# Patient Record
Sex: FEMALE | Race: BLACK OR AFRICAN AMERICAN | NOT HISPANIC OR LATINO | Employment: OTHER | URBAN - METROPOLITAN AREA
[De-identification: names, ages, dates, MRNs, and addresses within clinical notes are randomized per-mention and may not be internally consistent; named-entity substitution may affect disease eponyms.]

---

## 2017-01-30 ENCOUNTER — PRE VISIT (OUTPATIENT)
Dept: CARDIOLOGY | Facility: CLINIC | Age: 82
End: 2017-01-30

## 2017-02-08 ENCOUNTER — OFFICE VISIT (OUTPATIENT)
Dept: CARDIOLOGY | Facility: CLINIC | Age: 82
End: 2017-02-08
Attending: INTERNAL MEDICINE
Payer: MEDICARE

## 2017-02-08 VITALS
SYSTOLIC BLOOD PRESSURE: 162 MMHG | HEART RATE: 64 BPM | DIASTOLIC BLOOD PRESSURE: 86 MMHG | HEIGHT: 64 IN | OXYGEN SATURATION: 97 % | WEIGHT: 251 LBS | BODY MASS INDEX: 42.85 KG/M2

## 2017-02-08 DIAGNOSIS — I48.92 ATRIAL FLUTTER, UNSPECIFIED TYPE (H): Primary | ICD-10-CM

## 2017-02-08 DIAGNOSIS — I47.10 SVT (SUPRAVENTRICULAR TACHYCARDIA) (H): ICD-10-CM

## 2017-02-08 PROCEDURE — 99204 OFFICE O/P NEW MOD 45 MIN: CPT | Performed by: INTERNAL MEDICINE

## 2017-02-08 RX ORDER — CARVEDILOL 25 MG/1
12.5 TABLET ORAL 2 TIMES DAILY WITH MEALS
Qty: 60 TABLET | Refills: 3 | COMMUNITY
Start: 2017-02-08

## 2017-02-08 RX ORDER — AMIODARONE HYDROCHLORIDE 200 MG/1
TABLET ORAL
Qty: 30 TABLET | Refills: 1 | Status: ON HOLD | COMMUNITY
Start: 2017-02-08 | End: 2017-03-20

## 2017-02-08 NOTE — LETTER
2/8/2017    [facility]  [address] [facility] [address]    RE: Anastasiya Mota       Dear Colleague,      It was my pleasure seeing Ms. Anastasiya Mota for evaluation of SVT and atrial flutter.  She is a pleasant 87-year-old woman who had a prolonged hospitalization at the [facility] in 12/2015 due to a lower GI bleed which was complicated by a non-STEMI, acute renal insufficiency, ICU delirium, strokes and episodes of tachycardia.  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        With this cardiac background, the patient was admitted at [facility] on [place] Day 2016.  She lives in assisted living and told the staff she was not feeling well.  They detected tachycardia and she was sent to the emergency room.  I do have a 12-lead ECG that shows a regular tachycardia with RBBB morphology.  The patient has a baseline RBBB.  Apparently this terminated with adenosine in the emergency room but recurred soon thereafter, so she was placed on a diltiazem drip and was admitted.  While hospitalized at [facility], she was seen in consultation initially by Dr. Gupta and later by Dr. Mendez.  She was started on amiodarone 200 mg b.i.d. followed by 200 mg daily.  The patient was discharged on amiodarone as well as diltiazem 120 mg daily.  In addition, carvedilol 6.25 mg b.i.d. was added for hypertension.  Dr. Mendez requested that she follow up with EP as outpatient.        In coming in today, Ms. Mota was alert and in no apparent distress.  She was accompanied by her daughter.  She is a little hard of hearing.  Her daughter provided most of the history.  Apparently there have been spikes in her blood pressure at [facility].  She has not had documented recurrent tachycardia.  She has not had chest pain, syncope or near-syncope.      PHYSICAL EXAMINATION:   VITAL SIGNS:  Blood pressure  162/86, pulse 64 and regular, weight 113.8 kg.  Height 163 cm.  BMI 43.   GENERAL:  She is a pleasant, significantly overweight woman in no apparent distress.   NECK:  Supple without bruits.   LUNGS:  Clear except for the right base.   CARDIOVASCULAR:  Regular rhythm, no apparent gallop, murmur, or rub.   ABDOMEN:  Quite obese, soft, nontender.   EXTREMITIES:  Trace edema.   SKIN:  No rash.   BACK:  No CVA tenderness.   NEUROLOGIC:  Alert and oriented x3.      DIAGNOSTIC STUDIES:    Recent laboratory tests show creatinine 1.26, potassium 3.9.  TSH 2.05.  Hematocrit 41%.      Her baseline 12-lead ECG shows sinus rhythm with a right bundle-branch block.  Her tachycardia on ECG was described in the HPI.      She had an echocardiogram on 12/26/2016 showing normal LV function with EF of 60%-65%, moderate LVH, normal RV size and function.  There was mild TR with estimated RVSP of 38 mmHg plus right atrial pressure signifying pulmonary hypertension.     Outpatient Encounter Prescriptions as of 2/8/2017   Medication Sig Dispense Refill     carvedilol (COREG) 12.5 MG tablet Take 1 tablet (12.5 mg) by mouth 2 times daily (with meals) 60 tablet 3     amiodarone (PACERONE/CODARONE) 200 MG tablet 1 tab (200 mg) daily except on SAT + SUN (take 5 days/week) 30 tablet 1     diltiazem 120 MG 24 hr capsule Take 1 capsule (120 mg) by mouth daily 30 capsule 0     warfarin (COUMADIN) 1 MG tablet Take 7.5 mg the evening of 12/29/16 and 12/30/16, then reduce your dose back to 5 mg daily.  Continue lovenox injections until your INR is between 2.0 and 3.0.  Please have an INR checked in two days. 30 tablet 0     ferrous sulfate (IRON) 325 (65 FE) MG tablet Take 325 mg by mouth daily (with breakfast)        ANASTROZOLE PO Take 1 mg by mouth daily        ASPIRIN PO Take 81 mg by mouth daily        HYDROCHLOROTHIAZIDE PO Take 25 mg by mouth daily       LANSOPRAZOLE PO Take 30 mg by mouth every morning (before breakfast)        ONDANSETRON PO  Take 4 mg by mouth daily        polyethylene glycol (MIRALAX) powder Take 17 g by mouth daily       senna-docusate (SENEXON-S) 8.6-50 MG per tablet Take 2 tablets by mouth 2 times daily       atorvastatin (LIPITOR) 40 MG tablet Take 40 mg by mouth every evening        ACETAMINOPHEN PO Take 1,000 mg by mouth 3 times daily as needed        bisacodyl (DULCOLAX) 10 MG Suppository Place 10 mg rectally daily as needed for constipation       MELATONIN PO Take 3 mg by mouth nightly as needed        ondansetron (ZOFRAN ODT) 4 MG ODT tab Take 4 mg by mouth every 6 hours as needed for nausea       [DISCONTINUED] enoxaparin (LOVENOX) 100 MG/ML injection Inject 1 mL (100 mg) Subcutaneous every 12 hours (to be administered by her daughter who is a nurse) 12 Syringe 0     [DISCONTINUED] carvedilol (COREG) 6.25 MG tablet Take 1 tablet (6.25 mg) by mouth 2 times daily (with meals) 60 tablet 0     [DISCONTINUED] amiodarone (PACERONE/CODARONE) 200 MG tablet Take 1 tablet (200 mg) by mouth daily (start this dose on 1/2/17 after completing initial loading dose). 30 tablet 1     No facility-administered encounter medications on file as of 2/8/2017.       IMPRESSION:   1.  Tachycardia.  Ms. Mota had adenosine-responsive SVT during her recent admission at [facility], likely AVNRT.  We have not recently seen atrial flutter, but this was mentioned during her [place] hospitalization in 12/2015 when the patient was very ill with multiple issues.          From a cardiac standpoint, Ms. Mota is currently on diltiazem, carvedilol and amiodarone.  I discussed with the patient and her daughter that amiodarone is effective for treating many types of arrhythmia, but is a potentially toxic medication when taken long-term.           We discussed the following options:   a.  Continue amiodarone, but decrease the dose to minimize the risk of end-organ toxicity.   b.  Catheter ablation of supraventricular tachycardia.  I would not target the atrial  flutter unless easily inducible in the EP Lab.  This arrhythmia has not been seen recently and may have been triggered by acute illness back then.      The risks and benefits of EP study and catheter ablation of SVT were discussed in detail with Ms. Mota and her daughter.  Despite her 87 years of age, ablation appears this is the best long-term option which would enable us to stop amiodarone.    Ms. Mota's daughter, Cyndy, had several good questions and all were answered.  She indicated that they will give these options some thought and call back with a decision.      If they decide to proceed with EP study and ablation, I would stop amiodarone at least 2 weeks before the procedure and probably hold diltiazem and carvedilol for 48 hours before the procedure to maximize the likelihood of SVT inducibility and successful ablation.  She would need an alternative antihypertensive medication during that period.  If they decide to stay on amiodarone, she would need thyroid, liver function tests in about 3 months and baseline PFTs in the near future.     2.  Hypertension.  Her blood pressure was elevated today.  I asked her to increase the carvedilol.      RECOMMENDATIONS:   1.  Increase carvedilol to 12.5 mg b.i.d.   2.  Decrease amiodarone to 200 mg 5 days per week (skip on Saturdays and Sundays).   3.  Consider catheter ablation of SVT.   4.  A follow-up appointment has been requested in 6 months.              Thank you for the opportunity to participate in her care.     Sincerely,    Layla Peterson MD     [facility]

## 2017-02-08 NOTE — PROGRESS NOTES
HPI and Plan:   See dictation    Orders Placed This Encounter   Procedures     Follow-Up with Cardiac Advanced Practice Provider       Orders Placed This Encounter   Medications     carvedilol (COREG) 12.5 MG tablet     Sig: Take 1 tablet (12.5 mg) by mouth 2 times daily (with meals)     Dispense:  60 tablet     Refill:  3     amiodarone (PACERONE/CODARONE) 200 MG tablet     Si tab (200 mg) daily except on SAT + SUN (take 5 days/week)     Dispense:  30 tablet     Refill:  1       Medications Discontinued During This Encounter   Medication Reason     enoxaparin (LOVENOX) 100 MG/ML injection Therapy completed     carvedilol (COREG) 6.25 MG tablet Reorder     amiodarone (PACERONE/CODARONE) 200 MG tablet Reorder         Encounter Diagnoses   Name Primary?     SVT (supraventricular tachycardia) (H)      Atrial flutter, unspecified type (H) Yes       CURRENT MEDICATIONS:  Current Outpatient Prescriptions   Medication Sig Dispense Refill     carvedilol (COREG) 12.5 MG tablet Take 1 tablet (12.5 mg) by mouth 2 times daily (with meals) 60 tablet 3     amiodarone (PACERONE/CODARONE) 200 MG tablet 1 tab (200 mg) daily except on SAT + SUN (take 5 days/week) 30 tablet 1     diltiazem 120 MG 24 hr capsule Take 1 capsule (120 mg) by mouth daily 30 capsule 0     warfarin (COUMADIN) 1 MG tablet Take 7.5 mg the evening of 16 and 16, then reduce your dose back to 5 mg daily.  Continue lovenox injections until your INR is between 2.0 and 3.0.  Please have an INR checked in two days. 30 tablet 0     ferrous sulfate (IRON) 325 (65 FE) MG tablet Take 325 mg by mouth daily (with breakfast)        ANASTROZOLE PO Take 1 mg by mouth daily        ASPIRIN PO Take 81 mg by mouth daily        HYDROCHLOROTHIAZIDE PO Take 25 mg by mouth daily       LANSOPRAZOLE PO Take 30 mg by mouth every morning (before breakfast)        ONDANSETRON PO Take 4 mg by mouth daily        polyethylene glycol (MIRALAX) powder Take 17 g by mouth daily        senna-docusate (SENEXON-S) 8.6-50 MG per tablet Take 2 tablets by mouth 2 times daily       atorvastatin (LIPITOR) 40 MG tablet Take 40 mg by mouth every evening        ACETAMINOPHEN PO Take 1,000 mg by mouth 3 times daily as needed        bisacodyl (DULCOLAX) 10 MG Suppository Place 10 mg rectally daily as needed for constipation       MELATONIN PO Take 3 mg by mouth nightly as needed        ondansetron (ZOFRAN ODT) 4 MG ODT tab Take 4 mg by mouth every 6 hours as needed for nausea       [DISCONTINUED] carvedilol (COREG) 6.25 MG tablet Take 1 tablet (6.25 mg) by mouth 2 times daily (with meals) 60 tablet 0     [DISCONTINUED] amiodarone (PACERONE/CODARONE) 200 MG tablet Take 1 tablet (200 mg) by mouth daily (start this dose on 1/2/17 after completing initial loading dose). 30 tablet 1       ALLERGIES   No Known Allergies    PAST MEDICAL HISTORY:  Past Medical History   Diagnosis Date     Hypertension      SVT (supraventricular tachycardia) (H)      Cancer (H)      Breast cancer (H)      Acid reflux      Nausea      Cerebral infarction (H)      Elevated cholesterol      Constipation      Atrial fibrillation (H)      Paroxysmal supraventricular tachycardia (H)        PAST SURGICAL HISTORY:  Past Surgical History   Procedure Laterality Date     Breast surgery         FAMILY HISTORY:  Family History   Problem Relation Age of Onset     Unknown/Adopted No family hx of        SOCIAL HISTORY:  Social History     Social History     Marital Status:      Spouse Name: N/A     Number of Children: N/A     Years of Education: N/A     Social History Main Topics     Smoking status: Never Smoker      Smokeless tobacco: None     Alcohol Use: No     Drug Use: None     Sexual Activity: Not Asked     Other Topics Concern     Caffeine Concern No     Special Diet No     regular diet      Exercise No     Social History Narrative       [id number]

## 2017-02-09 NOTE — PROGRESS NOTES
HISTORY OF PRESENT ILLNESS:    It was my pleasure seeing Ms. Anastasiya Mota for evaluation of SVT and atrial flutter.  She is a pleasant 87-year-old woman who had a prolonged hospitalization at the [facility] in 12/2015 due to a lower GI bleed which was complicated by a non-STEMI, acute renal insufficiency, ICU delirium, strokes and episodes of tachycardia.  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        With this cardiac background, the patient was admitted at [facility] on [place] Day 2016.  She lives in assisted living and told the staff she was not feeling well.  They detected tachycardia and she was sent to the emergency room.  I do have a 12-lead ECG that shows a regular tachycardia with RBBB morphology.  The patient has a baseline RBBB.  Apparently this terminated with adenosine in the emergency room but recurred soon thereafter, so she was placed on a diltiazem drip and was admitted.  While hospitalized at [facility], she was seen in consultation initially by Dr. Gupta and later by Dr. Mendez.  She was started on amiodarone 200 mg b.i.d. followed by 200 mg daily.  The patient was discharged on amiodarone as well as diltiazem 120 mg daily.  In addition, carvedilol 6.25 mg b.i.d. was added for hypertension.  Dr. Mendez requested that she follow up with EP as outpatient.        In coming in today, Ms. Mota was alert and in no apparent distress.  She was accompanied by her daughter.  She is a little hard of hearing.  Her daughter provided most of the history.  Apparently there have been spikes in her blood pressure at [facility] living.  She has not had documented recurrent tachycardia.  She has not had chest pain, syncope or near-syncope.      PHYSICAL EXAMINATION:   VITAL SIGNS:  Blood pressure 162/86, pulse 64 and regular, weight 113.8 kg.  Height 163 cm.  BMI 43.   GENERAL:  She is a pleasant,  significantly overweight woman in no apparent distress.   NECK:  Supple without bruits.   LUNGS:  Clear except for the right base.   CARDIOVASCULAR:  Regular rhythm, no apparent gallop, murmur, or rub.   ABDOMEN:  Quite obese, soft, nontender.   EXTREMITIES:  Trace edema.   SKIN:  No rash.   BACK:  No CVA tenderness.   NEUROLOGIC:  Alert and oriented x3.      DIAGNOSTIC STUDIES:    Recent laboratory tests show creatinine 1.26, potassium 3.9.  TSH 2.05.  Hematocrit 41%.      Her baseline 12-lead ECG shows sinus rhythm with a right bundle-branch block.  Her tachycardia on ECG was described in the HPI.      She had an echocardiogram on 12/26/2016 showing normal LV function with EF of 60%-65%, moderate LVH, normal RV size and function.  There was mild TR with estimated RVSP of 38 mmHg plus right atrial pressure signifying pulmonary hypertension.      IMPRESSION:   1.  Tachycardia.  Ms. Mota had adenosine-responsive SVT during her recent admission at [facility], likely AVNRT.  We have not recently seen atrial flutter, but this was mentioned during her [place] hospitalization in 12/2015 when the patient was very ill with multiple issues.          From a cardiac standpoint, Ms. Mota is currently on diltiazem, carvedilol and amiodarone.  I discussed with the patient and her daughter that amiodarone is effective for treating many types of arrhythmia, but is a potentially toxic medication when taken long-term.           We discussed the following options:   a.  Continue amiodarone, but decrease the dose to minimize the risk of end-organ toxicity.   b.  Catheter ablation of supraventricular tachycardia.  I would not target the atrial flutter unless easily inducible in the EP Lab.  This arrhythmia has not been seen recently and may have been triggered by acute illness back then.      The risks and benefits of EP study and catheter ablation of SVT were discussed in detail with Ms. Mota and her daughter.  Despite her 87  years of age, ablation appears this is the best long-term option which would enable us to stop amiodarone.    Ms. Lockett's daughter, Cyndy, had several good questions and all were answered.  She indicated that they will give these options some thought and call back with a decision.      If they decide to proceed with EP study and ablation, I would stop amiodarone at least 2 weeks before the procedure and probably hold diltiazem and carvedilol for 48 hours before the procedure to maximize the likelihood of SVT inducibility and successful ablation.  She would need an alternative antihypertensive medication during that period.  If they decide to stay on amiodarone, she would need thyroid, liver function tests in about 3 months and baseline PFTs in the near future.     2.  Hypertension.  Her blood pressure was elevated today.  I asked her to increase the carvedilol.      RECOMMENDATIONS:   1.  Increase carvedilol to 12.5 mg b.i.d.   2.  Decrease amiodarone to 200 mg 5 days per week (skip on  and Sundays).   3.  Consider catheter ablation of SVT.   4.  A follow-up appointment has been requested in 6 months.      Thank you for the opportunity to participate in her care.         RICKY DUBOIS MD, [facility]             D: 2017 12:01   T: 2017 18:00   MT: KARLA      Name:     MG LOCKETT   MRN:      [id number]        Account:      [id number]   :      [id number]           Service Date: 2017      Document: [id number]

## 2017-02-12 ENCOUNTER — DOCUMENTATION ONLY (OUTPATIENT)
Dept: CARDIOLOGY | Facility: CLINIC | Age: 82
End: 2017-02-12

## 2017-02-12 DIAGNOSIS — I47.10 SVT (SUPRAVENTRICULAR TACHYCARDIA) (H): Primary | ICD-10-CM

## 2017-02-12 NOTE — PROGRESS NOTES
Please read my clinic note and call her daughter Cyndy regarding continuation of amiodarone vs catheter ablation of SVT.    If they want to continue amiodarone, she nneds baseline PFTs and TSH, LFTs in 3 months.  Thx, DI

## 2017-02-15 NOTE — PROGRESS NOTES
Attempted to call pt daughter and male voice stated that this writer had the wrong number. Attempted again and phone went to voice mail and message was left for Cyndy to call back. Sherlyn

## 2017-03-01 NOTE — PROGRESS NOTES
Sharron Ryan NP called today from [facility] in [place] called and needing help with pt BP (150-200 systolic) and wondering if Amiodarone can be stopped and Carvedilol increased. Sharron stated that staff at pt [facility] in [place] has been giving PRN clonidine each AM, which has been bringing pt BP down to the 130's systolic.  Discussed with Sharron, that this writer has been attempting to get in contact with pt daughter Cyndy, but have received no call back. Sharron stated that number that was called was Cyndy's . New phone number given, but at this time Sharron has a call out to Cyndy and have asked that Sharron have Cyndy call this writer to discuss plan going forward. Will then contact Sharron at [phone] to make aware. Cyndy's cell number has been added to demographics. [email or web address]

## 2017-03-03 NOTE — PROGRESS NOTES
Give amlodipine 10 mg daily for the days that dilt and carvedilol are on hold (to prevent significant BP spikes).   Thx, DI

## 2017-03-03 NOTE — PROGRESS NOTES
Pt daughter Cyndy called and states that she spoke to pt last evening and pt would like to have SVT ablation done. Reviewed Dr Peterson OV note on 2/8, it was documented: I would stop amiodarone at least 2 weeks before the procedure and probably hold diltiazem and carvedilol for 48 hours before the procedure to maximize the likelihood of SVT inducibility and successful ablation. Reviewed different dates with Cyndy, who stated that she had the day off on 3/20 and that day would work good. Have a hold on the morning of 3/20. Pt would need to stop Amiodarone this weekend and then hold Diltiazem and Carvedilol starting 3/18. Cyndy is aware of this and stated that this writer can contact Sharron Ryan NP at  Assisted Living and make her aware. Will discuss with Dr Peterson first d/t pt BP and need for med changes with Diltiazem and Carvedilol hold. JNelsonSHARAN

## 2017-03-06 ENCOUNTER — TRANSFERRED RECORDS (OUTPATIENT)
Dept: CARDIOLOGY | Facility: CLINIC | Age: 82
End: 2017-03-06

## 2017-03-06 LAB
ANION GAP SERPL CALCULATED.3IONS-SCNC: 7 MMOL/L
BUN SERPL-MCNC: 19 MG/DL
CALCIUM SERPL-MCNC: 8.8 MG/DL
CHLORIDE SERPLBLD-SCNC: 106 MMOL/L
CO2 SERPL-SCNC: 30 MMOL/L
CREAT SERPL-MCNC: 1.32 MG/DL
GFR SERPL CREATININE-BSD FRML MDRD: 38 ML/MIN/1.73M2
GLUCOSE SERPL-MCNC: 100 MG/DL (ref 70–99)
POTASSIUM SERPL-SCNC: 4 MMOL/L
SODIUM SERPL-SCNC: 143 MMOL/L

## 2017-03-08 DIAGNOSIS — I47.10 SVT (SUPRAVENTRICULAR TACHYCARDIA) (H): Primary | ICD-10-CM

## 2017-03-17 DIAGNOSIS — I47.10 SVT (SUPRAVENTRICULAR TACHYCARDIA) (H): Primary | ICD-10-CM

## 2017-03-17 RX ORDER — LIDOCAINE 40 MG/G
CREAM TOPICAL
Status: CANCELLED | OUTPATIENT
Start: 2017-03-17

## 2017-03-17 RX ORDER — SODIUM CHLORIDE 450 MG/100ML
INJECTION, SOLUTION INTRAVENOUS CONTINUOUS
Status: CANCELLED | OUTPATIENT
Start: 2017-03-17

## 2017-03-17 NOTE — PROGRESS NOTES
Spoke with Dr. Peterson. He would like pt to hold her warfarin on 3/18/2017 and 3/19/2017 for SVT ablation scheduled on 3/20/2017.     Called patient's daughter Cyndy at [phone], reminded her of orders to stop diltiazem and carvedilol, and take amlodipine 10mg daily instead. Cyndy wasn't sure if the assisted living knew about the order for amlodipine yet, so she asked writer to call them at [phone] to make sure. Also told Cyndy about order to hold warfarin on Saturday and Sunday. Cyndy states understanding.     Called Assisted Living at number above, spoke with Renae TSANG, gave her the above orders over the phone, and she said we should fax the orders to [phone] and she will take care of it with the pharmacy. Orders faxed.

## 2017-03-20 ENCOUNTER — HOSPITAL ENCOUNTER (OUTPATIENT)
Facility: CLINIC | Age: 82
Discharge: HOME | End: 2017-03-20
Attending: INTERNAL MEDICINE | Admitting: INTERNAL MEDICINE
Payer: MEDICARE

## 2017-03-20 ENCOUNTER — APPOINTMENT (OUTPATIENT)
Dept: CARDIOLOGY | Facility: CLINIC | Age: 82
End: 2017-03-20
Attending: INTERNAL MEDICINE
Payer: MEDICARE

## 2017-03-20 VITALS
TEMPERATURE: 97 F | BODY MASS INDEX: 43.02 KG/M2 | HEART RATE: 77 BPM | OXYGEN SATURATION: 95 % | RESPIRATION RATE: 18 BRPM | SYSTOLIC BLOOD PRESSURE: 145 MMHG | DIASTOLIC BLOOD PRESSURE: 90 MMHG | WEIGHT: 252 LBS | HEIGHT: 64 IN

## 2017-03-20 DIAGNOSIS — I47.10 SVT (SUPRAVENTRICULAR TACHYCARDIA) (H): ICD-10-CM

## 2017-03-20 LAB
ANION GAP SERPL CALCULATED.3IONS-SCNC: 3 MMOL/L (ref 3–14)
BUN SERPL-MCNC: 25 MG/DL (ref 7–30)
CALCIUM SERPL-MCNC: 8.6 MG/DL (ref 8.5–10.1)
CHLORIDE SERPL-SCNC: 107 MMOL/L (ref 94–109)
CO2 SERPL-SCNC: 33 MMOL/L (ref 20–32)
CREAT SERPL-MCNC: 1.37 MG/DL (ref 0.52–1.04)
ERYTHROCYTE [DISTWIDTH] IN BLOOD BY AUTOMATED COUNT: 15.6 % (ref 10–15)
GFR SERPL CREATININE-BSD FRML MDRD: 36 ML/MIN/1.7M2
GLUCOSE SERPL-MCNC: 96 MG/DL (ref 70–99)
HCT VFR BLD AUTO: 40.1 % (ref 35–47)
HGB BLD-MCNC: 12.5 G/DL (ref 11.7–15.7)
INR BLD: 1.6 (ref 0.86–1.14)
MCH RBC QN AUTO: 27.1 PG (ref 26.5–33)
MCHC RBC AUTO-ENTMCNC: 31.2 G/DL (ref 31.5–36.5)
MCV RBC AUTO: 87 FL (ref 78–100)
PLATELET # BLD AUTO: 273 10E9/L (ref 150–450)
POTASSIUM SERPL-SCNC: 4.2 MMOL/L (ref 3.4–5.3)
RBC # BLD AUTO: 4.62 10E12/L (ref 3.8–5.2)
SODIUM SERPL-SCNC: 143 MMOL/L (ref 133–144)
WBC # BLD AUTO: 6 10E9/L (ref 4–11)

## 2017-03-20 PROCEDURE — 93613 INTRACARDIAC EPHYS 3D MAPG: CPT | Performed by: INTERNAL MEDICINE

## 2017-03-20 PROCEDURE — 99152 MOD SED SAME PHYS/QHP 5/>YRS: CPT

## 2017-03-20 PROCEDURE — 93010 ELECTROCARDIOGRAM REPORT: CPT | Mod: 59 | Performed by: INTERNAL MEDICINE

## 2017-03-20 PROCEDURE — C1732 CATH, EP, DIAG/ABL, 3D/VECT: HCPCS

## 2017-03-20 PROCEDURE — 93613 INTRACARDIAC EPHYS 3D MAPG: CPT

## 2017-03-20 PROCEDURE — 99153 MOD SED SAME PHYS/QHP EA: CPT | Performed by: INTERNAL MEDICINE

## 2017-03-20 PROCEDURE — 27210795 ZZH PAD DEFIB QUICK CR4

## 2017-03-20 PROCEDURE — 02K83ZZ MAP CONDUCTION MECHANISM, PERCUTANEOUS APPROACH: ICD-10-PCS | Performed by: INTERNAL MEDICINE

## 2017-03-20 PROCEDURE — 93005 ELECTROCARDIOGRAM TRACING: CPT

## 2017-03-20 PROCEDURE — 85610 PROTHROMBIN TIME: CPT | Mod: QW | Performed by: INTERNAL MEDICINE

## 2017-03-20 PROCEDURE — 40000235 ZZH STATISTIC TELEMETRY

## 2017-03-20 PROCEDURE — C1730 CATH, EP, 19 OR FEW ELECT: HCPCS

## 2017-03-20 PROCEDURE — 4A023FZ MEASUREMENT OF CARDIAC RHYTHM, PERCUTANEOUS APPROACH: ICD-10-PCS | Performed by: INTERNAL MEDICINE

## 2017-03-20 PROCEDURE — 25000128 H RX IP 250 OP 636: Performed by: INTERNAL MEDICINE

## 2017-03-20 PROCEDURE — 4A0234Z MEASUREMENT OF CARDIAC ELECTRICAL ACTIVITY, PERCUTANEOUS APPROACH: ICD-10-PCS | Performed by: INTERNAL MEDICINE

## 2017-03-20 PROCEDURE — 93653 COMPRE EP EVAL TX SVT: CPT | Performed by: INTERNAL MEDICINE

## 2017-03-20 PROCEDURE — 36415 COLL VENOUS BLD VENIPUNCTURE: CPT | Performed by: INTERNAL MEDICINE

## 2017-03-20 PROCEDURE — 93653 COMPRE EP EVAL TX SVT: CPT

## 2017-03-20 PROCEDURE — 80048 BASIC METABOLIC PNL TOTAL CA: CPT | Performed by: INTERNAL MEDICINE

## 2017-03-20 PROCEDURE — 40000065 ZZH STATISTIC EKG NON-CHARGEABLE

## 2017-03-20 PROCEDURE — 99153 MOD SED SAME PHYS/QHP EA: CPT

## 2017-03-20 PROCEDURE — 27210782 ZZH KIT EP TOTES DISP CR7

## 2017-03-20 PROCEDURE — 27210796 ZZH PAD EXTRNAL REFRENCE CARDIAC MAPPING CR14

## 2017-03-20 PROCEDURE — 27210886 ZZH ACCESSORY CR5

## 2017-03-20 PROCEDURE — 93621 COMP EP EVL L PAC&REC C SINS: CPT

## 2017-03-20 PROCEDURE — 25000125 ZZHC RX 250: Performed by: INTERNAL MEDICINE

## 2017-03-20 PROCEDURE — 40000852 ZZH STATISTIC HEART CATH LAB OR EP LAB

## 2017-03-20 PROCEDURE — 02563ZZ DESTRUCTION OF RIGHT ATRIUM, PERCUTANEOUS APPROACH: ICD-10-PCS | Performed by: INTERNAL MEDICINE

## 2017-03-20 PROCEDURE — 85027 COMPLETE CBC AUTOMATED: CPT | Performed by: INTERNAL MEDICINE

## 2017-03-20 PROCEDURE — 99152 MOD SED SAME PHYS/QHP 5/>YRS: CPT | Performed by: INTERNAL MEDICINE

## 2017-03-20 PROCEDURE — 93623 PRGRMD STIMJ&PACG IV RX NFS: CPT | Mod: 26 | Performed by: INTERNAL MEDICINE

## 2017-03-20 PROCEDURE — 93621 COMP EP EVL L PAC&REC C SINS: CPT | Mod: 26 | Performed by: INTERNAL MEDICINE

## 2017-03-20 PROCEDURE — 27210995 ZZH RX 272: Performed by: INTERNAL MEDICINE

## 2017-03-20 RX ORDER — FUROSEMIDE 10 MG/ML
20-100 INJECTION INTRAMUSCULAR; INTRAVENOUS
Status: DISCONTINUED | OUTPATIENT
Start: 2017-03-20 | End: 2017-03-20 | Stop reason: HOSPADM

## 2017-03-20 RX ORDER — LIDOCAINE HYDROCHLORIDE 10 MG/ML
10-30 INJECTION, SOLUTION EPIDURAL; INFILTRATION; INTRACAUDAL; PERINEURAL
Status: DISCONTINUED | OUTPATIENT
Start: 2017-03-20 | End: 2017-03-20 | Stop reason: HOSPADM

## 2017-03-20 RX ORDER — LORAZEPAM 2 MG/ML
.5-2 INJECTION INTRAMUSCULAR EVERY 10 MIN PRN
Status: DISCONTINUED | OUTPATIENT
Start: 2017-03-20 | End: 2017-03-20 | Stop reason: HOSPADM

## 2017-03-20 RX ORDER — HYDRALAZINE HYDROCHLORIDE 20 MG/ML
20 INJECTION INTRAMUSCULAR; INTRAVENOUS ONCE
Status: COMPLETED | OUTPATIENT
Start: 2017-03-20 | End: 2017-03-20

## 2017-03-20 RX ORDER — NALOXONE HYDROCHLORIDE 0.4 MG/ML
0.4 INJECTION, SOLUTION INTRAMUSCULAR; INTRAVENOUS; SUBCUTANEOUS EVERY 5 MIN PRN
Status: DISCONTINUED | OUTPATIENT
Start: 2017-03-20 | End: 2017-03-20 | Stop reason: HOSPADM

## 2017-03-20 RX ORDER — PROMETHAZINE HYDROCHLORIDE 25 MG/ML
6.25-25 INJECTION, SOLUTION INTRAMUSCULAR; INTRAVENOUS EVERY 4 HOURS PRN
Status: DISCONTINUED | OUTPATIENT
Start: 2017-03-20 | End: 2017-03-20 | Stop reason: HOSPADM

## 2017-03-20 RX ORDER — KETOROLAC TROMETHAMINE 30 MG/ML
15 INJECTION, SOLUTION INTRAMUSCULAR; INTRAVENOUS
Status: DISCONTINUED | OUTPATIENT
Start: 2017-03-20 | End: 2017-03-20 | Stop reason: HOSPADM

## 2017-03-20 RX ORDER — DOBUTAMINE HYDROCHLORIDE 200 MG/100ML
5-40 INJECTION INTRAVENOUS CONTINUOUS PRN
Status: DISCONTINUED | OUTPATIENT
Start: 2017-03-20 | End: 2017-03-20 | Stop reason: HOSPADM

## 2017-03-20 RX ORDER — BUPIVACAINE HYDROCHLORIDE 2.5 MG/ML
10-30 INJECTION, SOLUTION EPIDURAL; INFILTRATION; INTRACAUDAL
Status: DISCONTINUED | OUTPATIENT
Start: 2017-03-20 | End: 2017-03-20 | Stop reason: HOSPADM

## 2017-03-20 RX ORDER — NALOXONE HYDROCHLORIDE 0.4 MG/ML
.1-.4 INJECTION, SOLUTION INTRAMUSCULAR; INTRAVENOUS; SUBCUTANEOUS
Status: DISCONTINUED | OUTPATIENT
Start: 2017-03-20 | End: 2017-03-20 | Stop reason: HOSPADM

## 2017-03-20 RX ORDER — ADENOSINE 3 MG/ML
6-12 INJECTION, SOLUTION INTRAVENOUS EVERY 5 MIN PRN
Status: DISCONTINUED | OUTPATIENT
Start: 2017-03-20 | End: 2017-03-20 | Stop reason: HOSPADM

## 2017-03-20 RX ORDER — PROTAMINE SULFATE 10 MG/ML
1-5 INJECTION, SOLUTION INTRAVENOUS
Status: DISCONTINUED | OUTPATIENT
Start: 2017-03-20 | End: 2017-03-20 | Stop reason: HOSPADM

## 2017-03-20 RX ORDER — MORPHINE SULFATE 2 MG/ML
1-2 INJECTION, SOLUTION INTRAMUSCULAR; INTRAVENOUS EVERY 5 MIN PRN
Status: DISCONTINUED | OUTPATIENT
Start: 2017-03-20 | End: 2017-03-20 | Stop reason: HOSPADM

## 2017-03-20 RX ORDER — LIDOCAINE 40 MG/G
CREAM TOPICAL
Status: DISCONTINUED | OUTPATIENT
Start: 2017-03-20 | End: 2017-03-20 | Stop reason: HOSPADM

## 2017-03-20 RX ORDER — FENTANYL CITRATE 50 UG/ML
25-50 INJECTION, SOLUTION INTRAMUSCULAR; INTRAVENOUS
Status: DISCONTINUED | OUTPATIENT
Start: 2017-03-20 | End: 2017-03-20 | Stop reason: HOSPADM

## 2017-03-20 RX ORDER — ONDANSETRON 2 MG/ML
4 INJECTION INTRAMUSCULAR; INTRAVENOUS EVERY 4 HOURS PRN
Status: DISCONTINUED | OUTPATIENT
Start: 2017-03-20 | End: 2017-03-20 | Stop reason: HOSPADM

## 2017-03-20 RX ORDER — IBUTILIDE FUMARATE 1 MG/10ML
0.01 INJECTION, SOLUTION INTRAVENOUS
Status: DISCONTINUED | OUTPATIENT
Start: 2017-03-20 | End: 2017-03-20 | Stop reason: HOSPADM

## 2017-03-20 RX ORDER — DIPHENHYDRAMINE HYDROCHLORIDE 50 MG/ML
25-50 INJECTION INTRAMUSCULAR; INTRAVENOUS
Status: DISCONTINUED | OUTPATIENT
Start: 2017-03-20 | End: 2017-03-20 | Stop reason: HOSPADM

## 2017-03-20 RX ORDER — SODIUM CHLORIDE 450 MG/100ML
INJECTION, SOLUTION INTRAVENOUS CONTINUOUS
Status: DISCONTINUED | OUTPATIENT
Start: 2017-03-20 | End: 2017-03-20 | Stop reason: HOSPADM

## 2017-03-20 RX ORDER — ACETAMINOPHEN 325 MG/1
650 TABLET ORAL EVERY 4 HOURS PRN
Status: DISCONTINUED | OUTPATIENT
Start: 2017-03-20 | End: 2017-03-20 | Stop reason: HOSPADM

## 2017-03-20 RX ORDER — ONDANSETRON 2 MG/ML
4 INJECTION INTRAMUSCULAR; INTRAVENOUS ONCE
Status: COMPLETED | OUTPATIENT
Start: 2017-03-20 | End: 2017-03-20

## 2017-03-20 RX ORDER — PROTAMINE SULFATE 10 MG/ML
5-40 INJECTION, SOLUTION INTRAVENOUS EVERY 10 MIN PRN
Status: DISCONTINUED | OUTPATIENT
Start: 2017-03-20 | End: 2017-03-20 | Stop reason: HOSPADM

## 2017-03-20 RX ORDER — FLUMAZENIL 0.1 MG/ML
0.2 INJECTION, SOLUTION INTRAVENOUS
Status: DISCONTINUED | OUTPATIENT
Start: 2017-03-20 | End: 2017-03-20 | Stop reason: HOSPADM

## 2017-03-20 RX ORDER — LIDOCAINE HYDROCHLORIDE AND EPINEPHRINE 10; 10 MG/ML; UG/ML
10-30 INJECTION, SOLUTION INFILTRATION; PERINEURAL
Status: DISCONTINUED | OUTPATIENT
Start: 2017-03-20 | End: 2017-03-20 | Stop reason: HOSPADM

## 2017-03-20 RX ORDER — IBUTILIDE FUMARATE 1 MG/10ML
1 INJECTION, SOLUTION INTRAVENOUS
Status: DISCONTINUED | OUTPATIENT
Start: 2017-03-20 | End: 2017-03-20 | Stop reason: HOSPADM

## 2017-03-20 RX ORDER — HEPARIN SODIUM 1000 [USP'U]/ML
1000-10000 INJECTION, SOLUTION INTRAVENOUS; SUBCUTANEOUS EVERY 5 MIN PRN
Status: DISCONTINUED | OUTPATIENT
Start: 2017-03-20 | End: 2017-03-20 | Stop reason: HOSPADM

## 2017-03-20 RX ADMIN — FENTANYL CITRATE 25 MCG: 50 INJECTION, SOLUTION INTRAMUSCULAR; INTRAVENOUS at 08:57

## 2017-03-20 RX ADMIN — FENTANYL CITRATE 25 MCG: 50 INJECTION, SOLUTION INTRAMUSCULAR; INTRAVENOUS at 09:20

## 2017-03-20 RX ADMIN — SODIUM CHLORIDE: 4.5 INJECTION, SOLUTION INTRAVENOUS at 07:32

## 2017-03-20 RX ADMIN — DOBUTAMINE IN DEXTROSE 5 MCG/KG/MIN: 200 INJECTION, SOLUTION INTRAVENOUS at 09:35

## 2017-03-20 RX ADMIN — MIDAZOLAM HYDROCHLORIDE 0.5 MG: 1 INJECTION, SOLUTION INTRAMUSCULAR; INTRAVENOUS at 09:23

## 2017-03-20 RX ADMIN — MIDAZOLAM HYDROCHLORIDE 0.5 MG: 1 INJECTION, SOLUTION INTRAMUSCULAR; INTRAVENOUS at 08:57

## 2017-03-20 RX ADMIN — ONDANSETRON 4 MG: 2 SOLUTION INTRAMUSCULAR; INTRAVENOUS at 11:09

## 2017-03-20 RX ADMIN — HYDRALAZINE HYDROCHLORIDE 20 MG: 20 INJECTION INTRAMUSCULAR; INTRAVENOUS at 08:59

## 2017-03-20 RX ADMIN — FENTANYL CITRATE 25 MCG: 50 INJECTION, SOLUTION INTRAMUSCULAR; INTRAVENOUS at 09:23

## 2017-03-20 RX ADMIN — MIDAZOLAM HYDROCHLORIDE 0.5 MG: 1 INJECTION, SOLUTION INTRAMUSCULAR; INTRAVENOUS at 09:20

## 2017-03-20 NOTE — PROCEDURES
Dictated.  SVT induction (AVNRT).  Successful slow pathway ablation.    No inducible atrial flutter.      No apparent complication.  EBL 20 cc.    Plan:  - stay off amiodarone  - resume other meds  - EP clinic f/up in 4-6 weeks

## 2017-03-20 NOTE — DISCHARGE INSTRUCTIONS
SVT Ablation Discharge Instructions - Femoral     [facility]: [phone] ( 8am-5pm M-F)  SHARAN Upton or Bella RN  [phone] [facility] (7 days a week)    After you go home:      Have an adult stay with you until tomorrow.    You may resume your normal diet.       For 24 hours - due to the sedation you received:    Relax and take it easy.    Do NOT make any important or legal decisions.    Do NOT drive or operate machines at home or at work.    Do NOT drink alcohol.    Care of Groin Puncture Site:      For the first 24 hrs - check the puncture site every 1-2 hours while awake.    For 2 days, when you cough, sneeze, laugh or move your bowels, hold your hand over the puncture site and press firmly.    Remove the bandaid after 24 hours. If there is minor oozing, apply another bandaid and remove it after 12 hours.    It is normal to have a small bruise or pea size lump at the site.    You may shower tomorrow.  Do NOT take a bath, or use a hot tub or pool for at least 3 days. Do NOT scrub the site. Do not use lotion or powder near the puncture site.    Activity:            For 2 days:    No stooping or squatting    Do NOT do any heavy activity such as exercise, lifting, or straining.     No housework, yard work or any activity that make you sweat    Do NOT lift more than 10 pounds    Bleeding:      If you start bleeding from the site in your groin, lie down flat and press firmly on the site for 10 minutes.     Once bleeding stops, lay flat for 2 hours.    Call [facility] as soon as you can.       Call 911 right away if you have heavy bleeding or bleeding that does not stop.      Medicines:      Take your medications, including blood thinners, unless your provider tells you not to.    If you have stopped any other medicines, check with your provider about when to restart them.    If you have pain or shortness of breath, you may take Advil (ibuprofen) or Tylenol (acetaminophen).    Follow Up  Appointments:      An appointment has been set up for you for follow-up care    You will receive a phone call tomorrow morning from Alexsandra TSANG or Bella TSANG.    Call the clinic if:      You have increased pain or a large or growing hard lump around the site.    The site is red, swollen, hot or tender.    Blood or fluid is draining from the site.    You have chills or a fever greater than 101 F (38 C).    Your leg feels numb, cool or changes color.    Increased pain in the chest and/or groin.    Increased shortness of breath    Chest pain not relieved by Tylenol or Advil    New pain in the back or belly that you cannot control with Tylenol.    Recurrent irregular or fast heart rate (AFlutter) lasting over 2 hours.    Any questions or concerns.    Heart rhythms:    You may have some irregular heartbeats. These feel very strong. They may make you feel that the fast heart rhythm is going to start again.  Give it time. The irregular beats should occur less often.       [facility]:    [phone] ( 8am-5pm M-F)  SHARAN Upton or SHARAN Blanco    [phone] [facility] (7 days a week)

## 2017-03-20 NOTE — IP AVS SNAPSHOT
MRN:[id number]                      After Visit Summary   3/20/2017    Anastasiya Mota    MRN: [id number]           Visit Information        Department      3/20/2017  6:32 AM [facility]          Review of your medicines      CONTINUE these medicines which have NOT CHANGED        Dose / Directions    ACETAMINOPHEN PO        Dose:  1000 mg   Take 1,000 mg by mouth 3 times daily as needed   Refills:  0       ANASTROZOLE PO        Dose:  1 mg   Take 1 mg by mouth daily   Refills:  0       atorvastatin 40 MG tablet   Commonly known as:  LIPITOR        Dose:  40 mg   Take 40 mg by mouth every evening   Refills:  0       bisacodyl 10 MG Suppository   Commonly known as:  DULCOLAX        Dose:  10 mg   Place 10 mg rectally daily as needed for constipation   Refills:  0       carvedilol 12.5 MG tablet   Commonly known as:  COREG   Used for:  Atrial flutter, unspecified type (H), SVT (supraventricular tachycardia) (H)        Dose:  12.5 mg   Take 1 tablet (12.5 mg) by mouth 2 times daily (with meals)   Quantity:  60 tablet   Refills:  3       diltiazem 120 MG 24 hr capsule   Used for:  Atrial flutter, unspecified type (H), SVT (supraventricular tachycardia) (H)        Dose:  120 mg   Take 1 capsule (120 mg) by mouth daily   Quantity:  30 capsule   Refills:  0       ferrous sulfate 325 (65 FE) MG tablet   Commonly known as:  IRON        Dose:  325 mg   Take 325 mg by mouth daily (with breakfast)   Refills:  0       HYDROCHLOROTHIAZIDE PO        Dose:  25 mg   Take 25 mg by mouth daily   Refills:  0       LANSOPRAZOLE PO        Dose:  30 mg   Take 30 mg by mouth every morning (before breakfast)   Refills:  0       MELATONIN PO        Dose:  3 mg   Take 3 mg by mouth nightly as needed   Refills:  0       MIRALAX powder   Generic drug:  polyethylene glycol        Dose:  17 g   Take 17 g by mouth daily   Refills:  0       * ONDANSETRON PO        Dose:  4 mg   Take 4 mg by mouth daily    Refills:  0       * ZOFRAN ODT 4 MG ODT tab   Generic drug:  ondansetron        Dose:  4 mg   Take 4 mg by mouth every 6 hours as needed for nausea   Refills:  0       SENEXON-S 8.6-50 MG per tablet   Generic drug:  senna-docusate        Dose:  2 tablet   Take 2 tablets by mouth 2 times daily   Refills:  0       warfarin 1 MG tablet   Commonly known as:  COUMADIN   Used for:  History of stroke, Atrial flutter, unspecified type (H)        Take 7.5 mg the evening of 12/29/16 and 12/30/16, then reduce your dose back to 5 mg daily.  Continue lovenox injections until your INR is between 2.0 and 3.0.  Please have an INR checked in two days.   Quantity:  30 tablet   Refills:  0       * Notice:  This list has 2 medication(s) that are the same as other medications prescribed for you. Read the directions carefully, and ask your doctor or other care provider to review them with you.      STOP taking     amiodarone 200 MG tablet   Commonly known as:  PACERONE/CODARONE           AMLODIPINE BESYLATE PO           ASPIRIN PO                    Protect others around you: Learn how to safely use, store and throw away your medicines at www.disposemymeds.org.         Follow-ups after your visit         Care Instructions        After Care Instructions     Discharge Instructions - Follow up with [facility] Heart Nurse Practitioner          Follow up with EP Nurse Practitioner at [facility] Clinic of patient preference in 5-6 weeks.            Discharge Instructions - Keep incision dry for 72 hours       Keep incision dry for 72 hours (unless Derma Salazar was applied)            Discharge Instructions - No driving for 1 day       No driving for 1 day and limit to necessary driving for 1 week.                  Further instructions from your care team       SVT Ablation Discharge Instructions - Femoral     [facility]: [phone] ( 8am-5pm M-F)  SHARAN Upton or SHARAN Blanco  [phone] [facility] (7 days a week)    After you go  home:      Have an adult stay with you until tomorrow.    You may resume your normal diet.       For 24 hours - due to the sedation you received:    Relax and take it easy.    Do NOT make any important or legal decisions.    Do NOT drive or operate machines at home or at work.    Do NOT drink alcohol.    Care of Groin Puncture Site:      For the first 24 hrs - check the puncture site every 1-2 hours while awake.    For 2 days, when you cough, sneeze, laugh or move your bowels, hold your hand over the puncture site and press firmly.    Remove the bandaid after 24 hours. If there is minor oozing, apply another bandaid and remove it after 12 hours.    It is normal to have a small bruise or pea size lump at the site.    You may shower tomorrow.  Do NOT take a bath, or use a hot tub or pool for at least 3 days. Do NOT scrub the site. Do not use lotion or powder near the puncture site.    Activity:            For 2 days:    No stooping or squatting    Do NOT do any heavy activity such as exercise, lifting, or straining.     No housework, yard work or any activity that make you sweat    Do NOT lift more than 10 pounds    Bleeding:      If you start bleeding from the site in your groin, lie down flat and press firmly on the site for 10 minutes.     Once bleeding stops, lay flat for 2 hours.    Call [facility] as soon as you can.       Call 911 right away if you have heavy bleeding or bleeding that does not stop.      Medicines:      Take your medications, including blood thinners, unless your provider tells you not to.    If you have stopped any other medicines, check with your provider about when to restart them.    If you have pain or shortness of breath, you may take Advil (ibuprofen) or Tylenol (acetaminophen).    Follow Up Appointments:      An appointment has been set up for you for follow-up care    You will receive a phone call tomorrow morning from Alexsandra TSANG or Bella TSANG.    Call the clinic if:      You have  "increased pain or a large or growing hard lump around the site.    The site is red, swollen, hot or tender.    Blood or fluid is draining from the site.    You have chills or a fever greater than 101 F (38 C).    Your leg feels numb, cool or changes color.    Increased pain in the chest and/or groin.    Increased shortness of breath    Chest pain not relieved by Tylenol or Advil    New pain in the back or belly that you cannot control with Tylenol.    Recurrent irregular or fast heart rate (AFlutter) lasting over 2 hours.    Any questions or concerns.    Heart rhythms:    You may have some irregular heartbeats. These feel very strong. They may make you feel that the fast heart rhythm is going to start again.  Give it time. The irregular beats should occur less often.       [facility] Department:    [phone] ( 8am-5pm M-F)  SHARAN Upton or SHARAN Blanco    [phone] [facility] (7 days a week)               Additional Information About Your Visit        [employer] Information     [employer] lets you send messages to your doctor, view your test results, renew your prescriptions, schedule appointments and more. To sign up, go to [email or web address] . Click on \"Log in\" on the left side of the screen, which will take you to the Welcome page. Then click on \"Sign up Now\" on the right side of the page.     You will be asked to enter the access code listed below, as well as some personal information. Please follow the directions to create your username and password.     Your access code is: [email or web address]  Expires: 3/26/2017  4:35 PM     Your access code will  in 90 days. If you need help or a new code, please call your [place] clinic or [phone].        Care EveryWhere ID     This is your Care EveryWhere ID. This could be used by other organizations to access your [place] medical records  [id number]        Your Vitals Were     Blood Pressure Pulse Temperature Respirations Height Weight    143/58 77 97  F " "(36.1  C) (Oral) 18 1.626 m (5' 4\") 114.3 kg (252 lb)    Pulse Oximetry BMI (Body Mass Index)                98% 43.26 kg/m2           Primary Care Provider Office Phone # Fax #    Efren [facility] [phone]      Thank you!     Thank you for choosing [facility] for your care. Our goal is always to provide you with excellent care. Hearing back from our patients is one way we can continue to improve our services. Please take a few minutes to complete the written survey that you may receive in the mail after you visit with us. Thank you!             Medication List: This is a list of all your medications and when to take them. Check marks below indicate your daily home schedule. Keep this list as a reference.      Medications           Morning Afternoon Evening Bedtime As Needed    ACETAMINOPHEN PO   Take 1,000 mg by mouth 3 times daily as needed                                ANASTROZOLE PO   Take 1 mg by mouth daily                                atorvastatin 40 MG tablet   Commonly known as:  LIPITOR   Take 40 mg by mouth every evening                                bisacodyl 10 MG Suppository   Commonly known as:  DULCOLAX   Place 10 mg rectally daily as needed for constipation                                carvedilol 12.5 MG tablet   Commonly known as:  COREG   Take 1 tablet (12.5 mg) by mouth 2 times daily (with meals)                                diltiazem 120 MG 24 hr capsule   Take 1 capsule (120 mg) by mouth daily                                ferrous sulfate 325 (65 FE) MG tablet   Commonly known as:  IRON   Take 325 mg by mouth daily (with breakfast)                                HYDROCHLOROTHIAZIDE PO   Take 25 mg by mouth daily                                LANSOPRAZOLE PO   Take 30 mg by mouth every morning (before breakfast)                                MELATONIN PO   Take 3 mg by mouth nightly as needed                                MIRALAX powder   Take 17 g by " mouth daily   Generic drug:  polyethylene glycol                                * ONDANSETRON PO   Take 4 mg by mouth daily                                * ZOFRAN ODT 4 MG ODT tab   Take 4 mg by mouth every 6 hours as needed for nausea   Generic drug:  ondansetron                                SENEXON-S 8.6-50 MG per tablet   Take 2 tablets by mouth 2 times daily   Generic drug:  senna-docusate                                warfarin 1 MG tablet   Commonly known as:  COUMADIN   Take 7.5 mg the evening of 12/29/16 and 12/30/16, then reduce your dose back to 5 mg daily.  Continue lovenox injections until your INR is between 2.0 and 3.0.  Please have an INR checked in two days.                                * Notice:  This list has 2 medication(s) that are the same as other medications prescribed for you. Read the directions carefully, and ask your doctor or other care provider to review them with you.

## 2017-03-20 NOTE — H&P
HISTORY OF PRESENT ILLNESS:    Ms. 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  She had a recent hospital admission with SVT despite treatment with a beta blocker and a calcium channel blocker.  She was sent home on amiodarone in addition to these drugs.  She has done well on amiodarone but there is concern about potential long-term toxicity with this agent.  A discussion took place during our clinic visit on 02/08/2017 whether to continue amiodarone or pursue catheter ablation.  After some consideration, the patient and her family have decided to pursue catheter ablation.      In preparation for the procedure today, the amiodarone was stopped 2 weeks ago.  In addition, the patient has held diltiazem and carvedilol for the past 48 hours.      PAST MEDICAL HISTORY:   1.  SVT.   2.  Reported atrial flutter during hospitalization in 2015.   3.  Hypertension.   4.  GERD.   5.  History of breast cancer.   6.  Dyslipidemia.      PHYSICAL EXAMINATION:   VITAL SIGNS:  Blood pressure 166/94, heart rate 77 and regular, respirations 18, room air saturation 98%.  She is overweight, pleasant woman in no apparent distress.   LUNGS:  Clear.   CARDIOVASCULAR:  Regular rhythm without murmur or rub.   EXTREMITIES:  No edema.      DIAGNOSTIC STUDIES:  INR 1.6, creatinine 1.37.  Hematocrit 40%, platelets 273,000.      IMPRESSION AND RECOMMENDATIONS:    1.  Adenosine-responsive SVT requiring hospitalization. The patient is being admitted for EP study and catheter ablation of SVT.  The technical aspects, risks and benefits of these procedures were previously discussed with the patient and her daughter.  We repeated part of the discussion today.  The patient is ready to go ahead with this and has signed the consent.  I note that the patient also has history of atrial flutter during a previous hospitalization at [facility] while critically ill; the plan is not to pursue atrial flutter ablation unless it is easily  inducible in the lab today.         RICKY DUBOIS MD, [facility]             D: 2017 08:49   T: 2017 09:09   MT: BENNIE#[id number]      Name:     MG LOCKETT   MRN:      [id number]        Account:      [id number]   :      1929           Admitted:     [id number]      Document: [id number]

## 2017-03-20 NOTE — PROGRESS NOTES
Returned to [facility] post SVT Ablation at 1000. Bedrest x 4 hours with HOB up to 30 degrees. Right groin site remains clean, dry, soft with bandaid in place. C/o slight nausea for first hour, then Zofran IV given, and pt denies any further nausea. Good appetite for lunch- ate box lunch, taking PO fluids well. Very pleasant, cooperative. Up to BR per ambulatory at 1400 and voided without difficulty. Discharge instructions reviewed with pt and daughter and given to pt. NSR HR 70's with BBB, no ectopy seen. Discharged to home per w/c with daughter driving.

## 2017-03-20 NOTE — IP AVS SNAPSHOT
[place] [address]    [place] [address]    Phone:  [phone]                                       After Visit Summary   3/20/2017    Anastasiya Mota    MRN: [id number]           After Visit Summary Signature Page     I have received my discharge instructions, and my questions have been answered. I have discussed any challenges I see with this plan with the nurse or doctor.    ..........................................................................................................................................  Patient/Patient Representative Signature      ..........................................................................................................................................  Patient Representative Print Name and Relationship to Patient    ..................................................               ................................................  Date                                            Time    ..........................................................................................................................................  Reviewed by Signature/Title    ...................................................              ..............................................  Date                                                            Time

## 2017-03-22 LAB — INTERPRETATION ECG - MUSE: NORMAL

## 2017-04-04 ENCOUNTER — TELEPHONE (OUTPATIENT)
Dept: CARDIOLOGY | Facility: CLINIC | Age: 82
End: 2017-04-04

## 2017-04-04 NOTE — TELEPHONE ENCOUNTER
Sharron Ryan NP at Pt  [facility] requesting pt updated medication list and what medications have been stopped and continued. Have faxed med list, procedure note, that stops meds stopped, and pt follow up schedule. Sherlyn

## 2017-04-06 ENCOUNTER — HOSPITAL ENCOUNTER (OUTPATIENT)
Facility: CLINIC | Age: 82
Setting detail: OBSERVATION
Discharge: HOME | End: 2017-04-07
Attending: EMERGENCY MEDICINE | Admitting: HOSPITALIST
Payer: MEDICARE

## 2017-04-06 ENCOUNTER — APPOINTMENT (OUTPATIENT)
Dept: CT IMAGING | Facility: CLINIC | Age: 82
End: 2017-04-06
Attending: EMERGENCY MEDICINE
Payer: MEDICARE

## 2017-04-06 DIAGNOSIS — I63.9 CVA (CEREBRAL VASCULAR ACCIDENT) (H): ICD-10-CM

## 2017-04-06 DIAGNOSIS — I48.91 ATRIAL FIBRILLATION (H): Primary | ICD-10-CM

## 2017-04-06 DIAGNOSIS — K92.1 BLOOD IN STOOL: ICD-10-CM

## 2017-04-06 PROBLEM — K62.5 BRIGHT RED BLOOD PER RECTUM: Status: ACTIVE | Noted: 2017-04-06

## 2017-04-06 LAB
ABO + RH BLD: NORMAL
ABO + RH BLD: NORMAL
ANION GAP SERPL CALCULATED.3IONS-SCNC: 6 MMOL/L (ref 3–14)
BASOPHILS # BLD AUTO: 0.1 10E9/L (ref 0–0.2)
BASOPHILS NFR BLD AUTO: 0.8 %
BLD GP AB SCN SERPL QL: NORMAL
BLOOD BANK CMNT PATIENT-IMP: NORMAL
BUN SERPL-MCNC: 27 MG/DL (ref 7–30)
CALCIUM SERPL-MCNC: 8.3 MG/DL (ref 8.5–10.1)
CHLORIDE SERPL-SCNC: 104 MMOL/L (ref 94–109)
CO2 SERPL-SCNC: 31 MMOL/L (ref 20–32)
CREAT SERPL-MCNC: 1.31 MG/DL (ref 0.52–1.04)
DIFFERENTIAL METHOD BLD: ABNORMAL
EOSINOPHIL # BLD AUTO: 0.2 10E9/L (ref 0–0.7)
EOSINOPHIL NFR BLD AUTO: 3.8 %
ERYTHROCYTE [DISTWIDTH] IN BLOOD BY AUTOMATED COUNT: 15.2 % (ref 10–15)
GFR SERPL CREATININE-BSD FRML MDRD: 38 ML/MIN/1.7M2
GLUCOSE SERPL-MCNC: 96 MG/DL (ref 70–99)
HCT VFR BLD AUTO: 41.3 % (ref 35–47)
HGB BLD-MCNC: 12 G/DL (ref 11.7–15.7)
HGB BLD-MCNC: 12.4 G/DL (ref 11.7–15.7)
IMM GRANULOCYTES # BLD: 0 10E9/L (ref 0–0.4)
IMM GRANULOCYTES NFR BLD: 0.5 %
INR PPP: 1.84 (ref 0.86–1.14)
LACTATE BLD-SCNC: 1 MMOL/L (ref 0.7–2.1)
LYMPHOCYTES # BLD AUTO: 1.3 10E9/L (ref 0.8–5.3)
LYMPHOCYTES NFR BLD AUTO: 21.8 %
MCH RBC QN AUTO: 26.5 PG (ref 26.5–33)
MCHC RBC AUTO-ENTMCNC: 30 G/DL (ref 31.5–36.5)
MCV RBC AUTO: 88 FL (ref 78–100)
MONOCYTES # BLD AUTO: 0.7 10E9/L (ref 0–1.3)
MONOCYTES NFR BLD AUTO: 11 %
NEUTROPHILS # BLD AUTO: 3.8 10E9/L (ref 1.6–8.3)
NEUTROPHILS NFR BLD AUTO: 62.1 %
NRBC # BLD AUTO: 0 10*3/UL
NRBC BLD AUTO-RTO: 0 /100
PLATELET # BLD AUTO: 270 10E9/L (ref 150–450)
POTASSIUM SERPL-SCNC: 4.6 MMOL/L (ref 3.4–5.3)
RBC # BLD AUTO: 4.68 10E12/L (ref 3.8–5.2)
SODIUM SERPL-SCNC: 141 MMOL/L (ref 133–144)
SPECIMEN EXP DATE BLD: NORMAL
WBC # BLD AUTO: 6.1 10E9/L (ref 4–11)

## 2017-04-06 PROCEDURE — 99220 ZZC INITIAL OBSERVATION CARE,LEVL III: CPT | Performed by: HOSPITALIST

## 2017-04-06 PROCEDURE — 85025 COMPLETE CBC W/AUTO DIFF WBC: CPT | Performed by: EMERGENCY MEDICINE

## 2017-04-06 PROCEDURE — 96361 HYDRATE IV INFUSION ADD-ON: CPT

## 2017-04-06 PROCEDURE — A9270 NON-COVERED ITEM OR SERVICE: HCPCS | Mod: GY | Performed by: HOSPITALIST

## 2017-04-06 PROCEDURE — 85018 HEMOGLOBIN: CPT | Performed by: HOSPITALIST

## 2017-04-06 PROCEDURE — 96360 HYDRATION IV INFUSION INIT: CPT

## 2017-04-06 PROCEDURE — 74176 CT ABD & PELVIS W/O CONTRAST: CPT

## 2017-04-06 PROCEDURE — 25000128 H RX IP 250 OP 636: Performed by: EMERGENCY MEDICINE

## 2017-04-06 PROCEDURE — 86850 RBC ANTIBODY SCREEN: CPT | Performed by: EMERGENCY MEDICINE

## 2017-04-06 PROCEDURE — A9270 NON-COVERED ITEM OR SERVICE: HCPCS | Mod: GY | Performed by: PHYSICIAN ASSISTANT

## 2017-04-06 PROCEDURE — G0378 HOSPITAL OBSERVATION PER HR: HCPCS

## 2017-04-06 PROCEDURE — A9270 NON-COVERED ITEM OR SERVICE: HCPCS | Performed by: HOSPITALIST

## 2017-04-06 PROCEDURE — 25000132 ZZH RX MED GY IP 250 OP 250 PS 637: Mod: GY | Performed by: PHYSICIAN ASSISTANT

## 2017-04-06 PROCEDURE — 86901 BLOOD TYPING SEROLOGIC RH(D): CPT | Performed by: EMERGENCY MEDICINE

## 2017-04-06 PROCEDURE — 86900 BLOOD TYPING SEROLOGIC ABO: CPT | Performed by: EMERGENCY MEDICINE

## 2017-04-06 PROCEDURE — 25000132 ZZH RX MED GY IP 250 OP 250 PS 637: Mod: GY | Performed by: HOSPITALIST

## 2017-04-06 PROCEDURE — 99285 EMERGENCY DEPT VISIT HI MDM: CPT | Mod: 25

## 2017-04-06 PROCEDURE — 83605 ASSAY OF LACTIC ACID: CPT | Performed by: EMERGENCY MEDICINE

## 2017-04-06 PROCEDURE — 25000128 H RX IP 250 OP 636: Performed by: PHYSICIAN ASSISTANT

## 2017-04-06 PROCEDURE — 25000125 ZZHC RX 250: Performed by: HOSPITALIST

## 2017-04-06 PROCEDURE — 36415 COLL VENOUS BLD VENIPUNCTURE: CPT | Performed by: HOSPITALIST

## 2017-04-06 PROCEDURE — 80048 BASIC METABOLIC PNL TOTAL CA: CPT | Performed by: EMERGENCY MEDICINE

## 2017-04-06 PROCEDURE — 85610 PROTHROMBIN TIME: CPT | Performed by: EMERGENCY MEDICINE

## 2017-04-06 RX ORDER — SODIUM CHLORIDE 9 MG/ML
INJECTION, SOLUTION INTRAVENOUS CONTINUOUS
Status: ACTIVE | OUTPATIENT
Start: 2017-04-06 | End: 2017-04-07

## 2017-04-06 RX ORDER — HYDROCHLOROTHIAZIDE 25 MG/1
25 TABLET ORAL DAILY
Status: DISCONTINUED | OUTPATIENT
Start: 2017-04-07 | End: 2017-04-07 | Stop reason: HOSPADM

## 2017-04-06 RX ORDER — NALOXONE HYDROCHLORIDE 0.4 MG/ML
.1-.4 INJECTION, SOLUTION INTRAMUSCULAR; INTRAVENOUS; SUBCUTANEOUS
Status: DISCONTINUED | OUTPATIENT
Start: 2017-04-06 | End: 2017-04-07 | Stop reason: HOSPADM

## 2017-04-06 RX ORDER — SODIUM CHLORIDE 9 MG/ML
1000 INJECTION, SOLUTION INTRAVENOUS CONTINUOUS
Status: DISCONTINUED | OUTPATIENT
Start: 2017-04-06 | End: 2017-04-06

## 2017-04-06 RX ORDER — ONDANSETRON 4 MG/1
4 TABLET, ORALLY DISINTEGRATING ORAL EVERY 8 HOURS PRN
Status: DISCONTINUED | OUTPATIENT
Start: 2017-04-06 | End: 2017-04-07 | Stop reason: HOSPADM

## 2017-04-06 RX ORDER — PANTOPRAZOLE SODIUM 40 MG/1
40 TABLET, DELAYED RELEASE ORAL
Status: DISCONTINUED | OUTPATIENT
Start: 2017-04-06 | End: 2017-04-07 | Stop reason: HOSPADM

## 2017-04-06 RX ORDER — ANASTROZOLE 1 MG/1
1 TABLET ORAL DAILY
Status: DISCONTINUED | OUTPATIENT
Start: 2017-04-06 | End: 2017-04-07 | Stop reason: HOSPADM

## 2017-04-06 RX ORDER — LISINOPRIL 20 MG/1
20 TABLET ORAL ONCE
Status: COMPLETED | OUTPATIENT
Start: 2017-04-06 | End: 2017-04-06

## 2017-04-06 RX ORDER — ATORVASTATIN CALCIUM 40 MG/1
40 TABLET, FILM COATED ORAL EVERY EVENING
Status: DISCONTINUED | OUTPATIENT
Start: 2017-04-06 | End: 2017-04-07 | Stop reason: HOSPADM

## 2017-04-06 RX ORDER — LISINOPRIL 20 MG/1
20 TABLET ORAL DAILY
Status: DISCONTINUED | OUTPATIENT
Start: 2017-04-07 | End: 2017-04-07 | Stop reason: HOSPADM

## 2017-04-06 RX ORDER — DILTIAZEM HYDROCHLORIDE 120 MG/1
120 CAPSULE, COATED, EXTENDED RELEASE ORAL DAILY
Status: DISCONTINUED | OUTPATIENT
Start: 2017-04-07 | End: 2017-04-07 | Stop reason: HOSPADM

## 2017-04-06 RX ORDER — CARVEDILOL 12.5 MG/1
12.5 TABLET ORAL 2 TIMES DAILY WITH MEALS
Status: DISCONTINUED | OUTPATIENT
Start: 2017-04-06 | End: 2017-04-07 | Stop reason: HOSPADM

## 2017-04-06 RX ORDER — ACETAMINOPHEN 500 MG
1000 TABLET ORAL 3 TIMES DAILY PRN
Status: DISCONTINUED | OUTPATIENT
Start: 2017-04-06 | End: 2017-04-07 | Stop reason: HOSPADM

## 2017-04-06 RX ADMIN — SERTRALINE HYDROCHLORIDE 50 MG: 50 TABLET ORAL at 17:28

## 2017-04-06 RX ADMIN — ATORVASTATIN CALCIUM 40 MG: 40 TABLET, FILM COATED ORAL at 19:45

## 2017-04-06 RX ADMIN — ANASTROZOLE 1 MG: 1 TABLET, COATED ORAL at 17:28

## 2017-04-06 RX ADMIN — SODIUM CHLORIDE 1000 ML: 9 INJECTION, SOLUTION INTRAVENOUS at 10:51

## 2017-04-06 RX ADMIN — CARVEDILOL 12.5 MG: 12.5 TABLET, FILM COATED ORAL at 17:28

## 2017-04-06 RX ADMIN — SODIUM CHLORIDE: 9 INJECTION, SOLUTION INTRAVENOUS at 20:21

## 2017-04-06 RX ADMIN — ONDANSETRON 4 MG: 4 TABLET, ORALLY DISINTEGRATING ORAL at 15:51

## 2017-04-06 RX ADMIN — SODIUM CHLORIDE 1000 ML: 9 INJECTION, SOLUTION INTRAVENOUS at 11:42

## 2017-04-06 RX ADMIN — PANTOPRAZOLE SODIUM 40 MG: 40 TABLET, DELAYED RELEASE ORAL at 15:51

## 2017-04-06 RX ADMIN — LISINOPRIL 20 MG: 20 TABLET ORAL at 22:09

## 2017-04-06 ASSESSMENT — ENCOUNTER SYMPTOMS
VOMITING: 0
LIGHT-HEADEDNESS: 1
DIARRHEA: 0
DIZZINESS: 1
BLOOD IN STOOL: 1
FEVER: 0
ABDOMINAL PAIN: 0
NAUSEA: 0

## 2017-04-06 NOTE — IP AVS SNAPSHOT
"    [facility] OBSERVATION DEPARTMENT: [phone]                                              INTERAGENCY TRANSFER FORM - PHYSICIAN ORDERS   2017                    Hospital Admission Date: 2017  MG LOCKETT   : 1929  Sex: Female        Attending Provider: Steven Melo MD     Allergies:  No Known Allergies    Infection:  None   Service:  Observation    Ht:  1.6 m (5' 3\")   Wt:  114.9 kg (253 lb 3.2 oz)   Admission Wt:  114.9 kg (253 lb 3.2 oz)    BMI:  44.85 kg/m 2   BSA:  2.26 m 2            Patient PCP Information     Provider PCP Type    [facility]      ED Clinical Impression     Diagnosis Description Comment Added By Time Added    Blood in stool [K92.1] Blood in stool [K92.1]  Sondra Santizo MD 2017 12:32 PM      Hospital Problems as of 2017              Priority Class Noted POA    Bright red blood per rectum Medium  2017 Yes      Non-Hospital Problems as of 2017              Priority Class Noted    SVT (supraventricular tachycardia) (H) Medium  2016    Atrial fibrillation (H) Medium  Unknown      Code Status History     Date Active Date Inactive Code Status Order ID Comments User Context    2017 11:42 AM  DNR/DNI [id number]  Katharina Page PA-C Outpatient    2017  2:45 PM 2017 11:42 AM DNR/DNI [id number]  Steven Melo MD Inpatient    2016 10:04 AM 2017  2:45 PM Full Code [id number]  Amilcar Yip MD Outpatient    2016  7:33 AM 2016 10:04 AM Full Code [id number]  Cande Monterroso MD Inpatient         Medication Review      START taking        Dose / Directions Comments    aspirin 81 MG EC tablet   Used for:  CVA (cerebral vascular accident) (H)        Dose:  81 mg   Take 1 tablet (81 mg) by mouth daily   Quantity:  90 tablet   Refills:  3          CONTINUE these medications which have NOT CHANGED        Dose / Directions Comments    ACETAMINOPHEN PO        " Dose:  1000 mg   Take 1,000 mg by mouth 3 times daily as needed   Refills:  0        ANASTROZOLE PO        Dose:  1 mg   Take 1 mg by mouth daily   Refills:  0        atorvastatin 40 MG tablet   Commonly known as:  LIPITOR        Dose:  40 mg   Take 40 mg by mouth every evening   Refills:  0        carvedilol 12.5 MG tablet   Commonly known as:  COREG   Used for:  Atrial flutter, unspecified type (H), SVT (supraventricular tachycardia) (H)        Dose:  12.5 mg   Take 1 tablet (12.5 mg) by mouth 2 times daily (with meals)   Quantity:  60 tablet   Refills:  3        diltiazem 120 MG 24 hr capsule   Used for:  Atrial flutter, unspecified type (H), SVT (supraventricular tachycardia) (H)        Dose:  120 mg   Take 1 capsule (120 mg) by mouth daily   Quantity:  30 capsule   Refills:  0        ferrous sulfate 325 (65 FE) MG tablet   Commonly known as:  IRON        Dose:  325 mg   Take 325 mg by mouth daily (with breakfast)   Refills:  0        HYDROCHLOROTHIAZIDE PO        Dose:  25 mg   Take 25 mg by mouth daily   Refills:  0        LANSOPRAZOLE PO        Dose:  30 mg   Take 30 mg by mouth every morning (before breakfast)   Refills:  0        LISINOPRIL PO        Dose:  20 mg   Take 20 mg by mouth daily   Refills:  0        MIRALAX powder   Generic drug:  polyethylene glycol        Dose:  17 g   Take 17 g by mouth 2 times daily   Refills:  0        SENEXON-S 8.6-50 MG per tablet   Generic drug:  senna-docusate        Dose:  2 tablet   Take 2 tablets by mouth 2 times daily   Refills:  0        ZOFRAN PO        Dose:  4 mg   Take 4 mg by mouth every 8 hours as needed for nausea or vomiting   Refills:  0        ZOLOFT PO        Dose:  50 mg   Take 50 mg by mouth daily   Refills:  0          STOP taking     WARFARIN SODIUM PO                   Summary of Visit     Reason for your hospital stay       Bright red blood per rectum likely due to diverticular bleeding with concomitant coumadin use. Pt was discussed with   Kristen, agreed with stopping coumadin. No further bleeding noted. Hemoglobin remained stable.             After Care     Activity       Your activity upon discharge: activity as tolerated       Diet       Follow this diet upon discharge: Regular             Your next 10 appointments already scheduled     Apr 26, 2017  1:10 PM CDT   Return Discharge with VEENA King CNP   [facility] AT [place] ([facility])    [address]  [facility] [address] [phone]              Follow-Up Appointment Instructions     Future Labs/Procedures    Follow-up and recommended labs and tests      Comments:    Follow up with Dr. Peterson as previously scheduled.  Follow up with PCP in 1-2 weeks for hospital follow up regarding GI bleed.  Wear cardiac event monitor for 3 weeks  Start baby aspirin on Sunday morning.      Follow-Up Appointment Instructions     Follow-up and recommended labs and tests        Follow up with Dr. Peterson as previously scheduled.  Follow up with PCP in 1-2 weeks for hospital follow up regarding GI bleed.  Wear cardiac event monitor for 3 weeks  Start baby aspirin on Sunday morning.             Statement of Approval     Ordered          04/07/17 1142  I have reviewed and agree with all the recommendations and orders detailed in this document.  EFFECTIVE NOW     Approved and electronically signed by:  Katharina Page PA-C

## 2017-04-06 NOTE — PLAN OF CARE
Problem: Discharge Planning  Goal: Discharge Planning (Adult, OB, Behavioral, Peds)  PRIMARY DIAGNOSIS: GI BLEED  OUTPATIENT/OBSERVATION GOALS TO BE MET BEFORE DISCHARGE:  1.     Vital Signs stable: Yes  2.     Pain status: Pain free.  3.     Number of Bleeding Episodes during this shift: 1  4.     ADLs back to baseline? No  5.     Cleared by consultants? None ordered  6.     Barriers to discharge noted: No  7.     Interpretation of rhythm per telemetry tech: SR BBB in 60s  A&O x 4, but has expressive aphasia from prior CVA.  Had 1 small dark red stool upon admission.  PA notified.  Will order evening hgb if GIB continues.  Most recent hgb 12.4. Complained of mild nausea.  PRN zofran given.  Was able to tolerate clear liquids after. IVF running.     Please review provider order for any additional goals.      Nurse to notify provider when observation goals have been met and patient is ready for discharge.

## 2017-04-06 NOTE — IP AVS SNAPSHOT
"` `     [facility] OBSERVATION DEPARTMENT: [phone]                                              INTERAGENCY TRANSFER FORM - NURSING   2017                    Hospital Admission Date: 2017  MG LOCKETT   : 1929  Sex: Female        Attending Provider: Steven Melo MD     Allergies:  No Known Allergies    Infection:  None   Service:  Observation    Ht:  1.6 m (5' 3\")   Wt:  114.9 kg (253 lb 3.2 oz)   Admission Wt:  114.9 kg (253 lb 3.2 oz)    BMI:  44.85 kg/m 2   BSA:  2.26 m 2            Patient PCP Information     Provider PCP Type    [facility]      Current Code Status     Date Active Code Status Order ID Comments User Context       Prior      Code Status History     Date Active Date Inactive Code Status Order ID Comments User Context    2017 11:42 AM  DNR/DNI [id number]  Katharina Page PA-C Outpatient    2017  2:45 PM 2017 11:42 AM DNR/DNI [id number]  Steven Melo MD Inpatient    2016 10:04 AM 2017  2:45 PM Full Code [id number]  Amilcar Yip MD Outpatient    2016  7:33 AM 2016 10:04 AM Full Code [id number]  Cande Monterroso MD Inpatient      Advance Directives        Does patient have a scanned Advance Directive/ACP document in EPIC?           No        Hospital Problems as of 2017              Priority Class Noted POA    Bright red blood per rectum Medium  2017 Yes      Non-Hospital Problems as of 2017              Priority Class Noted    SVT (supraventricular tachycardia) (H) Medium  2016    Atrial fibrillation (H) Medium  Unknown      Immunizations     None         END      ASSESSMENT     Discharge Profile Flowsheet     EXPECTED DISCHARGE     Patient's communication style  spoken language (English or Bilingual) 17 1502    Expected Discharge Date  17 (DRG OBS 1.0< home) 17 0920   SKIN      GASTROINTESTINAL (ADULT,PEDIATRIC,OB)     Inspection  Full " "04/07/17 0401    GI WDL  WDL;GI symptoms 04/07/17 0855   Skin WDL  WDL 04/07/17 0401    Last Bowel Movement  04/06/17 04/07/17 0855   SAFETY      GI Signs/Symptoms  nausea 04/07/17 0855   Safety WDL  WDL 04/07/17 0401    COMMUNICATION ASSESSMENT                        Assessment WDL (Within Defined Limits) Definitions           Safety WDL     Effective: 09/28/15    Row Information: <b>WDL Definition:</b> Bed in low position, wheels locked; call light in reach; upper side rails up x 2; ID band on<br> <font color=\"gray\"><i>Item=AS safety wdl>>List=AS safety wdl>>Version=F14</i></font>      Skin WDL     Effective: 09/28/15    Row Information: <b>WDL Definition:</b> Warm; dry; intact; elastic; without discoloration; pressure points without redness<br> <font color=\"gray\"><i>Item=AS skin wdl>>List=AS skin wdl>>Version=F14</i></font>      Vitals     Vital Signs Flowsheet     VITAL SIGNS     Weight  114.9 kg (253 lb 3.2 oz) 04/06/17 1456    Temp  98.4  F (36.9  C) 04/07/17 1244   BSA (Calculated - sq m)  2.26 04/06/17 1456    Temp src  Oral 04/07/17 1244   BMI (Calculated)  44.95 04/06/17 1456    Resp  16 04/07/17 1244   DAILY CARE      Pulse  65 04/07/17 0849   Activity Type  ambulated to bathroom 04/07/17 0226    Heart Rate  62 04/07/17 1244   Activity Level of Assistance  assistance, 1 person 04/07/17 0226    Pulse/Heart Rate Source  Monitor 04/07/17 1244   Activity Assistive Device  walker 04/07/17 0226    BP  157/61 04/07/17 1244   Additional Documentation  Activity Device Assistance (Row) 04/06/17 1455    BP Location  Right arm 04/07/17 1244   POSITIONING      OXYGEN THERAPY     Body Position  supine 04/07/17 0823    SpO2  92 % 04/07/17 1244   Head of Bed (HOB)  HOB flat 04/07/17 0823    O2 Device  None (Room air) 04/07/17 1244   ANALGESIA SIDE EFFECTS MONITORING      PAIN/COMFORT     Side Effects Monitoring: Respiratory Quality  R 04/07/17 0336    Patient Currently in Pain  denies 04/07/17 0336   Side Effects " "Monitoring: Respiratory Depth  N 04/07/17 0336    HEIGHT AND WEIGHT     Side Effects Monitoring: Sedation Level  S 04/07/17 0336    Height  1.6 m (5' 3\") 04/06/17 1456                 Patient Lines/Drains/Airways Status    Active LINES/DRAINS/AIRWAYS     Name: Placement date: Placement time: Site: Days: Last dressing change:    Peripheral IV 04/06/17 Right Upper arm 04/06/17   1029   Upper arm   1             Patient Lines/Drains/Airways Status    Active PICC/CVC     None            Intake/Output Detail Report     Date Intake   Output    Shift I.V. IV Piggyback Total Total       Noc 04/05/17 2300 - 04/06/17 0659 -- -- -- -- 0    Day 04/06/17 0700 - 04/06/17 1459 -- -- -- -- 0    Chanel 04/06/17 1500 - 04/06/17 2259 -- -- -- -- 0    Noc 04/06/17 2300 - 04/07/17 0659 830 -- 830 -- 830    Day 04/07/17 0700 - 04/07/17 1459 -- -- -- -- 0      Last Void/BM       Most Recent Value    Urine Occurrence 1 at 04/07/2017 0200    Stool Occurrence 1 at 04/06/2017 1810      Case Management/Discharge Planning     Case Management/Discharge Planning Flowsheet     EXPECTED DISCHARGE     QUESTION TO PATIENT: Do you feel safe going back to the place where you are living?  yes 04/06/17 1502    Expected Discharge Date  04/08/17 (DRG OBS 1.0< home) 04/07/17 0920   OBSERVATION: Is there reason to believe there has been maltreatment of a vulnerable adult (ie. Physical/Sexual/Emotional abuse, self neglect, lack of adequate food, shelter, medical care, or financial exploitation)?  no 04/06/17 1502    ABUSE RISK SCREEN     (R) MENTAL HEALTH SUICIDE RISK      QUESTION TO PATIENT:  Has a member of your family or a partner(now or in the past) intimidated, hurt, manipulated, or controlled you in any way?  no 04/06/17 1502   Are you depressed or being treated for depression?  Yes 04/06/17 1502            "

## 2017-04-06 NOTE — IP AVS SNAPSHOT
MRN:[id number]                      After Visit Summary   4/6/2017    Anastasiya Mota    MRN: [id number]           Thank you!     Thank you for choosing [facility] for your care. Our goal is always to provide you with excellent care. Hearing back from our patients is one way we can continue to improve our services. Please take a few minutes to complete the written survey that you may receive in the mail after you visit. If you would like to speak to someone directly about your visit please contact Patient Relations at [phone]. Thank you!          Patient Information     Date Of Birth          5/1/1929        About your hospital stay     You were admitted on:  April 6, 2017 You last received care in the:  [facility] Observation Department    You were discharged on:  April 7, 2017        Reason for your hospital stay       Bright red blood per rectum likely due to diverticular bleeding with concomitant coumadin use. Pt was discussed with Dr. Peterson, agreed with stopping coumadin. No further bleeding noted. Hemoglobin remained stable.                  Who to Call     For medical emergencies, please call 911.  For non-urgent questions about your medical care, please call your primary care provider or clinic, [phone]          Attending Provider     Provider Specialty    Sondra Santizo MD Emergency Medicine    [place] MD Internal Medicine       Primary Care Provider Office Phone # Fax #    [facility] [phone] [address] [facility] [address]         When to contact your care team       Call your primary doctor if you have any of the following: temperature greater than 101, increased shortness of breath or recurrent profuse blood in stool.                  After Care Instructions     Activity       Your activity upon discharge: activity as tolerated            Diet       Follow this diet upon  "discharge: Regular                  Follow-up Appointments     Follow-up and recommended labs and tests        Follow up with Dr. Peterson as previously scheduled.  Follow up with PCP in 1-2 weeks for hospital follow up regarding GI bleed.  Wear cardiac event monitor for 3 weeks  Start baby aspirin on Sunday morning.                  Your next 10 appointments already scheduled     Apr 26, 2017  1:10 PM CDT   Return Discharge with Bella Chavira, APRN CNP   [facility] [place] ([facility])    [address]  [facility] [id number] [phone]                         Pending Results     Date and Time Order Name Status Description    4/7/2017 0800 Cardiac event monitor In process             Statement of Approval     Ordered          04/07/17 5998  I have reviewed and agree with all the recommendations and orders detailed in this document.  EFFECTIVE NOW     Approved and electronically signed by:  Katharina Page PA-C             Admission Information     Date & Time Provider Department Dept. Phone    4/6/2017 Steven Melo MD [facility] Observation Department [phone]      Your Vitals Were     Blood Pressure Pulse Temperature Respirations Height Weight    157/61 (BP Location: Right arm) 65 98.4  F (36.9  C) (Oral) 16 1.6 m (5' 3\") 114.9 kg (253 lb 3.2 oz)    Pulse Oximetry BMI (Body Mass Index)                92% 44.85 kg/m2          [employer] Information     [employer] lets you send messages to your doctor, view your test results, renew your prescriptions, schedule appointments and more. To sign up, go to [email or web address] . Click on \"Log in\" on the left side of the screen, which will take you to the Welcome page. Then click on \"Sign up Now\" on the right side of the page.     You will be asked to enter the access code listed below, as well as some personal information. Please follow the directions to create your username " and password.     Your access code is: [email or web address]  Expires: 2017 10:51 AM     Your access code will  in 90 days. If you need help or a new code, please call your [place] clinic or [phone].        Care EveryWhere ID     This is your Care EveryWhere ID. This could be used by other organizations to access your [place] medical records  [id number]           Review of your medicines      START taking        Dose / Directions    aspirin 81 MG EC tablet   Used for:  CVA (cerebral vascular accident) (H)        Dose:  81 mg   Take 1 tablet (81 mg) by mouth daily   Quantity:  90 tablet   Refills:  3         CONTINUE these medicines which have NOT CHANGED        Dose / Directions    ACETAMINOPHEN PO        Dose:  1000 mg   Take 1,000 mg by mouth 3 times daily as needed   Refills:  0       ANASTROZOLE PO        Dose:  1 mg   Take 1 mg by mouth daily   Refills:  0       atorvastatin 40 MG tablet   Commonly known as:  LIPITOR        Dose:  40 mg   Take 40 mg by mouth every evening   Refills:  0       carvedilol 12.5 MG tablet   Commonly known as:  COREG   Used for:  Atrial flutter, unspecified type (H), SVT (supraventricular tachycardia) (H)        Dose:  12.5 mg   Take 1 tablet (12.5 mg) by mouth 2 times daily (with meals)   Quantity:  60 tablet   Refills:  3       diltiazem 120 MG 24 hr capsule   Used for:  Atrial flutter, unspecified type (H), SVT (supraventricular tachycardia) (H)        Dose:  120 mg   Take 1 capsule (120 mg) by mouth daily   Quantity:  30 capsule   Refills:  0       ferrous sulfate 325 (65 FE) MG tablet   Commonly known as:  IRON        Dose:  325 mg   Take 325 mg by mouth daily (with breakfast)   Refills:  0       HYDROCHLOROTHIAZIDE PO        Dose:  25 mg   Take 25 mg by mouth daily   Refills:  0       LANSOPRAZOLE PO        Dose:  30 mg   Take 30 mg by mouth every morning (before breakfast)   Refills:  0       LISINOPRIL PO        Dose:  20 mg   Take 20 mg by mouth daily   Refills:  0        MIRALAX powder   Generic drug:  polyethylene glycol        Dose:  17 g   Take 17 g by mouth 2 times daily   Refills:  0       SENEXON-S 8.6-50 MG per tablet   Generic drug:  senna-docusate        Dose:  2 tablet   Take 2 tablets by mouth 2 times daily   Refills:  0       ZOFRAN PO        Dose:  4 mg   Take 4 mg by mouth every 8 hours as needed for nausea or vomiting   Refills:  0       ZOLOFT PO        Dose:  50 mg   Take 50 mg by mouth daily   Refills:  0         STOP taking     WARFARIN SODIUM PO                Where to get your medicines      Some of these will need a paper prescription and others can be bought over the counter. Ask your nurse if you have questions.     You don't need a prescription for these medications     aspirin 81 MG EC tablet                Protect others around you: Learn how to safely use, store and throw away your medicines at www.disposemymeds.org.             Medication List: This is a list of all your medications and when to take them. Check marks below indicate your daily home schedule. Keep this list as a reference.      Medications           Morning Afternoon Evening Bedtime As Needed    ACETAMINOPHEN PO   Take 1,000 mg by mouth 3 times daily as needed                                ANASTROZOLE PO   Take 1 mg by mouth daily   Last time this was given:  1 mg on 4/7/2017  8:51 AM                                aspirin 81 MG EC tablet   Take 1 tablet (81 mg) by mouth daily                                atorvastatin 40 MG tablet   Commonly known as:  LIPITOR   Take 40 mg by mouth every evening   Last time this was given:  40 mg on 4/6/2017  7:45 PM                                carvedilol 12.5 MG tablet   Commonly known as:  COREG   Take 1 tablet (12.5 mg) by mouth 2 times daily (with meals)   Last time this was given:  12.5 mg on 4/7/2017  8:51 AM                                diltiazem 120 MG 24 hr capsule   Take 1 capsule (120 mg) by mouth daily   Last time this was given:   120 mg on 4/7/2017  8:51 AM                                ferrous sulfate 325 (65 FE) MG tablet   Commonly known as:  IRON   Take 325 mg by mouth daily (with breakfast)                                HYDROCHLOROTHIAZIDE PO   Take 25 mg by mouth daily   Last time this was given:  25 mg on 4/7/2017  8:51 AM                                LANSOPRAZOLE PO   Take 30 mg by mouth every morning (before breakfast)                                LISINOPRIL PO   Take 20 mg by mouth daily   Last time this was given:  20 mg on 4/7/2017  8:50 AM                                MIRALAX powder   Take 17 g by mouth 2 times daily   Generic drug:  polyethylene glycol                                SENEXON-S 8.6-50 MG per tablet   Take 2 tablets by mouth 2 times daily   Generic drug:  senna-docusate                                ZOFRAN PO   Take 4 mg by mouth every 8 hours as needed for nausea or vomiting                                ZOLOFT PO   Take 50 mg by mouth daily   Last time this was given:  50 mg on 4/7/2017  8:51 AM

## 2017-04-06 NOTE — IP AVS SNAPSHOT
` `     [facility] OBSERVATION DEPARTMENT: [phone]            Medication Administration Report for Anastasiya Mota as of 04/07/17 1255   Legend:    Given Hold Not Given Due Canceled Entry Other Actions    Time Time (Time) Time  Time-Action       Inactive    Active    Linked        Medications 04/01/17 04/02/17 04/03/17 04/04/17 04/05/17 04/06/17 04/07/17      Rate: 75 mL/hr Freq: CONTINUOUS Route: IV  Last Dose: Stopped (04/07/17 0849)  Start: 04/06/17 2330   End: 04/07/17 0729 2021 ( )-New Bag        0109 ( )-New Bag       0729-Med Discontinued  0849-Stopped           acetaminophen (TYLENOL) tablet 1,000 mg  Dose: 1,000 mg Freq: 3 TIMES DAILY PRN Route: PO  PRN Reason: other  PRN Comment: pain  Start: 04/06/17 1445   Admin Instructions: Maximum acetaminophen dose from all sources = 75 mg/kg/day not to exceed 4 gram               anastrozole (ARIMIDEX) tablet 1 mg  Dose: 1 mg Freq: DAILY Route: PO  Indications of Use: 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           0851 (120 mg)-Given           hydrochlorothiazide (HYDRODIURIL) tablet 25 mg  Dose: 25 mg Freq: DAILY Route: PO  Start: 04/07/17 0800          0851 (25 mg)-Given           lisinopril (PRINIVIL/ZESTRIL) tablet 20 mg  Dose: 20 mg Freq: DAILY Route: PO  Start: 04/07/17 0800          0850 (20 mg)-Given           naloxone (NARCAN) injection 0.1-0.4 mg  Dose:  0.1-0.4 mg Freq: EVERY 2 MIN PRN Route: IV  PRN Reason: opioid reversal  Start: 04/06/17 1445   Admin Instructions: For respiratory rate LESS than or EQUAL to 8.  Partial reversal dose:  0.1 mg titrated q 2 minutes for Analgesia Side Effects Monitoring Sedation Level of 3 (frequently drowsy, arousable, drifts to sleep during conversation).Full reversal dose:  0.4 mg bolus for Analgesia Side Effects Monitoring Sedation Level of 4 (somnolent, minimal or no response to stimulation).               ondansetron (ZOFRAN-ODT) ODT tab 4 mg  Dose: 4 mg Freq: EVERY 8 HOURS PRN Route: PO  PRN Reasons: nausea,vomiting  Start: 04/06/17 1445         1551 (4 mg)-Given        0857 (4 mg)-Given           pantoprazole (PROTONIX) EC tablet 40 mg  Dose: 40 mg Freq: EVERY MORNING BEFORE BREAKFAST Route: PO  Start: 04/06/17 1600   Admin Instructions: Formulary auto-sub for lansoprazole 30mg qam.          1551 (40 mg)-Given        0849 (40 mg)-Given           prochlorperazine (COMPAZINE) injection 5 mg  Dose: 5 mg Freq: EVERY 6 HOURS PRN Route: IV  PRN Reasons: nausea,vomiting  Start: 04/07/17 1021                    Or  prochlorperazine (COMPAZINE) tablet 5 mg  Dose: 5 mg Freq: EVERY 6 HOURS PRN Route: PO  PRN Reasons: nausea,vomiting  Start: 04/07/17 1021          1147 (5 mg)-Given          Or  prochlorperazine (COMPAZINE) Suppository 12.5 mg  Dose: 12.5 mg Freq: EVERY 12 HOURS PRN Route: RE  PRN Reasons: nausea,vomiting  Start: 04/07/17 1021                     sertraline (ZOLOFT) tablet 50 mg  Dose: 50 mg Freq: DAILY Route: PO  Start: 04/06/17 1600         1728 (50 mg)-Given        0851 (50 mg)-Given          Completed Medications  Medications 04/01/17 04/02/17 04/03/17 04/04/17 04/05/17 04/06/17 04/07/17         Dose: 1,000 mL Freq: ONCE Route: IV  Last Dose: Stopped (04/06/17 1140)  Start: 04/06/17 1047   End: 04/06/17 1140                1051 (1,000 mL)-New Bag       1140-Stopped              Dose: 20 mg Freq: ONCE Route: PO  Start:  04/06/17 2115   End: 04/06/17 2209   Admin Instructions: Hold for SBP < 100          2209 (20 mg)-Given           Discontinued Medications  Medications 04/01/17 04/02/17 04/03/17 04/04/17 04/05/17 04/06/17 04/07/17         Rate: 125 mL/hr Freq: CONTINUOUS Route: IV  Last Dose: Stopped (04/06/17 1426)  Start: 04/06/17 1048   End: 04/06/17 1445   Admin Instructions: Administer after the bolus.                 1142 (1,000 mL)-New Bag       1426-ED Infusing on Admission/transfer       1445-Med Discontinued

## 2017-04-06 NOTE — H&P
[facility]    History and Physical  Hospitalist       Date of Admission:  4/6/2017  Date of Service (when I saw the patient): 04/06/17    Assessment & Plan   Anastasiya Mota is a 87 year old female with history breast CA (on anastrozole, I do not see records in EPIC or Care Everywhere), HTN, SVT (ablation with Dr. Peterson in March), GERD, dyslipidemia, history lower GI bleed (presumed diverticular) at May in 2015 with complicated course including hemorrhagic shock, NSTEMI, stroke and a fib who presents with bright red blood per rectum.     1. Neuro- on PRN Tylenol for pain, history of CVA in setting of hospitalization in 2015. Plan to DC with ASA once stable.  2. Cardiovascular- history SVT with ablation in March 2017. History a fib in setting of acute illness/hospitalization. Has been on coumadin. I personally spoke with Dr. Peterson. He agrees with plan to DC coumadin in setting of GI bleed and distant history a fib in hospitalized setting. When patient is ready for DC, can start ASA and permanently DC coumadin for now. Will DC with event monitor to assess for parox a fib (ordered for tomorrow)  3. Pulmonary- no issues  4. GI- BRBPR.. Likely diverticular bleed (known diverticular disease). Hb and vitals stable. No bleeding since about 9am. Will monitor. No serial Hb unless bleeds again tonight, then can check. GERD- cont home PPI. Clear liquid diet. Hold senna and miralax  5. ID- no issues  6. Renal- IVF overnight then stop  7. Heme/Onc- GI bleed. Hb stable. Check tonight if has further bleed, otherwise check in am  8. Endo- not diabetic  9. Musculoskeletal- ambulate with assist  10. Prophylaxis- holding coumadin  11. DNR/DNI- discussed with patient and daughter  12. From Assisted Living. Hopefully can DC tomorrow      Steven Melo MD, Hospitalist  Pager: [phone]    Disposition: Expected discharge in hopefully tomorrow.    Steven Melo MD    Primary Care Physician   [facility]    Chief Complaint   BRBPR    History is obtained from the patient, daughter. Sign out obtained from Dr. Santizo in the ED    History of Present Illness   Anastasiya Mota is a 87 year old female with history breast CA (on anastrozole, I do not see records in EPIC or Care Everywhere), HTN, SVT (ablation with Dr. Peterson in March), GERD, dyslipidemia, history lower GI bleed (presumed diverticular) at May in 2015 with complicated course including hemorrhagic shock, NSTEMI, stroke and a fib who presents with bright red blood per rectum. Patient states she usually has constipation and is treated with senna and miralax. She had loose brown stool yesterday, but had 2 bouts of BRBPR this am before 9am. Her assisted living called her daughter who brought her here. Patient is unclear if she had clots (she did not look). Toilet water was red and had red on TP. Patient denies chest pain, sob, abdo pain. She denies fever, chills, uri or urinary symptoms. She has chronic nausea, no change. She has had no further bleeding since in ED.       Past Medical History    I have reviewed this patient's medical history and updated it with pertinent information if needed.   Past Medical History:   Diagnosis Date     Acid reflux      Atrial fibrillation (H)      Breast cancer (H)      Cancer (H)      Cerebral infarction (H)      Constipation      Elevated cholesterol      Hypertension      Nausea      Paroxysmal supraventricular tachycardia (H)      SVT (supraventricular tachycardia) (H)        Past Surgical History   I have reviewed this patient's surgical history and updated it with pertinent information if needed.  No past surgical history on file.    Prior to Admission Medications   Prior to Admission Medications   Prescriptions Last Dose Informant Patient Reported? Taking?   ACETAMINOPHEN PO 4/6/2017 at Unknown time  Yes Yes   Sig: Take 1,000 mg by mouth 3 times daily as needed    ANASTROZOLE PO 4/5/2017 at Unknown time  Yes Yes    Sig: Take 1 mg by mouth daily    HYDROCHLOROTHIAZIDE PO 4/6/2017 at Unknown time  Yes Yes   Sig: Take 25 mg by mouth daily   LANSOPRAZOLE PO 4/5/2017 at Unknown time  Yes Yes   Sig: Take 30 mg by mouth every morning (before breakfast)    LISINOPRIL PO 4/6/2017 at Unknown time  Yes Yes   Sig: Take 20 mg by mouth daily   Ondansetron HCl (ZOFRAN PO) Unknown at Unknown time  Yes No   Sig: Take 4 mg by mouth every 8 hours as needed for nausea or vomiting   Sertraline HCl (ZOLOFT PO) 4/5/2017 at Unknown time  Yes Yes   Sig: Take 50 mg by mouth daily   WARFARIN SODIUM PO 4/5/2017 at Unknown time  Yes Yes   Sig: Take 3 mg by mouth daily   atorvastatin (LIPITOR) 40 MG tablet 4/5/2017 at Unknown time  Yes Yes   Sig: Take 40 mg by mouth every evening    carvedilol (COREG) 12.5 MG tablet 4/5/2017 at Unknown time  Yes Yes   Sig: Take 1 tablet (12.5 mg) by mouth 2 times daily (with meals)   diltiazem 120 MG 24 hr capsule 4/6/2017 at Unknown time  No Yes   Sig: Take 1 capsule (120 mg) by mouth daily   ferrous sulfate (IRON) 325 (65 FE) MG tablet 4/5/2017 at Unknown time  Yes Yes   Sig: Take 325 mg by mouth daily (with breakfast)    polyethylene glycol (MIRALAX) powder 4/5/2017 at Unknown time  Yes Yes   Sig: Take 17 g by mouth 2 times daily    senna-docusate (SENEXON-S) 8.6-50 MG per tablet 4/5/2017 at Unknown time  Yes Yes   Sig: Take 2 tablets by mouth 2 times daily      Facility-Administered Medications: None     Allergies   No Known Allergies    Social History   I have reviewed this patient's social history and updated it with pertinent information if needed. Anastasiya Mota  reports that she has never smoked. She does not have any smokeless tobacco history on file. She reports that she does not drink alcohol.    Family History   I have reviewed this patient's family history and updated it with pertinent information if needed.   Family History   Problem Relation Age of Onset     Unknown/Adopted No family hx of    patient  "states parents  of \"old age\"    Review of Systems   The 10 point Review of Systems is negative other than noted in the HPI or here.     Physical Exam   Temp: 98  F (36.7  C) Temp src: Oral BP: 188/88 Pulse: 62 Heart Rate: 61 Resp: 18 SpO2: 93 % O2 Device: None (Room air)    Vital Signs with Ranges  Temp:  [98  F (36.7  C)] 98  F (36.7  C)  Pulse:  [62] 62  Heart Rate:  [56-61] 61  Resp:  [18] 18  BP: (132-188)/(61-88) 188/88  SpO2:  [93 %-98 %] 93 %  0 lbs 0 oz    Exam:  GEN:  Alert, oriented x 3, appears comfortable, NAD.  HEENT:  Normocephalic/atraumatic, no scleral icterus, no nasal discharge, mouth moist.  CV:  Regular rate and rhythm, no murmur or JVD.  S1 + S2 noted, no S3 or S4.  LUNGS:  Clear to auscultation bilaterally without rales/rhonchi/wheezing/retractions.  Symmetric chest rise on inhalation noted.  ABD:  Active bowel sounds, soft, non-tender/non-distended.  No rebound/guarding/rigidity.  EXT:  No edema or cyanosis.  Hands/feet warm to touch with good signs of peripheral perfusion.  No joint synovitis noted.  SKIN:  Dry to touch, no exanthems noted in the visualized areas.  NEURO:  Symmetric muscle strength, sensation to touch grossly intact.  No new focal deficits appreciated.    Data   Data reviewed today:  I personally reviewed all imaging and EKGs   Results for orders placed or performed during the hospital encounter of 17   CT Abdomen Pelvis w/o Contrast    Narrative    CT ABDOMEN AND PELVIS WITHOUT CONTRAST  2017 12:26 PM     INDICATION: Lower abdominal pain, hematochezia.    TECHNIQUE: Thin axial images through the abdomen and pelvis without  contrast. Coronal reformatted images. Radiation dose for this scan was  reduced using automated exposure control, adjustment of the mA and/or  kV according to patient size, or iterative reconstruction technique.    COMPARISON: None.    FINDINGS: 0.3 cm nonobstructing stone left lower kidney. No ureteral  stones or hydronephrosis. No bladder " calculi. 4 cm slightly complex  cyst at the upper pole of the right kidney with some thin peripheral  calcification. 1.2 cm high attenuation lesion at the lower pole of  left kidney is probably a hemorrhagic cyst though indeterminate. There  are additional smaller low-dense left renal lesions which are likely  cysts. Liver, gallbladder, spleen, pancreas and adrenal glands  demonstrate no worrisome findings without the benefit of contrast.  Small esophageal hiatal hernia.    Uterus is present. No suspicious pelvic masses. Colonic diverticulosis  most prominent in the descending and sigmoid colon. Degenerative  disease throughout the visualized spine with multilevel degenerative  disc disease in the lumbar spine. No destructive bone lesions.      Impression    IMPRESSION:  1. Colonic diverticulosis without diverticulitis.  2. 1.2 cm high density left renal lesion is indeterminate though most  likely a hemorrhagic/proteinaceous cyst. If there are no old  comparison studies, follow-up imaging could be performed with MRI to  confirm this.   3. Small nonobstructing left renal stone. No ureteral stones or  hydronephrosis.         Recent Labs  Lab 04/06/17  1030   WBC 6.1   HGB 12.4   HCT 41.3   MCV 88             Lab Results   Component Value Date     04/06/2017     03/20/2017     03/06/2017    Lab Results   Component Value Date    CHLORIDE 104 04/06/2017    CHLORIDE 107 03/20/2017    CHLORIDE 106 03/06/2017    Lab Results   Component Value Date    BUN 27 04/06/2017    BUN 25 03/20/2017    BUN 19 03/06/2017      Lab Results   Component Value Date    POTASSIUM 4.6 04/06/2017    POTASSIUM 4.2 03/20/2017    POTASSIUM 4.0 03/06/2017    Lab Results   Component Value Date    CO2 31 04/06/2017    CO2 33 03/20/2017    CO2 30 03/06/2017    Lab Results   Component Value Date    CR 1.31 04/06/2017    CR 1.37 03/20/2017    CR 1.32 03/06/2017          Recent Results (from the past 24 hour(s))   CT Abdomen  Pelvis w/o Contrast    Narrative    CT ABDOMEN AND PELVIS WITHOUT CONTRAST  4/6/2017 12:26 PM     INDICATION: Lower abdominal pain, hematochezia.    TECHNIQUE: Thin axial images through the abdomen and pelvis without  contrast. Coronal reformatted images. Radiation dose for this scan was  reduced using automated exposure control, adjustment of the mA and/or  kV according to patient size, or iterative reconstruction technique.    COMPARISON: None.    FINDINGS: 0.3 cm nonobstructing stone left lower kidney. No ureteral  stones or hydronephrosis. No bladder calculi. 4 cm slightly complex  cyst at the upper pole of the right kidney with some thin peripheral  calcification. 1.2 cm high attenuation lesion at the lower pole of  left kidney is probably a hemorrhagic cyst though indeterminate. There  are additional smaller low-dense left renal lesions which are likely  cysts. Liver, gallbladder, spleen, pancreas and adrenal glands  demonstrate no worrisome findings without the benefit of contrast.  Small esophageal hiatal hernia.    Uterus is present. No suspicious pelvic masses. Colonic diverticulosis  most prominent in the descending and sigmoid colon. Degenerative  disease throughout the visualized spine with multilevel degenerative  disc disease in the lumbar spine. No destructive bone lesions.      Impression    IMPRESSION:  1. Colonic diverticulosis without diverticulitis.  2. 1.2 cm high density left renal lesion is indeterminate though most  likely a hemorrhagic/proteinaceous cyst. If there are no old  comparison studies, follow-up imaging could be performed with MRI to  confirm this.   3. Small nonobstructing left renal stone. No ureteral stones or  hydronephrosis.

## 2017-04-06 NOTE — PHARMACY-ADMISSION MEDICATION HISTORY
Admission medication history interview status for this patient is complete. See [facility] admission navigator for allergy information, prior to admission medications and immunization status.     Medication history interview source(s):Family  Medication history resources (including written lists, pill bottles, clinic record):Family had med list  Primary pharmacy:    Changes made to PTA medication list:  Added: lisinopril and sertraline  Deleted:   Changed:     Actions taken by pharmacist (provider contacted, etc):None     Additional medication history information:None    Medication reconciliation/reorder completed by provider prior to medication history? No    For patients on insulin therapy:        (Yes/No)  Lantus/levemir/NPH/Mix 70/30 dose:  _____   in AM/PM  or twice daily   Sliding scale Novolog Y/N  If Yes, do you have a baseline novolog pre-meal dose:  ______units with meals   Patients eat three meals a day:   Y/N     Any Barriers to therapy:  cost of medications/comfortable with giving injections (if applicable)/ comfortable and confident with current diabetes regimen       Prior to Admission medications    Medication Sig Last Dose Taking? Auth Provider   WARFARIN SODIUM PO Take 3 mg by mouth daily 4/5/2017 at Unknown time Yes Unknown, Entered By History   LISINOPRIL PO Take 20 mg by mouth daily 4/6/2017 at Unknown time Yes Unknown, Entered By History   Sertraline HCl (ZOLOFT PO) Take 50 mg by mouth daily 4/5/2017 at Unknown time Yes Unknown, Entered By History   carvedilol (COREG) 12.5 MG tablet Take 1 tablet (12.5 mg) by mouth 2 times daily (with meals) 4/5/2017 at Unknown time Yes Layla Peterson MD   diltiazem 120 MG 24 hr capsule Take 1 capsule (120 mg) by mouth daily 4/6/2017 at Unknown time Yes Amilcar Yip MD   ferrous sulfate (IRON) 325 (65 FE) MG tablet Take 325 mg by mouth daily (with breakfast)  4/5/2017 at Unknown time Yes Reported, Patient   ANASTROZOLE PO Take 1 mg by  mouth daily  4/5/2017 at Unknown time Yes Reported, Patient   HYDROCHLOROTHIAZIDE PO Take 25 mg by mouth daily 4/6/2017 at Unknown time Yes Reported, Patient   LANSOPRAZOLE PO Take 30 mg by mouth every morning (before breakfast)  4/5/2017 at Unknown time Yes Reported, Patient   polyethylene glycol (MIRALAX) powder Take 17 g by mouth 2 times daily  4/5/2017 at Unknown time Yes Reported, Patient   senna-docusate (SENEXON-S) 8.6-50 MG per tablet Take 2 tablets by mouth 2 times daily 4/5/2017 at Unknown time Yes Reported, Patient   atorvastatin (LIPITOR) 40 MG tablet Take 40 mg by mouth every evening  4/5/2017 at Unknown time Yes Reported, Patient   ACETAMINOPHEN PO Take 1,000 mg by mouth 3 times daily as needed  4/6/2017 at Unknown time Yes Reported, Patient   Ondansetron HCl (ZOFRAN PO) Take 4 mg by mouth every 8 hours as needed for nausea or vomiting Unknown at Unknown time  Unknown, Entered By History

## 2017-04-06 NOTE — ED PROVIDER NOTES
History     Chief Complaint:  Rectal Bleeding    HPI   Anastasiya Mota is a 87 year old female, currently anticoagulated with warfarin, admitted in 2015 at [place] for a severe and complicated GI bleed with related shock, NSTEMI and stroke, who presents with rectal bleeding. She was actually started on warfarin at discharge from that visit because of the strokes; she had no previous risk factors for GI bleeding. Today, the patient's assisted living staff alerted the patient's daughter after noting evelia blood in the toilet. The patient's daughter confirms the toilet bowl was filled with BRB. This was the only episode of its kind. The patient has had normal PO intake. There has been associated lightheadedness or dizziness but patient notes this is improved. No abdominal pain, nausea or vomiting. INR has not recently been checked.     Allergies:  No Known Drug Allergies      Medications:    carvedilol (COREG) 12.5 MG tablet   diltiazem 120 MG 24 hr capsule   warfarin (COUMADIN) 1 MG tablet   ferrous sulfate (IRON) 325 (65 FE) MG tablet   ANASTROZOLE PO   HYDROCHLOROTHIAZIDE PO   LANSOPRAZOLE PO   ONDANSETRON PO   polyethylene glycol (MIRALAX) powder   senna-docusate (SENEXON-S) 8.6-50 MG per tablet   atorvastatin (LIPITOR) 40 MG tablet   ACETAMINOPHEN PO   bisacodyl (DULCOLAX) 10 MG Suppository   MELATONIN PO   ondansetron (ZOFRAN ODT) 4 MG ODT tab     Past Medical History:    Acid reflux  A-Fib  Breast cancer  Cerebral infarct  Constipation  Elevated cholesterol  Nausea  HTN  SVT    Past Surgical History:    History reviewed. No pertinent past surgical history.     Family History:    The patient denies any relevant family medical history.     Social History:  The patient was accompanied to the ED by daughter.  Smoking Status: Never smoker  Smokeless Tobacco: No  Alcohol Use: No   Marital Status:   [5]     Review of Systems   Constitutional: Negative for fever.   Gastrointestinal: Positive for blood in stool.  Negative for abdominal pain, diarrhea, nausea and vomiting.   Neurological: Positive for dizziness and light-headedness.   All other systems reviewed and are negative.    Physical Exam   Vitals:  Patient Vitals for the past 24 hrs:   BP Temp Temp src Pulse Heart Rate Resp SpO2   04/06/17 1200 188/88 - - - 61 - 93 %   04/06/17 1145 172/78 - - - 56 - 95 %   04/06/17 1130 141/67 - - - 61 - 96 %   04/06/17 1115 133/61 - - - 60 - 96 %   04/06/17 1100 155/63 - - - 60 - -   04/06/17 1045 149/78 - - - 61 - 98 %   04/06/17 1017 132/86 98  F (36.7  C) Oral 62 - 18 97 %     Physical Exam  VITAL SIGNS: /88  Pulse 62  Temp 98  F (36.7  C) (Oral)  Resp 18  SpO2 93%   Constitutional:  Well developed, Well nourished, completely comfortable appearing   HENT:  Bilateral external ears normal, Mucous membranes moist, Nose normal. Neck- Normal range of motion, Supple  Respiratory:  Normal breath sounds, No respiratory distress, No wheezing,  Cardiovascular:  Normal heart rate, Normal rhythm, No murmurs,    GI:  Bowel sounds normal, Soft, Nondistended, Mild lower abdominal tenderness. No hemorrhoids or other abnormalities seen. no rebound or guarding  Musculoskeletal:  Intact distal pulses, No edema, grossly unremarkable range of motion   Integument:  Warm, Dry   Neurologic:  Alert, attentive and appropriately oriented  Psychiatric:  Mood and affect normal.     Emergency Department Course     Imaging:  Radiology findings were communicated with the patient who voiced understanding of the findings.  CT Abdomen and Pelvis:  1. Colonic diverticulosis without diverticulitis.  2. 1.2 cm high density left renal lesion is indeterminate though most  likely a hemorrhagic/proteinaceous cyst. If there are no old  comparison studies, follow-up imaging could be performed with MRI to  confirm this.   3. Small nonobstructing left renal stone. No ureteral stones or  Hydronephrosis.  Reading per radiology.     Laboratory:  Laboratory findings  were communicated with the patient who voiced understanding of the findings.  CBC: AWWNL. (WBC 6.1, HGB 12.4, )   BMP: Creatinine: 1.31 (H), GFR Estimate: 38 (L), Calcium: 8.3 (L) o/w WNL  INR: 1.84 (H)  Lactic Acid: 1.0  Blood Type: O Positive    Interventions:  1051 Normal Saline 1000 mL IV   1142 Normal Saline 1000 mL IV      Emergency Department Course:  Nursing notes and vitals reviewed.  I performed an exam of the patient as documented above.   IV was inserted and blood was drawn for laboratory testing, results above.  The patient was sent for a CT abdomen while in the emergency department, results above.    At 1219 the patient was rechecked and I updated the daughter on test results. The patient has not had any episodes of bleeding in the ED.      I discussed the treatment plan with the patient. They expressed understanding of this plan and consented to admission. I discussed the patient with KEL Faust, who will admit the patient to a monitored bed for further evaluation and treatment.    I personally reviewed the laboratory results with the Patient and answered all related questions prior to admission.   Impression & Plan      Medical Decision Making:  Anastasiya Mota is a 87 year old female who presents with blood in stool.  I considered a broad differential diagnosis for this patient including upper GIB (ulcer, gastritis, avm, tumor, etc) vs lower GIB (tumor, diverticulosis, avm, meckels, etc), colitis, aortoenteric fistula, hemorrhoids, fissures,  Ischemic colitis, bacterial stool infection (salmonella, shigella, e. Coli, campylobacter).    The workup in the ED is consistent with gastrointestinal bleeding, although it is unclear if it is lower or upper.  I favor lower at this time due to lack of vomiting, lack of preceding black stools, no epigastric pain, no history of ulcers or NSAID use.  No indication to start octreotide as my suspicion of variceal bleed is so low.    Given amount of blood  and her anticoagulation status, I would admit at this point for serial hemoglobins and consideration for further testing ie colonoscopy.  Patient was typed and screened.  Discussed with hospitalist.  They are stable and do not need ICU care at this point.    Did not call GI consult at this time given non-massive bleed and stable patient.  Medicine accepted patient to observation status. Pt and her daughter updated and agree with plan.     Diagnosis:    ICD-10-CM    1. Blood in stool K92.1       Disposition:   Admission    Scribe Disclosure:  Perez PERAZA, am serving as a scribe at 10:18 AM on 4/6/2017 to document services personally performed by Sondra Santizo MD, based on my observations and the provider's statements to me.   4/6/2017   [facility] EMERGENCY DEPARTMENT       Sondra Santizo MD  04/06/17 6130

## 2017-04-06 NOTE — ED NOTES
Patient lives in an assisted living facility, staff notified daughter when bright red blood was seen in the bathroom. History of GI bleed with hospitalization and is currently on blood thinners.

## 2017-04-06 NOTE — IP AVS SNAPSHOT
[facility] Observation Department    [address]    [facility] [address]    Phone:  [phone]                                       After Visit Summary   4/6/2017    Anastasiya Mota    MRN: [id number]           After Visit Summary Signature Page     I have received my discharge instructions, and my questions have been answered. I have discussed any challenges I see with this plan with the nurse or doctor.    ..........................................................................................................................................  Patient/Patient Representative Signature      ..........................................................................................................................................  Patient Representative Print Name and Relationship to Patient    ..................................................               ................................................  Date                                            Time    ..........................................................................................................................................  Reviewed by Signature/Title    ...................................................              ..............................................  Date                                                            Time

## 2017-04-06 NOTE — ED NOTES
Observation Brochure and Video   Observation status based on provider's order. Observation Brochure was given and video watched.  Karmen Patrick RN

## 2017-04-06 NOTE — PLAN OF CARE
Problem: Discharge Planning  Goal: Discharge Planning (Adult, OB, Behavioral, Peds)  ROOM #213-1     Living Situation (if not independent, order SW consult):   Facility name: [facility] [facility]      Activity level at baseline: Ind with walker  Activity level on admit: SBA with walker     Is patient a falls risk? Yes  Reason for falls risk:  Mobility  Falls armband on? YES  Within Arm's Reach? Yes   Bed alarm turned on?   Not applicable  Personal alarm in place and turned on?   Not applicable     Patient registered to observation; given Patient Bill of Rights; given the opportunity to ask questions about observation status and their plan of care.  Patient has been oriented to the observation room, bathroom and call light is in place.     : Daughter-Cyndy

## 2017-04-06 NOTE — IP AVS SNAPSHOT
"    [facility] OBSERVATION DEPARTMENT: [phone]                                              INTERAGENCY TRANSFER FORM - LAB / IMAGING / EKG / EMG RESULTS   2017                    Hospital Admission Date: 2017  MG LOCKETT   : 1929  Sex: Female        Attending Provider: Steven Melo MD     Allergies:  No Known Allergies    Infection:  None   Service:  Observation    Ht:  1.6 m (5' 3\")   Wt:  114.9 kg (253 lb 3.2 oz)   Admission Wt:  114.9 kg (253 lb 3.2 oz)    BMI:  44.85 kg/m 2   BSA:  2.26 m 2            Patient PCP Information     Provider PCP Type    [facility]         Lab Results - 3 Days      INR [[id number]] (Abnormal)  Resulted: 17 07, Result status: Final result    Ordering provider: Steven Melo MD  17 0002 Resulting lab: [facility]    Specimen Information    Type Source Collected On   Blood  17 06          Components       Value Reference Range Flag Lab   INR 1.74 0.86 - 1.14 H FrRdHs            CBC with platelets differential [[id number]] (Abnormal)  Resulted: 17 0652, Result status: Final result    Ordering provider: Steven Melo MD  17 0002 Resulting lab: [facility]    Specimen Information    Type Source Collected On   Blood  17          Components       Value Reference Range Flag Lab   WBC 6.1 4.0 - 11.0 10e9/L  FrRdHs   RBC Count 4.35 3.8 - 5.2 10e12/L  FrRdHs   Hemoglobin 11.6 11.7 - 15.7 g/dL L FrRdHs   Hematocrit 38.9 35.0 - 47.0 %  FrRdHs   MCV 89 78 - 100 fl  FrRdHs   MCH 26.7 26.5 - 33.0 pg  FrRdHs   MCHC 29.8 31.5 - 36.5 g/dL L FrRdHs   RDW 15.1 10.0 - 15.0 % H FrRdHs   Platelet Count 235 150 - 450 10e9/L  FrRdHs   Diff Method Automated Method   FrRdHs   % Neutrophils 59.1 %  FrRdHs   % Lymphocytes 26.4 %  FrRdHs   % Monocytes 10.9 %  FrRdHs   % Eosinophils 2.8 %  FrRdHs   % Basophils 0.5 %  FrRdHs   % Immature Granulocytes 0.3 %  FrRdHs "   Nucleated RBCs 0 0 /100  FrRdHs   Absolute Neutrophil 3.6 1.6 - 8.3 10e9/L  FrRdHs   Absolute Lymphocytes 1.6 0.8 - 5.3 10e9/L  FrRdHs   Absolute Monocytes 0.7 0.0 - 1.3 10e9/L  FrRdHs   Absolute Eosinophils 0.2 0.0 - 0.7 10e9/L  FrRdHs   Absolute Basophils 0.0 0.0 - 0.2 10e9/L  FrRdHs   Abs Immature Granulocytes 0.0 0 - 0.4 10e9/L  FrRdHs   Absolute Nucleated RBC 0.0   FrRdHs            Hemoglobin [[id number]]  Resulted: 04/06/17 2209, Result status: Final result    Ordering provider: Steven Melo MD  04/06/17 1810 Resulting lab: [facility]    Specimen Information    Type Source Collected On   Blood  04/06/17 2150          Components       Value Reference Range Flag Lab   Hemoglobin 12.0 11.7 - 15.7 g/dL  FrRdHs            Lactic acid whole blood [[id number]]  Resulted: 04/06/17 1133, Result status: Final result    Ordering provider: Sondra Santizo MD  04/06/17 1047 Resulting lab: [facility]    Specimen Information    Type Source Collected On   Blood  04/06/17 1030          Components       Value Reference Range Flag Lab   Lactic Acid 1.0 0.7 - 2.1 mmol/L  FrRdHs            INR [[id number]] (Abnormal)  Resulted: 04/06/17 1130, Result status: Final result    Ordering provider: Sondra Santizo MD  04/06/17 1047 Resulting lab: [facility]    Specimen Information    Type Source Collected On   Blood  04/06/17 1030          Components       Value Reference Range Flag Lab   INR 1.84 0.86 - 1.14 H FrRdHs            Basic metabolic panel [[id number]] (Abnormal)  Resulted: 04/06/17 1130, Result status: Final result    Ordering provider: Sondra Santizo MD  04/06/17 1047 Resulting lab: [facility]    Specimen Information    Type Source Collected On   Blood  04/06/17 1030          Components       Value Reference Range Flag Lab   Sodium 141 133 - 144 mmol/L  FrRdHs   Potassium 4.6 3.4 - 5.3 mmol/L  FrRdHs   Comment:  Specimen slightly hemolyzed, potassium may be  falsely elevated   Chloride 104 94 - 109 mmol/L  FrRdHs   Carbon Dioxide 31 20 - 32 mmol/L  FrRdHs   Anion Gap 6 3 - 14 mmol/L  FrRdHs   Glucose 96 70 - 99 mg/dL  FrRdHs   Urea Nitrogen 27 7 - 30 mg/dL  FrRdHs   Creatinine 1.31 0.52 - 1.04 mg/dL H FrRdHs   GFR Estimate 38 >60 mL/min/1.7m2 L FrRdHs   Comment:  Non  GFR Calc   GFR Estimate If Black 46 >60 mL/min/1.7m2 L FrRdHs   Comment:  African American GFR Calc   Calcium 8.3 8.5 - 10.1 mg/dL L FrRdHs            CBC with platelets differential [[id number]] (Abnormal)  Resulted: 04/06/17 1110, Result status: Final result    Ordering provider: Sondra Sanitzo MD  04/06/17 1047 Resulting lab: [facility]    Specimen Information    Type Source Collected On   Blood  04/06/17 1030          Components       Value Reference Range Flag Lab   WBC 6.1 4.0 - 11.0 10e9/L  FrRdHs   RBC Count 4.68 3.8 - 5.2 10e12/L  FrRdHs   Hemoglobin 12.4 11.7 - 15.7 g/dL  FrRdHs   Hematocrit 41.3 35.0 - 47.0 %  FrRdHs   MCV 88 78 - 100 fl  FrRdHs   MCH 26.5 26.5 - 33.0 pg  FrRdHs   MCHC 30.0 31.5 - 36.5 g/dL L FrRdHs   RDW 15.2 10.0 - 15.0 % H FrRdHs   Platelet Count 270 150 - 450 10e9/L  FrRdHs   Diff Method Automated Method   FrRdHs   % Neutrophils 62.1 %  FrRdHs   % Lymphocytes 21.8 %  FrRdHs   % Monocytes 11.0 %  FrRdHs   % Eosinophils 3.8 %  FrRdHs   % Basophils 0.8 %  FrRdHs   % Immature Granulocytes 0.5 %  FrRdHs   Nucleated RBCs 0 0 /100  FrRdHs   Absolute Neutrophil 3.8 1.6 - 8.3 10e9/L  FrRdHs   Absolute Lymphocytes 1.3 0.8 - 5.3 10e9/L  FrRdHs   Absolute Monocytes 0.7 0.0 - 1.3 10e9/L  FrRdHs   Absolute Eosinophils 0.2 0.0 - 0.7 10e9/L  FrRdHs   Absolute Basophils 0.1 0.0 - 0.2 10e9/L  FrRdHs   Abs Immature Granulocytes 0.0 0 - 0.4 10e9/L  FrRdHs   Absolute Nucleated RBC 0.0   FrRdHs            Testing Performed By     Lab - Abbreviation Name Director Address Valid Date Range    12 - FrRdHs [facility] Unknown [address] [facility]  [place] [address] 05/08/15 1057 - Present            Unresulted Labs     None         Imaging Results - 3 Days      CT Abdomen Pelvis w/o Contrast [[id number]]  Resulted: 04/06/17 1541, Result status: Final result    Ordering provider: Sondra Santizo MD  04/06/17 1136 Resulted by: Fredrick Nioxn MD    Performed: 04/06/17 1217 - 04/06/17 1226 Resulting lab: RADIOLOGY RESULTS    Narrative:       CT ABDOMEN AND PELVIS WITHOUT CONTRAST  4/6/2017 12:26 PM     INDICATION: Lower abdominal pain, hematochezia.    TECHNIQUE: Thin axial images through the abdomen and pelvis without  contrast. Coronal reformatted images. Radiation dose for this scan was  reduced using automated exposure control, adjustment of the mA and/or  kV according to patient size, or iterative reconstruction technique.    COMPARISON: None.    FINDINGS: 0.3 cm nonobstructing stone left lower kidney. No ureteral  stones or hydronephrosis. No bladder calculi. 4 cm slightly complex  cyst at the upper pole of the right kidney with some thin peripheral  calcification. 1.2 cm high attenuation lesion at the lower pole of  left kidney is probably a hemorrhagic cyst though indeterminate. There  are additional smaller low-dense left renal lesions which are likely  cysts. Liver, gallbladder, spleen, pancreas and adrenal glands  demonstrate no worrisome findings without the benefit of contrast.  Small esophageal hiatal hernia.    Uterus is present. No suspicious pelvic masses. Colonic diverticulosis  most prominent in the descending and sigmoid colon. Degenerative  disease throughout the visualized spine with multilevel degenerative  disc disease in the lumbar spine. No destructive bone lesions.      Impression:       IMPRESSION:  1. Colonic diverticulosis without diverticulitis.  2. 1.2 cm high density left renal lesion is indeterminate though most  likely a hemorrhagic/proteinaceous cyst. If there are no old  comparison studies, follow-up imaging could be performed  with MRI to  confirm this.   3. Small nonobstructing left renal stone. No ureteral stones or  hydronephrosis.    BLADIMIR CRUZ MD      Testing Performed By     Lab - Abbreviation Name Director Address Valid Date Range    104 - Rad Rslts RADIOLOGY RESULTS Unknown Unknown 02/16/05 1553 - Present            Encounter-Level Documents:     There are no encounter-level documents.      Order-Level Documents:     There are no order-level documents.

## 2017-04-06 NOTE — IP AVS SNAPSHOT
` `           [facility] OBSERVATION DEPARTMENT: [phone]                 INTERAGENCY TRANSFER FORM - NOTES (H&P, Discharge Summary, Consults, Procedures, Therapies)   2017                    Hospital Admission Date: 2017  ANASTASIYA LOCKETT   : 1929  Sex: Female        Patient PCP Information     Provider PCP Type    [facility]         History & Physicals      H&P by Steven Melo MD at 2017  1:41 PM     Author:  Steven Melo MD Service:  Hospitalist Author Type:  Physician    Filed:  2017  2:07 PM Date of Service:  2017  1:41 PM Note Created:  2017  1:41 PM    Status:  Signed :  Steven Melo MD (Physician)         [facility]    History and Physical  Hospitalist       Date of Admission:  2017  Date of Service (when I saw the patient):[JY1.1] 17[JY1.2]    Assessment & Plan[JY1.3]   Anastasiya Lockett is a 87 year old female with history breast CA (on anastrozole, I do not see records in EPIC or Care Everywhere), HTN, SVT (ablation with Dr. Peterson in March), GERD, dyslipidemia, history lower GI bleed (presumed diverticular) at May in  with complicated course including hemorrhagic shock, NSTEMI, stroke and a fib who presents with bright red blood per rectum.     1. Neuro- on PRN Tylenol for pain, history of CVA in setting of hospitalization in . Plan to DC with ASA once stable.  2. Cardiovascular- history SVT with ablation in 2017. History a fib in setting of acute illness/hospitalization. Has been on coumadin. I personally spoke with Dr. Peterson. He agrees with plan to DC coumadin in setting of GI bleed and distant history a fib in hospitalized setting. When patient is ready for DC, can start ASA and permanently DC coumadin for now. Will DC with event monitor to assess for parox a fib (ordered for tomorrow)  3. Pulmonary- no issues  4. GI- BRBPR.. Likely diverticular bleed (known  diverticular disease). Hb and vitals stable. No bleeding since about 9am. Will monitor. No serial Hb unless bleeds again tonight, then can check. GERD- cont home PPI. Clear liquid diet. Hold senna and miralax  5. ID- no issues  6. Renal- IVF overnight then stop  7. Heme/Onc- GI bleed. Hb stable. Check tonight if has further bleed, otherwise check in am  8. Endo- not diabetic  9. Musculoskeletal- ambulate with assist  10. Prophylaxis- holding coumadin  11. DNR/DNI- discussed with patient and daughter  12. From Assisted Living. Hopefully can DC tomorrow      Steven Melo MD, Hospitalist  Pager: [phone]    Disposition: Expected discharge in hopefully tomorrow.[JY1.1]    Steven Melo[JY1.3], MD[JY1.1]    Primary Care Physician   [facility]    Chief Complaint[JY1.3]   BRBPR    History is obtained from the patient, daughter. Sign out obtained from Dr. Santizo in the ED[JY1.1]    History of Present Illness[JY1.3]   Anastasiya Mota is a 87 year old female with history breast CA (on anastrozole, I do not see records in EPIC or Care Everywhere), HTN, SVT (ablation with Dr. Peterson in March), GERD, dyslipidemia, history lower GI bleed (presumed diverticular) at May in 2015 with complicated course including hemorrhagic shock, NSTEMI, stroke and a fib who presents with bright red blood per rectum. Patient states she usually has constipation and is treated with senna and miralax. She had loose brown stool yesterday, but had 2 bouts of BRBPR this am before 9am. Her assisted living called her daughter who brought her here. Patient is unclear if she had clots (she did not look). Toilet water was red and had red on TP. Patient denies chest pain, sob, abdo pain. She denies fever, chills, uri or urinary symptoms. She has chronic nausea, no change. She has had no further bleeding since in ED.[JY1.1]       Past Medical History[JY1.3]    I have reviewed this patient's medical history and updated it with  pertinent information if needed.[JY1.1]   Past Medical History:   Diagnosis Date     Acid reflux      Atrial fibrillation (H)      Breast cancer (H)      Cancer (H)      Cerebral infarction (H)      Constipation      Elevated cholesterol      Hypertension      Nausea      Paroxysmal supraventricular tachycardia (H)      SVT (supraventricular tachycardia) (H)[JY1.4]        Past Surgical History[JY1.3]   I have reviewed this patient's surgical history and updated it with pertinent information if needed.[JY1.1]  No past surgical history on file.[JY1.4]    Prior to Admission Medications   Prior to Admission Medications   Prescriptions Last Dose Informant Patient Reported? Taking?   ACETAMINOPHEN PO 4/6/2017 at Unknown time  Yes Yes   Sig: Take 1,000 mg by mouth 3 times daily as needed    ANASTROZOLE PO 4/5/2017 at Unknown time  Yes Yes   Sig: Take 1 mg by mouth daily    HYDROCHLOROTHIAZIDE PO 4/6/2017 at Unknown time  Yes Yes   Sig: Take 25 mg by mouth daily   LANSOPRAZOLE PO 4/5/2017 at Unknown time  Yes Yes   Sig: Take 30 mg by mouth every morning (before breakfast)    LISINOPRIL PO 4/6/2017 at Unknown time  Yes Yes   Sig: Take 20 mg by mouth daily   Ondansetron HCl (ZOFRAN PO) Unknown at Unknown time  Yes No   Sig: Take 4 mg by mouth every 8 hours as needed for nausea or vomiting   Sertraline HCl (ZOLOFT PO) 4/5/2017 at Unknown time  Yes Yes   Sig: Take 50 mg by mouth daily   WARFARIN SODIUM PO 4/5/2017 at Unknown time  Yes Yes   Sig: Take 3 mg by mouth daily   atorvastatin (LIPITOR) 40 MG tablet 4/5/2017 at Unknown time  Yes Yes   Sig: Take 40 mg by mouth every evening    carvedilol (COREG) 12.5 MG tablet 4/5/2017 at Unknown time  Yes Yes   Sig: Take 1 tablet (12.5 mg) by mouth 2 times daily (with meals)   diltiazem 120 MG 24 hr capsule 4/6/2017 at Unknown time  No Yes   Sig: Take 1 capsule (120 mg) by mouth daily   ferrous sulfate (IRON) 325 (65 FE) MG tablet 4/5/2017 at Unknown time  Yes Yes   Sig: Take 325 mg by  "mouth daily (with breakfast)    polyethylene glycol (MIRALAX) powder 2017 at Unknown time  Yes Yes   Sig: Take 17 g by mouth 2 times daily    senna-docusate (SENEXON-S) 8.6-50 MG per tablet 2017 at Unknown time  Yes Yes   Sig: Take 2 tablets by mouth 2 times daily      Facility-Administered Medications: None     Allergies   No Known Allergies    Social History[JY1.3]   I have reviewed this patient's social history and updated it with pertinent information if needed. Anastasiya Mota[JY1.1]  reports that she has never smoked. She does not have any smokeless tobacco history on file. She reports that she does not drink alcohol.[JY1.4]    Family History[JY1.3]   I have reviewed this patient's family history and updated it with pertinent information if needed.[JY1.1]   Family History   Problem Relation Age of Onset     Unknown/Adopted No family hx of[JY1.4]    patient states parents  of \"old age\"[JY1.1]    Review of Systems[JY1.3]   The 10 point Review of Systems is negative other than noted in the HPI or here.[JY1.1]     Physical Exam   Temp: 98  F (36.7  C) Temp src: Oral BP: 188/88 Pulse: 62 Heart Rate: 61 Resp: 18 SpO2: 93 % O2 Device: None (Room air)[JY1.3]    Vital Signs with Ranges[JY1.1]  Temp:  [98  F (36.7  C)] 98  F (36.7  C)  Pulse:  [62] 62  Heart Rate:  [56-61] 61  Resp:  [18] 18  BP: (132-188)/(61-88) 188/88  SpO2:  [93 %-98 %] 93 %  0 lbs 0 oz[JY1.3]    Exam:  GEN:  Alert, oriented x 3, appears comfortable, NAD.  HEENT:  Normocephalic/atraumatic, no scleral icterus, no nasal discharge, mouth moist.  CV:  Regular rate and rhythm, no murmur or JVD.  S1 + S2 noted, no S3 or S4.  LUNGS:  Clear to auscultation bilaterally without rales/rhonchi/wheezing/retractions.  Symmetric chest rise on inhalation noted.  ABD:  Active bowel sounds, soft, non-tender/non-distended.  No rebound/guarding/rigidity.  EXT:  No edema or cyanosis.  Hands/feet warm to touch with good signs of peripheral perfusion.  No " joint synovitis noted.  SKIN:  Dry to touch, no exanthems noted in the visualized areas.  NEURO:  Symmetric muscle strength, sensation to touch grossly intact.  No new focal deficits appreciated.[JY1.1]    Data[JY1.3]   Data reviewed today:  I personally reviewed all imaging and EKGs[JY1.1]   Results for orders placed or performed during the hospital encounter of 04/06/17   CT Abdomen Pelvis w/o Contrast    Narrative    CT ABDOMEN AND PELVIS WITHOUT CONTRAST  4/6/2017 12:26 PM     INDICATION: Lower abdominal pain, hematochezia.    TECHNIQUE: Thin axial images through the abdomen and pelvis without  contrast. Coronal reformatted images. Radiation dose for this scan was  reduced using automated exposure control, adjustment of the mA and/or  kV according to patient size, or iterative reconstruction technique.    COMPARISON: None.    FINDINGS: 0.3 cm nonobstructing stone left lower kidney. No ureteral  stones or hydronephrosis. No bladder calculi. 4 cm slightly complex  cyst at the upper pole of the right kidney with some thin peripheral  calcification. 1.2 cm high attenuation lesion at the lower pole of  left kidney is probably a hemorrhagic cyst though indeterminate. There  are additional smaller low-dense left renal lesions which are likely  cysts. Liver, gallbladder, spleen, pancreas and adrenal glands  demonstrate no worrisome findings without the benefit of contrast.  Small esophageal hiatal hernia.    Uterus is present. No suspicious pelvic masses. Colonic diverticulosis  most prominent in the descending and sigmoid colon. Degenerative  disease throughout the visualized spine with multilevel degenerative  disc disease in the lumbar spine. No destructive bone lesions.      Impression    IMPRESSION:  1. Colonic diverticulosis without diverticulitis.  2. 1.2 cm high density left renal lesion is indeterminate though most  likely a hemorrhagic/proteinaceous cyst. If there are no old  comparison studies, follow-up  imaging could be performed with MRI to  confirm this.   3. Small nonobstructing left renal stone. No ureteral stones or  hydronephrosis.         Recent Labs  Lab 04/06/17  1030   WBC 6.1   HGB 12.4   HCT 41.3   MCV 88   [JY1.5]          Lab Results   Component Value Date     04/06/2017     03/20/2017     03/06/2017    Lab Results   Component Value Date    CHLORIDE 104 04/06/2017    CHLORIDE 107 03/20/2017    CHLORIDE 106 03/06/2017    Lab Results   Component Value Date    BUN 27 04/06/2017    BUN 25 03/20/2017    BUN 19 03/06/2017      Lab Results   Component Value Date    POTASSIUM 4.6 04/06/2017    POTASSIUM 4.2 03/20/2017    POTASSIUM 4.0 03/06/2017    Lab Results   Component Value Date    CO2 31 04/06/2017    CO2 33 03/20/2017    CO2 30 03/06/2017    Lab Results   Component Value Date    CR 1.31 04/06/2017    CR 1.37 03/20/2017    CR 1.32 03/06/2017[JY1.1]          Recent Results (from the past 24 hour(s))   CT Abdomen Pelvis w/o Contrast    Narrative    CT ABDOMEN AND PELVIS WITHOUT CONTRAST  4/6/2017 12:26 PM     INDICATION: Lower abdominal pain, hematochezia.    TECHNIQUE: Thin axial images through the abdomen and pelvis without  contrast. Coronal reformatted images. Radiation dose for this scan was  reduced using automated exposure control, adjustment of the mA and/or  kV according to patient size, or iterative reconstruction technique.    COMPARISON: None.    FINDINGS: 0.3 cm nonobstructing stone left lower kidney. No ureteral  stones or hydronephrosis. No bladder calculi. 4 cm slightly complex  cyst at the upper pole of the right kidney with some thin peripheral  calcification. 1.2 cm high attenuation lesion at the lower pole of  left kidney is probably a hemorrhagic cyst though indeterminate. There  are additional smaller low-dense left renal lesions which are likely  cysts. Liver, gallbladder, spleen, pancreas and adrenal glands  demonstrate no worrisome findings without the  benefit of contrast.  Small esophageal hiatal hernia.    Uterus is present. No suspicious pelvic masses. Colonic diverticulosis  most prominent in the descending and sigmoid colon. Degenerative  disease throughout the visualized spine with multilevel degenerative  disc disease in the lumbar spine. No destructive bone lesions.      Impression    IMPRESSION:  1. Colonic diverticulosis without diverticulitis.  2. 1.2 cm high density left renal lesion is indeterminate though most  likely a hemorrhagic/proteinaceous cyst. If there are no old  comparison studies, follow-up imaging could be performed with MRI to  confirm this.   3. Small nonobstructing left renal stone. No ureteral stones or  hydronephrosis.[JY1.3]        Revision History        User Key Date/Time User Provider Type Action    > JY1.5 4/6/2017  2:07 PM Steven Melo MD Physician Sign     JY1.4 4/6/2017  2:05 PM Steven Melo MD Physician      JY1.2 4/6/2017  2:01 PM Steven Melo MD Physician      JY1.3 4/6/2017  1:42 PM Steven Melo MD Physician      JY1.1 4/6/2017  1:41 PM Steven Melo MD Physician                   Discharge Summaries     No notes of this type exist for this encounter.      Consult Notes     No notes of this type exist for this encounter.         Progress Notes - Physician (Notes from 04/04/17 through 04/07/17)      Progress Notes by Cyndy Workman at 4/7/2017 10:57 AM     Author:  Cyndy Workman Service:  (none) Author Type:  Technician    Filed:  4/7/2017 10:57 AM Date of Service:  4/7/2017 10:57 AM Note Created:  4/7/2017 10:57 AM    Status:  Signed :  Cyndy Workman (Technician)         Cardiac Event monitor placed. Daughter present and able to assist with monitor over the next 3 weeks. ALl questions answered.[SS1.1]     Revision History        User Key Date/Time User Provider Type Action    > SS1.1 4/7/2017 10:57 AM Cyndy Workman Technician Sign            ED Notes by Karmen Patrick  RN at 4/6/2017  2:19 PM     Author:  Karmen Patrick RN Service:  (none) Author Type:  Registered Nurse    Filed:  4/6/2017  2:20 PM Date of Service:  4/6/2017  2:19 PM Note Created:  4/6/2017  2:20 PM    Status:  Signed :  Karmen Patrick RN (Registered Nurse)         Observation Brochure and Video   Observation status based on provider's order. Observation Brochure was given and video watched.  Karmen Patrick RN[TF1.1]     Revision History        User Key Date/Time User Provider Type Action    > TF1.1 4/6/2017  2:20 PM Karmen Patrick RN Registered Nurse Sign            ED Provider Notes by Sondra Santizo MD at 4/6/2017 10:06 AM     Author:  Sondra Santizo MD Service:  Emergency Medicine Author Type:  Physician    Filed:  4/6/2017  1:51 PM Date of Service:  4/6/2017 10:06 AM Note Created:  4/6/2017 10:18 AM    Status:  Signed :  Sondra Santizo MD (Physician)           History     Chief Complaint:[DH1.1]  Rectal Bleeding[DH1.2]    HPI   Anastasiya Mota is a 87 year old female[DH1.1], currently anticoagulated with warfarin[DH1.3], admitted in 2015 at [facility] for a severe and complicated GI bleed[DH1.4] with related shock, NSTEMI and stroke,[JW1.1] who presents with[DH1.1] rectal bleeding.[DH1.3] She was actually started on warfarin at discharge from that visit because of the strokes; she had no previous risk factors for GI bleeding.[JW1.1] Today, the patient's assisted living staff alerted the patient's daughter after noting evelia blood in the toilet.[DH1.4] The patient's daughter confirms the toilet bowl was filled with BRB.[JW1.1] This was the only episode of its kind. The patient has had normal PO intake. There has been associated lightheadedness or dizziness[DH1.4] but patient notes this is improved.[JW1.1] No abdominal pain, nausea or vomiting. INR has not recently been checked.[DH1.4]     Allergies:  No Known Drug Allergies      Medications:    carvedilol (COREG) 12.5 MG tablet   diltiazem 120 MG  24 hr capsule   warfarin (COUMADIN) 1 MG tablet   ferrous sulfate (IRON) 325 (65 FE) MG tablet   ANASTROZOLE PO   HYDROCHLOROTHIAZIDE PO   LANSOPRAZOLE PO   ONDANSETRON PO   polyethylene glycol (MIRALAX) powder   senna-docusate (SENEXON-S) 8.6-50 MG per tablet   atorvastatin (LIPITOR) 40 MG tablet   ACETAMINOPHEN PO   bisacodyl (DULCOLAX) 10 MG Suppository   MELATONIN PO   ondansetron (ZOFRAN ODT) 4 MG ODT tab     Past Medical History:    Acid reflux  A-Fib  Breast cancer  Cerebral infarct  Constipation  Elevated cholesterol  Nausea  HTN  SVT    Past Surgical History:    History reviewed. No pertinent past surgical history.     Family History:    The patient denies any relevant family medical history.     Social History:  The patient was accompanied to the ED by[DH1.1] daughter[DH1.4].  Smoking Status: Never smoker  Smokeless Tobacco: No  Alcohol Use: No   Marital Status:   [5]     Review of Systems   Constitutional: Negative for[DH1.1] fever[DH1.4].   Gastrointestinal: Positive for[DH1.1] blood in stool[DH1.4]. Negative for[DH1.1] abdominal pain[DH1.4],[DH1.1] diarrhea[DH1.4],[DH1.1] nausea[DH1.4] and[DH1.1] vomiting[DH1.4].   Neurological: Positive for[DH1.1] dizziness[DH1.4] and[DH1.1] light-headedness[DH1.4].[DH1.1]   All other systems reviewed and are negative[DH1.4].    Physical Exam   Vitals:[DH1.1]  Patient Vitals for the past 24 hrs:   BP Temp Temp src Pulse Heart Rate Resp SpO2   04/06/17 1200 188/88 - - - 61 - 93 %   04/06/17 1145 172/78 - - - 56 - 95 %   04/06/17 1130 141/67 - - - 61 - 96 %   04/06/17 1115 133/61 - - - 60 - 96 %   04/06/17 1100 155/63 - - - 60 - -   04/06/17 1045 149/78 - - - 61 - 98 %   04/06/17 1017 132/86 98  F (36.7  C) Oral 62 - 18 97 %[DH1.2]     Physical Exam[DH1.1]  VITAL SIGNS:[DH1.5] /88  Pulse 62  Temp 98  F (36.7  C) (Oral)  Resp 18  SpO2 93%[DH1.2]   Constitutional:  Well developed, Well nourished,[DH1.5] completely comfortable appearing[DH1.6]   HENT:   Bilateral external ears normal, Mucous membranes moist, Nose normal. Neck- Normal range of motion, Supple  Respiratory:  Normal breath sounds, No respiratory distress, No wheezing,  Cardiovascular:  Normal heart rate, Normal rhythm, No murmurs,    GI:  Bowel sounds normal, Soft, Nondistended,[DH1.5] Mild lower abdominal tenderness. No hemorrhoids or other abnormalities seen.[DH1.6] no rebound or guarding  Musculoskeletal:  Intact distal pulses, No edema, grossly unremarkable range of motion   Integument:  Warm, Dry   Neurologic:  Alert, attentive and appropriately oriented  Psychiatric:  Mood and affect normal.[DH1.5]     Emergency Department Course     Imaging:  Radiology findings were communicated with the patient who voiced understanding of the findings.[DH1.1]  CT Abdomen and Pelvis:[DH1.6]  1. Colonic diverticulosis without diverticulitis.  2. 1.2 cm high density left renal lesion is indeterminate though most  likely a hemorrhagic/proteinaceous cyst. If there are no old  comparison studies, follow-up imaging could be performed with MRI to  confirm this.   3. Small nonobstructing left renal stone. No ureteral stones or  Hydronephrosis.  Reading per radiology.[DH1.7]     Laboratory:  Laboratory findings were communicated with the patient who voiced understanding of the findings.[DH1.1]  CBC:[DH1.6] AW[DH1.8]WNL. (WBC[DH1.6] 6.1[DH1.8], HGB[DH1.6] 12.4[DH1.8], PLT[DH1.6] 270[DH1.8])   BMP:[DH1.6] Creatinine: 1.31 (H), GFR Estimate: 38 (L), Calcium: 8.3 (L) o/w[DH1.9] WNL  INR:[DH1.6] 1.84 (H)[DH1.9]  Lactic Acid:[DH1.6] 1.0[DH1.10]  Blood Type:[DH1.8] O Positive[DH1.11]    Interventions:[DH1.1]  1051 Normal Saline 1000 mL IV[DH1.8]   1142 Normal Saline 1000 mL IV[DH1.11]      Emergency Department Course:  Nursing notes and vitals reviewed.  I performed an exam of the patient as documented above.[DH1.1]   IV was inserted and blood was drawn for laboratory testing, results above.[DH1.5]  The patient was sent for a  CT abdomen while in the emergency department, results above.[DH1.6]    At 1219 the patient was rechecked and I updated the daughter on test results. The patient has not had any episodes of bleeding in the ED.[DH1.12]      I discussed the treatment plan with the patient. They expressed understanding of this plan and consented to admission. I discussed the patient with KEL Faust, who will admit the patient to a monitored bed for further evaluation and treatment.[DH1.13]    I personally reviewed the laboratory results with the Patient and answered all related questions prior to[DH1.1] admission.[DH1.14]   Impression & Plan      Medical Decision Making:[DH1.1]  Anastasiya Mota is a 87 year old female who presents with blood in stool.  I considered a broad differential diagnosis for this patient including upper GIB (ulcer, gastritis, avm, tumor, etc) vs lower GIB (tumor, diverticulosis, avm, meckels, etc), colitis, aortoenteric fistula, hemorrhoids, fissures,  Ischemic colitis, bacterial stool infection (salmonella, shigella, e. Coli, campylobacter).    The workup in the ED is consistent with gastrointestinal bleeding, although it is unclear if it is lower or upper.  I favor lower at this time due to lack of vomiting, lack of preceding black stools, no epigastric pain, no history of ulcers or NSAID use.  No indication to start octreotide as my suspicion of variceal bleed is so low.    Given amount of blood and[DH1.7] her anticoagulation status[JW1.2], I would admit at this point for serial hemoglobins[DH1.7] and consideration for further testing ie colonoscopy[JW1.2].  Patient was typed and screened.  Discussed with hospitalist.  They are stable and do not need ICU care at this point.    Did not call GI consult at this time given non-massive bleed and stable patient.[DH1.7]  Medicine accepted patient to observation status. Pt and her daughter updated and agree with plan.[JW1.2]     Diagnosis:[DH1.1]    ICD-10-CM     1. Blood in stool K92.1[DH1.2]       Disposition:[DH1.1]   Admission[DH1.14]    Scribe Disclosure:  I, Perez Candido, am serving as a scribe at 10:18 AM on 4/6/2017 to document services personally performed by Sondra Santizo MD, based on my observations and the provider's statements to me.   4/6/2017   [facility] EMERGENCY DEPARTMENT[DH1.1]       Sondra Santizo MD  04/06/17 1351  [JW1.3]     Revision History        User Key Date/Time User Provider Type Action    > JW1.3 4/6/2017  1:51 PM Sondra Santizo MD Physician Sign     JW1.2 4/6/2017  1:49 PM Sondra Santizo MD Physician      [N/A] 4/6/2017 12:57 PM Candido Perez Scribe Share     DH1.2 4/6/2017 12:53 PM Candido Perez Scribe Share     DH1.7 4/6/2017 12:51 PM Candido Perez Scribe      [N/A] 4/6/2017 12:50 PM Candido, Perez Scribe Share     DH1.13 4/6/2017 12:50 PM Rey, Perez Scribe Share     [N/A] 4/6/2017 12:46 PM Candido, Perez Scribe Share     [N/A] 4/6/2017 12:43 PM Rey, Perez Scribe Share     [N/A] 4/6/2017 12:42 PM Rey, Perez Scribe Share     [N/A] 4/6/2017 12:41 PM Candido, Perez Scribe Share     [N/A] 4/6/2017 12:39 PM Rey, Perez Scribe Share     DH1.14 4/6/2017 12:38 PM Rey, Perez Scribe Share     [N/A] 4/6/2017 12:31 PM Candido, Perez Scribe Share     DH1.12 4/6/2017 12:29 PM Candido, Perez Scribe Share     DH1.11 4/6/2017 12:03 PM Perez Rey Scribe Share     JW1.1 4/6/2017 11:40 AM Sondra Santizo MD Physician Share     [N/A] 4/6/2017 11:35 AM Perez Rey Share     DH1.10 4/6/2017 11:34 AM Perez Rey Share     DH1.9 4/6/2017 11:31 AM Perez Reyibvinicio Share     DH1.8 4/6/2017 11:17 AM Perez Rey Share     DH1.6 4/6/2017 10:48 AM Perez Rey Share     DH1.5 4/6/2017 10:37 AM Perez Reyibvinicio Share     DH1.4 4/6/2017 10:34 AM Perez Rey Share     DH1.3 4/6/2017 10:22 AM Perez Rey Share     DH1.1 4/6/2017 10:21 AM Perez Rey  Scribe Share            ED Notes by Karmen Patrick RN at 4/6/2017 10:19 AM     Author:  Karmen Patrick RN Service:  (none) Author Type:  Registered Nurse    Filed:  4/6/2017 10:20 AM Date of Service:  4/6/2017 10:19 AM Note Created:  4/6/2017 10:19 AM    Status:  Signed :  Karmen Patrick RN (Registered Nurse)         Patient lives in an assisted living facility, staff notified daughter when bright red blood was seen in the bathroom. History of GI bleed with hospitalization and is currently on blood thinners.[TF1.1]     Revision History        User Key Date/Time User Provider Type Action    > TF1.1 4/6/2017 10:20 AM Karmen Patrick RN Registered Nurse Sign                  Procedure Notes     No notes of this type exist for this encounter.      Progress Notes - Therapies (Notes from 04/04/17 through 04/07/17)     No notes of this type exist for this encounter.

## 2017-04-07 VITALS
WEIGHT: 253.2 LBS | HEART RATE: 65 BPM | DIASTOLIC BLOOD PRESSURE: 61 MMHG | OXYGEN SATURATION: 92 % | TEMPERATURE: 98.4 F | BODY MASS INDEX: 44.86 KG/M2 | RESPIRATION RATE: 16 BRPM | HEIGHT: 63 IN | SYSTOLIC BLOOD PRESSURE: 157 MMHG

## 2017-04-07 LAB
BASOPHILS # BLD AUTO: 0 10E9/L (ref 0–0.2)
BASOPHILS NFR BLD AUTO: 0.5 %
DIFFERENTIAL METHOD BLD: ABNORMAL
EOSINOPHIL # BLD AUTO: 0.2 10E9/L (ref 0–0.7)
EOSINOPHIL NFR BLD AUTO: 2.8 %
ERYTHROCYTE [DISTWIDTH] IN BLOOD BY AUTOMATED COUNT: 15.1 % (ref 10–15)
HCT VFR BLD AUTO: 38.9 % (ref 35–47)
HGB BLD-MCNC: 11.6 G/DL (ref 11.7–15.7)
IMM GRANULOCYTES # BLD: 0 10E9/L (ref 0–0.4)
IMM GRANULOCYTES NFR BLD: 0.3 %
INR PPP: 1.74 (ref 0.86–1.14)
LYMPHOCYTES # BLD AUTO: 1.6 10E9/L (ref 0.8–5.3)
LYMPHOCYTES NFR BLD AUTO: 26.4 %
MCH RBC QN AUTO: 26.7 PG (ref 26.5–33)
MCHC RBC AUTO-ENTMCNC: 29.8 G/DL (ref 31.5–36.5)
MCV RBC AUTO: 89 FL (ref 78–100)
MONOCYTES # BLD AUTO: 0.7 10E9/L (ref 0–1.3)
MONOCYTES NFR BLD AUTO: 10.9 %
NEUTROPHILS # BLD AUTO: 3.6 10E9/L (ref 1.6–8.3)
NEUTROPHILS NFR BLD AUTO: 59.1 %
NRBC # BLD AUTO: 0 10*3/UL
NRBC BLD AUTO-RTO: 0 /100
PLATELET # BLD AUTO: 235 10E9/L (ref 150–450)
RBC # BLD AUTO: 4.35 10E12/L (ref 3.8–5.2)
WBC # BLD AUTO: 6.1 10E9/L (ref 4–11)

## 2017-04-07 PROCEDURE — G0378 HOSPITAL OBSERVATION PER HR: HCPCS

## 2017-04-07 PROCEDURE — 85610 PROTHROMBIN TIME: CPT | Performed by: HOSPITALIST

## 2017-04-07 PROCEDURE — 96361 HYDRATE IV INFUSION ADD-ON: CPT

## 2017-04-07 PROCEDURE — 36415 COLL VENOUS BLD VENIPUNCTURE: CPT | Performed by: HOSPITALIST

## 2017-04-07 PROCEDURE — 25000128 H RX IP 250 OP 636: Performed by: PHYSICIAN ASSISTANT

## 2017-04-07 PROCEDURE — 93270 REMOTE 30 DAY ECG REV/REPORT: CPT | Performed by: HOSPITALIST

## 2017-04-07 PROCEDURE — 93272 ECG/REVIEW INTERPRET ONLY: CPT | Performed by: INTERNAL MEDICINE

## 2017-04-07 PROCEDURE — 25000125 ZZHC RX 250: Performed by: HOSPITALIST

## 2017-04-07 PROCEDURE — 85025 COMPLETE CBC W/AUTO DIFF WBC: CPT | Performed by: HOSPITALIST

## 2017-04-07 PROCEDURE — 99217 ZZC OBSERVATION CARE DISCHARGE: CPT | Performed by: PHYSICIAN ASSISTANT

## 2017-04-07 PROCEDURE — 25000132 ZZH RX MED GY IP 250 OP 250 PS 637: Mod: GY | Performed by: HOSPITALIST

## 2017-04-07 PROCEDURE — 25000132 ZZH RX MED GY IP 250 OP 250 PS 637: Mod: GY | Performed by: PHYSICIAN ASSISTANT

## 2017-04-07 PROCEDURE — A9270 NON-COVERED ITEM OR SERVICE: HCPCS | Mod: GY | Performed by: HOSPITALIST

## 2017-04-07 RX ORDER — PROCHLORPERAZINE MALEATE 5 MG
5 TABLET ORAL EVERY 6 HOURS PRN
Status: DISCONTINUED | OUTPATIENT
Start: 2017-04-07 | End: 2017-04-07 | Stop reason: HOSPADM

## 2017-04-07 RX ORDER — PROCHLORPERAZINE 25 MG
12.5 SUPPOSITORY, RECTAL RECTAL EVERY 12 HOURS PRN
Status: DISCONTINUED | OUTPATIENT
Start: 2017-04-07 | End: 2017-04-07 | Stop reason: HOSPADM

## 2017-04-07 RX ADMIN — SODIUM CHLORIDE: 9 INJECTION, SOLUTION INTRAVENOUS at 01:09

## 2017-04-07 RX ADMIN — ANASTROZOLE 1 MG: 1 TABLET, COATED ORAL at 08:51

## 2017-04-07 RX ADMIN — DILTIAZEM HYDROCHLORIDE 120 MG: 120 CAPSULE, EXTENDED RELEASE ORAL at 08:51

## 2017-04-07 RX ADMIN — LISINOPRIL 20 MG: 20 TABLET ORAL at 08:50

## 2017-04-07 RX ADMIN — HYDROCHLOROTHIAZIDE 25 MG: 25 TABLET ORAL at 08:51

## 2017-04-07 RX ADMIN — ONDANSETRON 4 MG: 4 TABLET, ORALLY DISINTEGRATING ORAL at 08:57

## 2017-04-07 RX ADMIN — CARVEDILOL 12.5 MG: 12.5 TABLET, FILM COATED ORAL at 08:51

## 2017-04-07 RX ADMIN — PROCHLORPERAZINE MALEATE 5 MG: 5 TABLET, FILM COATED ORAL at 11:47

## 2017-04-07 RX ADMIN — PANTOPRAZOLE SODIUM 40 MG: 40 TABLET, DELAYED RELEASE ORAL at 08:49

## 2017-04-07 RX ADMIN — SERTRALINE HYDROCHLORIDE 50 MG: 50 TABLET ORAL at 08:51

## 2017-04-07 NOTE — PROGRESS NOTES
Cardiac Event monitor placed. Daughter present and able to assist with monitor over the next 3 weeks. ALl questions answered.

## 2017-04-07 NOTE — PLAN OF CARE
Problem: Discharge Planning  Goal: Discharge Planning (Adult, OB, Behavioral, Peds)  Outcome: Improving  PRIMARY DIAGNOSIS: GI BLEED  OUTPATIENT/OBSERVATION GOALS TO BE MET BEFORE DISCHARGE:  1. Vital Signs stable: Yes  2. Pain status: Pain free.  3. Number of Bleeding Episodes during this shift: none   4. ADLs back to baseline? No  5. Cleared by consultants? None ordered  6. Barriers to discharge noted: No  7. Interpretation of rhythm per telemetry tech: SR BBB in 60s  A&O x 4, but has expressive aphasia from previous CVA. Denies any nausea and pain. No bloody stools this shift. Last reported stool was 0600 on 4/6/17. Will continue to monitor, assess, and offer supportive cares.

## 2017-04-07 NOTE — PLAN OF CARE
Problem: Discharge Planning  Goal: Discharge Planning (Adult, OB, Behavioral, Peds)  Outcome: Improving  Problem: Discharge Planning  Goal: Discharge Planning (Adult, OB, Behavioral, Peds)  Outcome: Improving  PRIMARY DIAGNOSIS: GI BLEED  OUTPATIENT/OBSERVATION GOALS TO BE MET BEFORE DISCHARGE:  1. Vital Signs stable: Yes  2. Pain status: Pain free.  3. Number of Bleeding Episodes during this shift: none   4. ADLs back to baseline? yes  5. Cleared by consultants? None ordered  6. Barriers to discharge noted: No  7. Interpretation of rhythm per telemetry tech: SR BBB in 60s  Alert and oriented x4. Has expressive aphasia from previous stroke. Denies pain but reports nausea, zofran given. Pt's family reports that pt has baseline nausea. No bloody stools since yesterday morning. Possible discharge this afternoon. Will continue to monitor and provide supportive cares.

## 2017-04-07 NOTE — PHARMACY-ANTICOAGULATION SERVICE
Clinical Pharmacy- Warfarin Discharge Note    Agree with No Warfarin on Discharge per MD order (see info below).    Anticoagulation Dose History     Recent Dosing and Labs Latest Ref Rng & Units 12/26/2016 12/27/2016 12/28/2016 12/29/2016 3/20/2017 4/6/2017 4/7/2017    Warfarin 5 mg - 5 mg 5 mg - - - - -    Warfarin 7.5 mg - - - 7.5 mg - - - -    INR 0.86 - 1.14 1.09 1.22(H) 1.26(H) 1.24(H) - 1.84(H) 1.74(H)    INR Point of Care 0.86 - 1.14 - - - - 1.6(H) - -          Reason for your hospital stay       Bright red blood per rectum likely due to diverticular bleeding with concomitant coumadin use. Pt was discussed with Dr. Peterson, agreed with stopping coumadin. No further bleeding noted. Hemoglobin remained stable.                 When to contact your care team      Call your primary doctor if you have any of the following: temperature greater than 101, increased shortness of breath or recurrent profuse blood in stool.                     Follow-up Appointments      Follow-up and recommended labs and tests       Follow up with Dr. Peterson as previously scheduled.   Follow up with PCP in 1-2 weeks for hospital follow up regarding GI bleed.   Wear cardiac event monitor for 3 weeks   Start baby aspirin on Sunday morning.            Discharge Medication List     Review of your medicines      START taking       Dose / Directions    aspirin 81 MG EC tablet   Used for:  CVA (cerebral vascular accident) (H)        Dose:  81 mg   Take 1 tablet (81 mg) by mouth daily   Quantity:  90 tablet   Refills:  3         CONTINUE these medicines which have NOT CHANGED       Dose / Directions    ACETAMINOPHEN PO        Dose:  1000 mg   Take 1,000 mg by mouth 3 times daily as needed   Refills:  0       ANASTROZOLE PO        Dose:  1 mg   Take 1 mg by mouth daily   Refills:  0       atorvastatin 40 MG tablet   Commonly known as:  LIPITOR        Dose:  40 mg   Take 40 mg by mouth every evening   Refills:  0       carvedilol 12.5 MG tablet    Commonly known as:  COREG   Used for:  Atrial flutter, unspecified type (H), SVT (supraventricular tachycardia) (H)        Dose:  12.5 mg   Take 1 tablet (12.5 mg) by mouth 2 times daily (with meals)   Quantity:  60 tablet   Refills:  3       diltiazem 120 MG 24 hr capsule   Used for:  Atrial flutter, unspecified type (H), SVT (supraventricular tachycardia) (H)        Dose:  120 mg   Take 1 capsule (120 mg) by mouth daily   Quantity:  30 capsule   Refills:  0       ferrous sulfate 325 (65 FE) MG tablet   Commonly known as:  IRON        Dose:  325 mg   Take 325 mg by mouth daily (with breakfast)   Refills:  0       HYDROCHLOROTHIAZIDE PO        Dose:  25 mg   Take 25 mg by mouth daily   Refills:  0       LANSOPRAZOLE PO        Dose:  30 mg   Take 30 mg by mouth every morning (before breakfast)   Refills:  0       LISINOPRIL PO        Dose:  20 mg   Take 20 mg by mouth daily   Refills:  0       MIRALAX powder   Generic drug:  polyethylene glycol        Dose:  17 g   Take 17 g by mouth 2 times daily   Refills:  0       SENEXON-S 8.6-50 MG per tablet   Generic drug:  senna-docusate        Dose:  2 tablet   Take 2 tablets by mouth 2 times daily   Refills:  0       ZOFRAN PO        Dose:  4 mg   Take 4 mg by mouth every 8 hours as needed for nausea or vomiting   Refills:  0       ZOLOFT PO        Dose:  50 mg   Take 50 mg by mouth daily   Refills:  0         STOP taking          WARFARIN SODIUM PO                Where to get your medicines      Some of these will need a paper prescription and others can be bought over the counter. Ask your nurse if you have questions.     You don't need a prescription for these medications      aspirin 81 MG EC tablet

## 2017-04-07 NOTE — PLAN OF CARE
Problem: Discharge Planning  Goal: Discharge Planning (Adult, OB, Behavioral, Peds)  Problem: Discharge Planning  Goal: Discharge Planning (Adult, OB, Behavioral, Peds)  PRIMARY DIAGNOSIS: GI BLEED  OUTPATIENT/OBSERVATION GOALS TO BE MET BEFORE DISCHARGE:  1. Vital Signs stable: Yes  2. Pain status: Pain free.  3. Number of Bleeding Episodes during this shift: none yet  4. ADLs back to baseline? No  5. Cleared by consultants? None ordered  6. Barriers to discharge noted: No  7. Interpretation of rhythm per telemetry tech: SR BBB in 60s  A&O x 4, but has expressive aphasia from previous CVA. Denies any nausea at this time.  Hgb ordered for this evening. Tolerating clear liquids. Ongoing assessment.

## 2017-04-07 NOTE — DISCHARGE SUMMARY
[facility]  Discharge Summary        Anastasiya Mota MRN# [id number]   YOB: 1929 Age: 87 year old     Date of Admission:  4/6/2017  Date of Discharge:  4/7/2017  Admitting Physician:  Steven Melo MD  Discharge Physician: Katharina Page PA-C  Discharging Service: Hospitalist      Primary Provider: [facility]  Primary Care Physician Phone Number: [phone]         Primary Discharge Diagnoses:    Anastasiya Mota was admitted on 4/6/2017 for concerns of bright red blood per rectum.     1. Acute lower GI bleed - likely diverticular bleed. Hemoglobin remained stable, VSS. No further bleeding over night. Previously on coumadin due to hx of a fib which only occurred during an illness while hospitalized. Admitting hospitalist discussed pt with Dr. Peterson, who is familiar with the patient, and agrees to stopping coumadin and switch to aspirin. Will discharge pt with cardiac event monitor for 3 weeks to assess for a fib. Start baby aspirin on Sunday.         Secondary Discharge Diagnoses:     Past Medical History:   Diagnosis Date     Acid reflux      Atrial fibrillation (H)      Breast cancer (H)      Cancer (H)      Cerebral infarction (H)      Constipation      Elevated cholesterol      Hypertension      Nausea      Paroxysmal supraventricular tachycardia (H)      SVT (supraventricular tachycardia) (H)                 Code Status:      DNR / DNI        Brief Hospital Summary:        Reason for your hospital stay       Bright red blood per rectum likely due to diverticular bleeding with   concomitant coumadin use. Pt was discussed with Dr. Peterson, agreed with   stopping coumadin. No further bleeding noted. Hemoglobin remained stable.                      Please refer to initial admission history and physical for further details.   Briefly, Anastasiya Mota was admitted on 4/6/2017 for concerns of bright red blood per rectum. Work up in ED did not  show any evidence of severe anemia or hemodynamically instability to require inpatient admission. Pt was registered to the Observation Unit for continued supportive therapy for acute lower GI bleed.     Pt was resuscitated with IV fluids and continued on supportive measures including anti-emetics and pain control as needed. Labs were reviewed and significant results addressed.  On the day of discharge, no further blood in stool was noted, hemoglobin remained stable and pt was able to tolerate PO intake. Vitals were stable, without evidence of orthostasis. Medications were reviewed and adjustments made as necessary. Pt is instructed to follow up as below.            Significant Lab During Hospitalization:        Recent Labs  Lab 04/07/17  0620 04/06/17  2150 04/06/17  1030   WBC 6.1  --  6.1   HGB 11.6* 12.0 12.4   HCT 38.9  --  41.3   MCV 89  --  88     --  270       Recent Labs  Lab 04/06/17  1030      POTASSIUM 4.6   CHLORIDE 104   CO2 31   ANIONGAP 6   GLC 96   BUN 27   CR 1.31*   GFRESTIMATED 38*   GFRESTBLACK 46*   RONALD 8.3*       Recent Labs  Lab 04/07/17  0620 04/06/17  1030   INR 1.74* 1.84*       Recent Labs  Lab 04/06/17  1030   LACT 1.0                Significant Imaging During Hospitalization:      Recent Results (from the past 48 hour(s))   CT Abdomen Pelvis w/o Contrast    Narrative    CT ABDOMEN AND PELVIS WITHOUT CONTRAST  4/6/2017 12:26 PM     INDICATION: Lower abdominal pain, hematochezia.    TECHNIQUE: Thin axial images through the abdomen and pelvis without  contrast. Coronal reformatted images. Radiation dose for this scan was  reduced using automated exposure control, adjustment of the mA and/or  kV according to patient size, or iterative reconstruction technique.    COMPARISON: None.    FINDINGS: 0.3 cm nonobstructing stone left lower kidney. No ureteral  stones or hydronephrosis. No bladder calculi. 4 cm slightly complex  cyst at the upper pole of the right kidney with some thin  peripheral  calcification. 1.2 cm high attenuation lesion at the lower pole of  left kidney is probably a hemorrhagic cyst though indeterminate. There  are additional smaller low-dense left renal lesions which are likely  cysts. Liver, gallbladder, spleen, pancreas and adrenal glands  demonstrate no worrisome findings without the benefit of contrast.  Small esophageal hiatal hernia.    Uterus is present. No suspicious pelvic masses. Colonic diverticulosis  most prominent in the descending and sigmoid colon. Degenerative  disease throughout the visualized spine with multilevel degenerative  disc disease in the lumbar spine. No destructive bone lesions.      Impression    IMPRESSION:  1. Colonic diverticulosis without diverticulitis.  2. 1.2 cm high density left renal lesion is indeterminate though most  likely a hemorrhagic/proteinaceous cyst. If there are no old  comparison studies, follow-up imaging could be performed with MRI to  confirm this.   3. Small nonobstructing left renal stone. No ureteral stones or  hydronephrosis.    BLADIMIR CRUZ MD              Pending Results:        Unresulted Labs Ordered in the Past 30 Days of this Admission     No orders found for last 61 day(s).              Consultations This Hospital Stay:      No consultations were requested during this admission         Discharge Instructions and Follow-Up:      Follow-up Appointments     Follow-up and recommended labs and tests        Follow up with Dr. Peterson as previously scheduled.  Follow up with PCP in 1-2 weeks for hospital follow up regarding GI bleed.  Wear cardiac event monitor for 3 weeks  Start baby aspirin on Sunday morning.                  Pt instructed to follow up with PCP in 7 days and with Consultant if indicated.   Follow-up Labs None        Discharge Disposition:      Discharged to home         Discharge Medications:        Current Discharge Medication List      START taking these medications    Details   aspirin 81  "MG EC tablet Take 1 tablet (81 mg) by mouth daily  Qty: 90 tablet, Refills: 3    Associated Diagnoses: CVA (cerebral vascular accident) (H)         CONTINUE these medications which have NOT CHANGED    Details   LISINOPRIL PO Take 20 mg by mouth daily      Sertraline HCl (ZOLOFT PO) Take 50 mg by mouth daily      carvedilol (COREG) 12.5 MG tablet Take 1 tablet (12.5 mg) by mouth 2 times daily (with meals)  Qty: 60 tablet, Refills: 3    Associated Diagnoses: Atrial flutter, unspecified type (H); SVT (supraventricular tachycardia) (H)      diltiazem 120 MG 24 hr capsule Take 1 capsule (120 mg) by mouth daily  Qty: 30 capsule, Refills: 0    Associated Diagnoses: Atrial flutter, unspecified type (H); SVT (supraventricular tachycardia) (H)      ferrous sulfate (IRON) 325 (65 FE) MG tablet Take 325 mg by mouth daily (with breakfast)       ANASTROZOLE PO Take 1 mg by mouth daily       HYDROCHLOROTHIAZIDE PO Take 25 mg by mouth daily      LANSOPRAZOLE PO Take 30 mg by mouth every morning (before breakfast)       polyethylene glycol (MIRALAX) powder Take 17 g by mouth 2 times daily       senna-docusate (SENEXON-S) 8.6-50 MG per tablet Take 2 tablets by mouth 2 times daily      atorvastatin (LIPITOR) 40 MG tablet Take 40 mg by mouth every evening       ACETAMINOPHEN PO Take 1,000 mg by mouth 3 times daily as needed       Ondansetron HCl (ZOFRAN PO) Take 4 mg by mouth every 8 hours as needed for nausea or vomiting         STOP taking these medications       WARFARIN SODIUM PO Comments:   Reason for Stopping:                   Allergies:       No Known Allergies        Condition and Physical on Discharge:      Discharge condition: Stable   Vitals: Blood pressure 168/74, pulse 65, temperature 98.4  F (36.9  C), temperature source Oral, resp. rate 16, height 1.6 m (5' 3\"), weight 114.9 kg (253 lb 3.2 oz), SpO2 98 %, not currently breastfeeding.  253 lbs 3.2 oz      GENERAL:  Comfortable.  PSYCH: pleasant, oriented, No acute " distress.  HEART:  RRR.  LUNGS:  Normal Respiratory effort.   EXTREMITIES:  Able to move all 4 extremities, able to ambulate with walker and standby assistance.   SKIN:  Dry to touch, No rash, wound or ulcerations.  NEUROLOGIC:  Grossly intact    Katharina Page PA-C

## 2017-04-07 NOTE — PLAN OF CARE
Problem: Discharge Planning  Goal: Discharge Planning (Adult, OB, Behavioral, Peds)  Outcome: Adequate for Discharge Date Met:  04/07/17  Jamia Chapman completed Patient's After Visit Summary was reviewed with patient and daughter  Patient verbalized understanding of After Visit Summary, recommended follow up and was given an opportunity to ask questions.   Discharge medications sent home with patient/family: nA  Discharged with daughter.     OBSERVATION patient END time: 1323

## 2017-04-20 ENCOUNTER — CARE COORDINATION (OUTPATIENT)
Dept: CARDIOLOGY | Facility: CLINIC | Age: 82
End: 2017-04-20

## 2017-04-24 ENCOUNTER — PRE VISIT (OUTPATIENT)
Dept: CARDIOLOGY | Facility: CLINIC | Age: 82
End: 2017-04-24

## 2017-04-26 ENCOUNTER — OFFICE VISIT (OUTPATIENT)
Dept: CARDIOLOGY | Facility: CLINIC | Age: 82
End: 2017-04-26
Payer: MEDICARE

## 2017-04-26 VITALS
DIASTOLIC BLOOD PRESSURE: 66 MMHG | BODY MASS INDEX: 43.04 KG/M2 | SYSTOLIC BLOOD PRESSURE: 140 MMHG | WEIGHT: 252.1 LBS | HEART RATE: 68 BPM | HEIGHT: 64 IN

## 2017-04-26 DIAGNOSIS — I47.10 SVT (SUPRAVENTRICULAR TACHYCARDIA) (H): Primary | ICD-10-CM

## 2017-04-26 PROCEDURE — 93000 ELECTROCARDIOGRAM COMPLETE: CPT | Performed by: NURSE PRACTITIONER

## 2017-04-26 PROCEDURE — 99214 OFFICE O/P EST MOD 30 MIN: CPT | Mod: 25 | Performed by: NURSE PRACTITIONER

## 2017-04-26 NOTE — PROGRESS NOTES
HPI and Plan:   See dictation    Orders Placed This Encounter   Procedures     EKG 12-lead complete w/read - Clinics (performed today)       No orders of the defined types were placed in this encounter.      There are no discontinued medications.      Encounter Diagnosis   Name Primary?     Atrial fibrillation (H) Yes       CURRENT MEDICATIONS:  Current Outpatient Prescriptions   Medication Sig Dispense Refill     aspirin 81 MG EC tablet Take 1 tablet (81 mg) by mouth daily 90 tablet 3     Ondansetron HCl (ZOFRAN PO) Take 4 mg by mouth every 8 hours as needed for nausea or vomiting       LISINOPRIL PO Take 20 mg by mouth daily       Sertraline HCl (ZOLOFT PO) Take 50 mg by mouth daily       carvedilol (COREG) 12.5 MG tablet Take 1 tablet (12.5 mg) by mouth 2 times daily (with meals) 60 tablet 3     diltiazem 120 MG 24 hr capsule Take 1 capsule (120 mg) by mouth daily 30 capsule 0     ferrous sulfate (IRON) 325 (65 FE) MG tablet Take 325 mg by mouth daily (with breakfast)        ANASTROZOLE PO Take 1 mg by mouth daily        HYDROCHLOROTHIAZIDE PO Take 25 mg by mouth daily       LANSOPRAZOLE PO Take 30 mg by mouth every morning (before breakfast)        polyethylene glycol (MIRALAX) powder Take 17 g by mouth 2 times daily        senna-docusate (SENEXON-S) 8.6-50 MG per tablet Take 2 tablets by mouth 2 times daily       atorvastatin (LIPITOR) 40 MG tablet Take 40 mg by mouth every evening        ACETAMINOPHEN PO Take 1,000 mg by mouth 3 times daily as needed          ALLERGIES   No Known Allergies    PAST MEDICAL HISTORY:  Past Medical History:   Diagnosis Date     Acid reflux      Atrial fibrillation (H)      Breast cancer (H)      Cancer (H)      Cerebral infarction (H)      Constipation      Elevated cholesterol      Hypertension      Nausea      Paroxysmal supraventricular tachycardia (H)      SVT (supraventricular tachycardia) (H)        PAST SURGICAL HISTORY:  No past surgical history on file.    FAMILY  "HISTORY:  Family History   Problem Relation Age of Onset     Unknown/Adopted No family hx of        SOCIAL HISTORY:  Social History     Social History     Marital status:      Spouse name: N/A     Number of children: N/A     Years of education: N/A     Social History Main Topics     Smoking status: Never Smoker     Smokeless tobacco: Not on file     Alcohol use No     Drug use: Not on file     Sexual activity: Not on file     Other Topics Concern     Caffeine Concern No     Special Diet No     regular diet      Exercise No     Social History Narrative       Review of Systems:  Skin:          Eyes:         ENT:         Respiratory:          Cardiovascular:         Gastroenterology:        Genitourinary:         Musculoskeletal:         Neurologic:         Psychiatric:         Heme/Lymph/Imm:         Endocrine:           Physical Exam:  Vitals: /66 (BP Location: Right arm, Patient Position: Chair, Cuff Size: Adult Large)  Pulse 68  Ht 1.626 m (5' 4\")  Wt 114.4 kg (252 lb 1.6 oz)  BMI 43.27 kg/m2    Constitutional:           Skin:           Head:           Eyes:           ENT:           Neck:           Chest:             Cardiac:                    Abdomen:           Vascular:                                          Extremities and Back:                 Neurological:                 CC  No referring provider defined for this encounter.              "

## 2017-04-26 NOTE — LETTER
4/26/2017    [facility]  [id number] [address] [address]    RE: Anastasiya Sagastume Svetlana       Dear Colleague,    I had the pleasure of seeing Anastasiya Mota in the [facility].    Ms. Mota is a delightful, 87-year-old patient of Dr. Peterson presenting in clinic today for a followup visit regarding SVT and atrial flutter.  She had a prolonged hospitalization at [facility] in 12/2015 due to a lower GI bleed complicated by a non-STEMI, acute renal insufficiency, ICU delirium, stroke and episodes of tachycardia.  EP Service saw her, and they diagnosed her with SVT and atrial flutter.      She was admitted to [facility] in Christmas of 2016.  She was found to be tachycardic and was placed on diltiazem.  She was then started on amiodarone 200 mg twice daily, which transitioned down to 200 mg daily.      She then had an outpatient consultation with Dr. Peterson on 02/08/2017.  At that visit, they decreased the amiodarone to 5 days per week, increased the carvedilol due to hypertension and planned for SVT ablation.      Her amiodarone was stopped at the end of February.  She underwent SVT ablation on 03/20 successfully.  All other meds, including anticoagulation, were resumed, but the amiodarone was discontinued completely.  She then had an event monitor placed.      She is here today for followup of the monitor and SVT ablation.  Her daughter is with her.  She is in assisted living.  They tell me she is doing well and has had no episodes of palpitations, denying chest pain or breathlessness.  No complications they have noticed from the procedure.  Her blood pressures have been well controlled at the facility, and they do have p.r.n. orders for her medicines to be given if the blood pressure is elevated.      In clinic today, blood pressure is 140/66.  Heart rate is 68 and regular.  EKG shows sinus rhythm with a right bundle branch block, which is per her norm.       PHYSICAL EXAMINATION:   GENERAL:  Well-appearing elderly female in no acute distress.   HEENT:  Normocephalic, atraumatic.   LUNGS:  Clear bilaterally.   CARDIAC:  S1, S2, with a regular rate and rhythm.   ABDOMEN:  Obese, but soft.   LOWER EXTREMITIES:  Trace edema.     Outpatient Encounter Prescriptions as of 4/26/2017   Medication Sig Dispense Refill     aspirin 81 MG EC tablet Take 1 tablet (81 mg) by mouth daily 90 tablet 3     Ondansetron HCl (ZOFRAN PO) Take 4 mg by mouth every 8 hours as needed for nausea or vomiting       LISINOPRIL PO Take 20 mg by mouth daily       Sertraline HCl (ZOLOFT PO) Take 50 mg by mouth daily       carvedilol (COREG) 12.5 MG tablet Take 1 tablet (12.5 mg) by mouth 2 times daily (with meals) 60 tablet 3     diltiazem 120 MG 24 hr capsule Take 1 capsule (120 mg) by mouth daily 30 capsule 0     ferrous sulfate (IRON) 325 (65 FE) MG tablet Take 325 mg by mouth daily (with breakfast)        ANASTROZOLE PO Take 1 mg by mouth daily        HYDROCHLOROTHIAZIDE PO Take 25 mg by mouth daily       LANSOPRAZOLE PO Take 30 mg by mouth every morning (before breakfast)        polyethylene glycol (MIRALAX) powder Take 17 g by mouth 2 times daily        senna-docusate (SENEXON-S) 8.6-50 MG per tablet Take 2 tablets by mouth 2 times daily       atorvastatin (LIPITOR) 40 MG tablet Take 40 mg by mouth every evening        ACETAMINOPHEN PO Take 1,000 mg by mouth 3 times daily as needed        No facility-administered encounter medications on file as of 4/26/2017.       IMPRESSION AND PLAN:  Ms. Mota is a delightful, 87-year-old female with a history of paroxysmal supraventricular tachycardia and atrial flutter, now status post treatment with amiodarone, which has been discontinued in February, and status post SVT ablation, which was successful for her.  Event monitoring has shown no paroxysmal atrial flutter.  Anticoagulation had to be stopped because of GI bleeding.  She is now on aspirin.   She will continue the calcium channel blocker and beta blocker.  She has had difficult-to-control hypertension before, but it seems to be under good control now and is being followed closely at her care facility.      It was my pleasure participating in the care of Ms. Mota in clinic today.  We would certainly be happy to see her on a p.r.n. basis.      Sincerely,    Bella Chavira, ALMA, APRN CNP

## 2017-04-26 NOTE — MR AVS SNAPSHOT
"              After Visit Summary   2017    Anastasiya Mota    MRN: [id number]           Patient Information     Date Of Birth          1929        Visit Information        Provider Department      2017 1:10 PM Bella Chavira APRN CNP [facility]        Today's Diagnoses     SVT (supraventricular tachycardia) (H)    -  1       Follow-ups after your visit        Who to contact     If you have questions or need follow up information about today's clinic visit or your schedule please contact [facility] directly at [phone].  Normal or non-critical lab and imaging results will be communicated to you by [employer], letter or phone within 4 business days after the clinic has received the results. If you do not hear from us within 7 days, please contact the clinic through [employer] or phone. If you have a critical or abnormal lab result, we will notify you by phone as soon as possible.  Submit refill requests through [employer] or call your pharmacy and they will forward the refill request to us. Please allow 3 business days for your refill to be completed.          Additional Information About Your Visit        MyChart Information     [employer] lets you send messages to your doctor, view your test results, renew your prescriptions, schedule appointments and more. To sign up, go to [email or web address] . Click on \"Log in\" on the left side of the screen, which will take you to the Welcome page. Then click on \"Sign up Now\" on the right side of the page.     You will be asked to enter the access code listed below, as well as some personal information. Please follow the directions to create your username and password.     Your access code is: [email or web address]  Expires: 2017 10:51 AM     Your access code will  in 90 days. If you need help or a new code, please call your [facility] clinic or [phone].        Care " "EveryWhere ID     This is your Care EveryWhere ID. This could be used by other organizations to access your [facility] medical records  [id number]        Your Vitals Were     Pulse Height BMI (Body Mass Index)             68 1.626 m (5' 4\") 43.27 kg/m2          Blood Pressure from Last 3 Encounters:   04/26/17 140/66   04/07/17 157/61   03/20/17 145/90    Weight from Last 3 Encounters:   04/26/17 114.4 kg (252 lb 1.6 oz)   04/06/17 114.9 kg (253 lb 3.2 oz)   03/20/17 114.3 kg (252 lb)              We Performed the Following     EKG 12-lead complete w/read - Clinics (performed today)        Primary Care Provider Office Phone # Fax #    [facility] [phone] [address] [address]        Thank you!     Thank you for choosing [facility] [place]  for your care. Our goal is always to provide you with excellent care. Hearing back from our patients is one way we can continue to improve our services. Please take a few minutes to complete the written survey that you may receive in the mail after your visit with us. Thank you!             Your Updated Medication List - Protect others around you: Learn how to safely use, store and throw away your medicines at www.disposemymeds.org.          This list is accurate as of: 4/26/17  3:25 PM.  Always use your most recent med list.                   Brand Name Dispense Instructions for use    ACETAMINOPHEN PO      Take 1,000 mg by mouth 3 times daily as needed       ANASTROZOLE PO      Take 1 mg by mouth daily       aspirin 81 MG EC tablet     90 tablet    Take 1 tablet (81 mg) by mouth daily       atorvastatin 40 MG tablet    LIPITOR     Take 40 mg by mouth every evening       carvedilol 12.5 MG tablet    COREG    60 tablet    Take 1 tablet (12.5 mg) by mouth 2 times daily (with meals)       diltiazem 120 MG 24 hr capsule     30 capsule    Take 1 capsule (120 mg) by mouth daily       " ferrous sulfate 325 (65 FE) MG tablet    IRON     Take 325 mg by mouth daily (with breakfast)       HYDROCHLOROTHIAZIDE PO      Take 25 mg by mouth daily       LANSOPRAZOLE PO      Take 30 mg by mouth every morning (before breakfast)       LISINOPRIL PO      Take 20 mg by mouth daily       MIRALAX powder   Generic drug:  polyethylene glycol      Take 17 g by mouth 2 times daily       SENEXON-S 8.6-50 MG per tablet   Generic drug:  senna-docusate      Take 2 tablets by mouth 2 times daily       ZOFRAN PO      Take 4 mg by mouth every 8 hours as needed for nausea or vomiting       ZOLOFT PO      Take 50 mg by mouth daily

## 2017-04-27 NOTE — PROGRESS NOTES
HISTORY OF PRESENT ILLNESS:  Ms. Mota is a delightful, 87-year-old patient of Dr. Peterson presenting in clinic today for a followup visit regarding SVT and atrial flutter.  She had a prolonged hospitalization at [place] [facility] in 12/2015 due to a lower GI bleed complicated by a non-STEMI, acute renal insufficiency, ICU delirium, stroke and episodes of tachycardia.  EP Service saw her, and they diagnosed her with SVT and atrial flutter.      She was admitted to [facility] in [place] of 2016.  She was found to be tachycardic and was placed on diltiazem.  She was then started on amiodarone 200 mg twice daily, which transitioned down to 200 mg daily.      She then had an outpatient consultation with Dr. Peterson on 02/08/2017.  At that visit, they decreased the amiodarone to 5 days per week, increased the carvedilol due to hypertension and planned for SVT ablation.      Her amiodarone was stopped at the end of February.  She underwent SVT ablation on 03/20 successfully.  All other meds, including anticoagulation, were resumed, but the amiodarone was discontinued completely.  She then had an event monitor placed.      She is here today for followup of the monitor and SVT ablation.  Her daughter is with her.  She is in assisted living.  They tell me she is doing well and has had no episodes of palpitations, denying chest pain or breathlessness.  No complications they have noticed from the procedure.  Her blood pressures have been well controlled at the facility, and they do have p.r.n. orders for her medicines to be given if the blood pressure is elevated.      In clinic today, blood pressure is 140/66.  Heart rate is 68 and regular.  EKG shows sinus rhythm with a right bundle branch block, which is per her norm.      PHYSICAL EXAMINATION:   GENERAL:  Well-appearing elderly female in no acute distress.   HEENT:  Normocephalic, atraumatic.   LUNGS:  Clear bilaterally.   CARDIAC:  S1, S2, with a regular rate and rhythm.    ABDOMEN:  Obese, but soft.   LOWER EXTREMITIES:  Trace edema.      IMPRESSION AND PLAN:  Ms. Lockett is a delightful, 87-year-old female with a history of paroxysmal supraventricular tachycardia and atrial flutter, now status post treatment with amiodarone, which has been discontinued in February, and status post SVT ablation, which was successful for her.  Event monitoring has shown no paroxysmal atrial flutter.  Anticoagulation had to be stopped because of GI bleeding.  She is now on aspirin.  She will continue the calcium channel blocker and beta blocker.  She has had difficult-to-control hypertension before, but it seems to be under good control now and is being followed closely at her care facility.      It was my pleasure participating in the care of Ms. Lockett in clinic today.  We would certainly be happy to see her on a p.r.n. basis.         VEENA HENDRICKSON, CNP             D: 2017 15:21   T: 2017 11:52   MT: helena      Name:     MG LOCKETT   MRN:      [id number]        Account:      [id number]   :      1929           Service Date: 2017      Document: [id number]

## 2017-09-11 ENCOUNTER — RECORDS - HEALTHEAST (OUTPATIENT)
Dept: LAB | Facility: CLINIC | Age: 82
End: 2017-09-11

## 2017-09-11 LAB — HBA1C MFR BLD: 5.9 % (ref 4.2–6.1)

## 2017-10-16 ENCOUNTER — TRANSFERRED RECORDS (OUTPATIENT)
Dept: HEALTH INFORMATION MANAGEMENT | Facility: CLINIC | Age: 82
End: 2017-10-16

## 2017-10-17 ENCOUNTER — MEDICAL CORRESPONDENCE (OUTPATIENT)
Dept: HEALTH INFORMATION MANAGEMENT | Facility: CLINIC | Age: 82
End: 2017-10-17

## 2017-10-19 ENCOUNTER — OFFICE VISIT (OUTPATIENT)
Dept: CARDIOLOGY | Facility: CLINIC | Age: 82
End: 2017-10-19
Attending: INTERNAL MEDICINE
Payer: MEDICARE

## 2017-10-19 VITALS
DIASTOLIC BLOOD PRESSURE: 58 MMHG | BODY MASS INDEX: 42.17 KG/M2 | SYSTOLIC BLOOD PRESSURE: 132 MMHG | WEIGHT: 247 LBS | HEART RATE: 60 BPM | HEIGHT: 64 IN

## 2017-10-19 DIAGNOSIS — I47.10 PAROXYSMAL SUPRAVENTRICULAR TACHYCARDIA (H): ICD-10-CM

## 2017-10-19 DIAGNOSIS — I10 BENIGN ESSENTIAL HYPERTENSION: Primary | ICD-10-CM

## 2017-10-19 DIAGNOSIS — R60.0 LOWER EXTREMITY EDEMA: ICD-10-CM

## 2017-10-19 PROCEDURE — 99214 OFFICE O/P EST MOD 30 MIN: CPT | Performed by: PHYSICIAN ASSISTANT

## 2017-10-19 PROCEDURE — 93000 ELECTROCARDIOGRAM COMPLETE: CPT | Performed by: PHYSICIAN ASSISTANT

## 2017-10-19 RX ORDER — SPIRONOLACTONE 25 MG/1
12.5 TABLET ORAL DAILY
Qty: 30 TABLET | Refills: 3 | Status: SHIPPED | OUTPATIENT
Start: 2017-10-19 | End: 2017-12-15

## 2017-10-19 RX ORDER — CLONIDINE HYDROCHLORIDE 0.3 MG/1
0.3 TABLET ORAL 2 TIMES DAILY PRN
COMMUNITY

## 2017-10-19 RX ORDER — LANOLIN ALCOHOL/MO/W.PET/CERES
3 CREAM (GRAM) TOPICAL
COMMUNITY

## 2017-10-19 NOTE — PATIENT INSTRUCTIONS
Visit Summary:    Today we discussed:   You have some extra fluid so we are going to add a pill that should help.   Recheck labs in 7-10 days at your assisted living facility if possible.    Medication changes:    Add spironolactone (Aldactone) 12.5mg daily.    Test results:   Your ECG showed that you are still in sinus rhythm.     Follow up:   With our clinic in 2 weeks.  We will also arrange for you to get a general cardiologist (Dr. Barajas) in a few months.      Please call my nurse Zari at [phone] with any questions or concerns. Scheduling phone number: [phone] if needed.

## 2017-10-19 NOTE — LETTER
10/19/2017    [facility]  [address] [address] [address]    RE: Anastasiya Billte       Dear Colleague,    I had the pleasure of seeing Ansatasiya Mota in the [facility].      Cardiology Progress Note    Date of Service: 10/19/2017      Patient seen today in follow up of: hypertension, and history of SVT/aflutter    Primary cardiologist: Seen previously by EP only--Dr. Peterson    HPI:  Ms. Mota is a pleasant 88 year old female with a PMHx of pSVT and aflutter, dating back to 2015 after a prolonged hospitalization at [place] for a GI bleed, at which time she had a NSTEMI, ASAD, and developed tachycardia, felt to be SVT/aflutter. She was then admitted at [facility] in December 2016, noted to be tachycardic once again, placed on diltiazem, and subsequently amiodarone after being seen by EP. In February of 2017 her amio was discontinued, and she underwent a successful SVT ablation in March 2017. She was seen back in follow up by EP in April, and was doing well, with no further tachyarrythmias on event monitor. Her AC was ultimately stopped once again due to GI bleed concerns and she is now only on an ASA. She was then to be followed on a PRN basis.    I am seeing her today in follow up at the request of her daughter, as there have been reports of hypertension at her assisted living facility. Her log today shows BP ranging generally from 153-170 systolic and diastolics in the 70s. They have uptitrated some of her antihypertensives, but recommended she return to see cardiology for further recommendations. She is currently on Lisinopril 30mg daily, Coreg 25mg BID, HCTZ 25mg daily, and clonidine which is PRN, but the daughter is unsure if she has been receiving this. Of note, she remains on diltiazem as well. Ms. Mota states that she's getting a bit more short winded while getting in and out of bed and ambulating with her rolling walker as well. She denies  CP, palpitations, dizziness, or orthopnea. She is noted to have some mild LE edema today, which the daughter states she hadn't noticed, and is new at least over the last few weeks. Of note, her last echo was in Dec 2016, and at that time her EF was 60-65% with no regional wall motion abnormalities. No significant valvular abnormalities noted.       ASSESSMENT/PLAN:    1.  Hypertension.   --Reportedly somewhat difficult to control in the past as well. Of note, while her SBPs are reported 150-170s at her assisted living facility, its acceptable today at 132/58.     --We will continue with her ACE, BB and HCTZ without change.    --Given evidence of new volume retention on exam, begin spironolactone 12.5mg daily. Have requested that her assisted living facility check a BMP in 7-10 days and forward to our office (K was 3.7 on outside labs done earlier this week, and SCr 1.15--see below).    --Will consider repeat echo if no improvement.     2.  Hx of SVT/aflutter, s/p ablation.    --ECG today with sinus bradycardia, and clinically do not suspect recurrence of tachyarrythmia.    --Chronically remains on diltiazem, no change today; can revisit if continues to have issues with volume retention.    --Given hx of GIB she remains off full AC, and on ASA only.      Follow up plan: in 2 weeks with [facility] in [place].   Will also request new formal general cardiology visit with Dr. Barajas (as this case was discussed with him in clinic today)  in ~2months.       Orders this Visit:  Orders Placed This Encounter   Procedures     Basic metabolic panel     Follow-Up with Cardiac Advanced Practice Provider     Follow-Up with Cardiologist     EKG 12-lead complete w/read - Clinics     Orders Placed This Encounter   Medications     cloNIDine (CATAPRES) 0.1 MG tablet     Sig: Take 0.1 mg by mouth 2 times daily as needed     melatonin 3 MG tablet     Sig: Take 3 mg by mouth nightly as needed for sleep     guaiFENesin (ROBAFEN) 100 MG/5ML SYRP      Sig: Take 10 mLs by mouth every 6 hours as needed for cough     spironolactone (ALDACTONE) 25 MG tablet     Sig: Take 0.5 tablets (12.5 mg) by mouth daily     Dispense:  30 tablet     Refill:  3     Medications Discontinued During This Encounter   Medication Reason     polyethylene glycol (MIRALAX) powder Discontinued by another Health Care Provider         CURRENT MEDICATIONS:  Current Outpatient Prescriptions   Medication Sig Dispense Refill     cloNIDine (CATAPRES) 0.1 MG tablet Take 0.1 mg by mouth 2 times daily as needed       melatonin 3 MG tablet Take 3 mg by mouth nightly as needed for sleep       guaiFENesin (ROBAFEN) 100 MG/5ML SYRP Take 10 mLs by mouth every 6 hours as needed for cough       spironolactone (ALDACTONE) 25 MG tablet Take 0.5 tablets (12.5 mg) by mouth daily 30 tablet 3     aspirin 81 MG EC tablet Take 1 tablet (81 mg) by mouth daily 90 tablet 3     Ondansetron HCl (ZOFRAN PO) Take 4 mg by mouth every 8 hours as needed for nausea or vomiting       LISINOPRIL PO Take 30 mg by mouth daily        Sertraline HCl (ZOLOFT PO) Take 50 mg by mouth daily       carvedilol (COREG) 12.5 MG tablet Take 25 mg by mouth 2 times daily (with meals)  60 tablet 3     diltiazem 120 MG 24 hr capsule Take 1 capsule (120 mg) by mouth daily (Patient taking differently: Take 240 mg by mouth daily ) 30 capsule 0     ferrous sulfate (IRON) 325 (65 FE) MG tablet Take 325 mg by mouth daily (with breakfast)        ANASTROZOLE PO Take 1 mg by mouth daily        HYDROCHLOROTHIAZIDE PO Take 25 mg by mouth daily       LANSOPRAZOLE PO Take 30 mg by mouth every morning (before breakfast)        senna-docusate (SENEXON-S) 8.6-50 MG per tablet Take 2 tablets by mouth 2 times daily       atorvastatin (LIPITOR) 40 MG tablet Take 40 mg by mouth every evening        ACETAMINOPHEN PO Take 1,000 mg by mouth 3 times daily as needed          ALLERGIES   No Known Allergies    PAST MEDICAL HISTORY:  Past Medical History:   Diagnosis  "Date     Acid reflux      Atrial fibrillation (H)      Breast cancer (H)      Cancer (H)      Cerebral infarction (H)      Constipation      Elevated cholesterol      Hypertension      Nausea      Paroxysmal supraventricular tachycardia (H)      SVT (supraventricular tachycardia) (H)        PAST SURGICAL HISTORY:  No past surgical history on file.    FAMILY HISTORY:  Family History   Problem Relation Age of Onset     Unknown/Adopted No family hx of        SOCIAL HISTORY:  Social History     Social History     Marital status:      Spouse name: N/A     Number of children: N/A     Years of education: N/A     Social History Main Topics     Smoking status: Never Smoker     Smokeless tobacco: Never Used     Alcohol use No     Drug use: None     Sexual activity: Not Asked     Other Topics Concern     Caffeine Concern No     Special Diet No     regular diet      Exercise No     Social History Narrative       Review of Systems:  Cardiovascular: negative for chest pain, palpitations, orthopnea, PND, pos for LE edema  Constitutional: negative for chills, sweats, fever, unintentional weight loss. Pos for weight gain.  Resp: Negative for dyspnea at rest, pos for dyspnea on exertion, neg for productive cough, wheezing, hemoptysis.   HEENT: Negative for visual changes, sinus congestion  Gastrointestinal: negative for abdominal pain, constipation, diarrhea, blood in stool, nausea, vomiting  Hematologic/lymphatic: negative for current blood thinners, hx of blood clots  Musculoskeletal: negative for new back pain, falls, neck pain  Neurological: negative for focal weakness, LOC, seizures      Physical Exam:  Vitals: /58 (BP Location: Right arm, Patient Position: Sitting, Cuff Size: Adult Large)  Pulse 60  Ht 1.626 m (5' 4\")  Wt 112 kg (247 lb)  BMI 42.4 kg/m2   Wt Readings from Last 4 Encounters:   10/19/17 112 kg (247 lb)   04/26/17 114.4 kg (252 lb 1.6 oz)   04/06/17 114.9 kg (253 lb 3.2 oz)   03/20/17 114.3 kg " (252 lb)       GEN:  In general, this is an overweight AAF in no acute distress on RA. She is a poor historian and gets anxious somewhat easily during our visit. Patient ambulatory with use of rolling walker.   HEENT:  Pupils equal, round. Sclerae nonicteric. Clear oropharynx. Mucous membranes moist.  NECK: Supple, no masses appreciated. Trachea midline. Unable to appreciate JVD, sitting upright.   C/V:  Regular rate and rhythm, with intermittent extra beat. No murmur, rub or gallop. No S3 or RV heave.   RESP: Respirations are unlabored. No use of accessory muscles. Few faint crackles in bases posteriorly, but no wheezing or rhonchi. Otherwise clear.   GI: Abdomen soft, nontender, nondistended.   EXTREM: 1+ bilateral pretibial LE edema. No cyanosis or clubbing.  NEURO: Alert and oriented, cooperative. Gait slow, using rolling walker. No obvious focal deficits.  SKIN: Warm and dry. No rashes or petechiae appreciated.       Recent Lab Results:    Outside labs: [facility]    10/16/17  Na 140  K 3.7  Chl 104  C02 28  Glu 97  Ca 9.3  BUN 13  SCr 1.15     New imaging:  None    Nhung Smith PA-C  [facility]  Pager [phone]    Thank you for allowing me to participate in the care of your patient.    Sincerely,     CRISTHIAN Bullock     [facility]

## 2017-10-19 NOTE — PROGRESS NOTES
Cardiology Progress Note    Date of Service: 10/19/2017      Patient seen today in follow up of: hypertension, and history of SVT/aflutter    Primary cardiologist: Seen previously by EP only--Dr. Peterson    HPI:  Ms. Mota is a pleasant 88 year old female with a PMHx of pSVT and aflutter, dating back to 2015 after a prolonged hospitalization at [facility] for a GI bleed, at which time she had a NSTEMI, ASAD, and developed tachycardia, felt to be SVT/aflutter. She was then admitted at [facility] in December 2016, noted to be tachycardic once again, placed on diltiazem, and subsequently amiodarone after being seen by EP. In February of 2017 her amio was discontinued, and she underwent a successful SVT ablation in March 2017. She was seen back in follow up by EP in April, and was doing well, with no further tachyarrythmias on event monitor. Her AC was ultimately stopped once again due to GI bleed concerns and she is now only on an ASA. She was then to be followed on a PRN basis.    I am seeing her today in follow up at the request of her daughter, as there have been reports of hypertension at her assisted living facility. Her log today shows BP ranging generally from 153-170 systolic and diastolics in the 70s. They have uptitrated some of her antihypertensives, but recommended she return to see cardiology for further recommendations. She is currently on Lisinopril 30mg daily, Coreg 25mg BID, HCTZ 25mg daily, and clonidine which is PRN, but the daughter is unsure if she has been receiving this. Of note, she remains on diltiazem as well. Ms. Mota states that she's getting a bit more short winded while getting in and out of bed and ambulating with her rolling walker as well. She denies CP, palpitations, dizziness, or orthopnea. She is noted to have some mild LE edema today, which the daughter states she hadn't noticed, and is new at least over the last few weeks. Of note, her last echo was in Dec 2016, and at that time her  EF was 60-65% with no regional wall motion abnormalities. No significant valvular abnormalities noted.       ASSESSMENT/PLAN:    1.  Hypertension.   --Reportedly somewhat difficult to control in the past as well. Of note, while her SBPs are reported 150-170s at her assisted living facility, its acceptable today at 132/58.     --We will continue with her ACE, BB and HCTZ without change.    --Given evidence of new volume retention on exam, begin spironolactone 12.5mg daily. Have requested that her assisted living facility check a BMP in 7-10 days and forward to our office (K was 3.7 on outside labs done earlier this week, and SCr 1.15--see below).    --Will consider repeat echo if no improvement.     2.  Hx of SVT/aflutter, s/p ablation.    --ECG today with sinus bradycardia, and clinically do not suspect recurrence of tachyarrythmia.    --Chronically remains on diltiazem, no change today; can revisit if continues to have issues with volume retention.    --Given hx of GIB she remains off full AC, and on ASA only.      Follow up plan: in 2 weeks with [facility] in [place].   Will also request new formal general cardiology visit with Dr. Barajas (as this case was discussed with him in clinic today)  in ~2months.       Orders this Visit:  Orders Placed This Encounter   Procedures     Basic metabolic panel     Follow-Up with Cardiac Advanced Practice Provider     Follow-Up with Cardiologist     EKG 12-lead complete w/read - Clinics     Orders Placed This Encounter   Medications     cloNIDine (CATAPRES) 0.1 MG tablet     Sig: Take 0.1 mg by mouth 2 times daily as needed     melatonin 3 MG tablet     Sig: Take 3 mg by mouth nightly as needed for sleep     guaiFENesin (ROBAFEN) 100 MG/5ML SYRP     Sig: Take 10 mLs by mouth every 6 hours as needed for cough     spironolactone (ALDACTONE) 25 MG tablet     Sig: Take 0.5 tablets (12.5 mg) by mouth daily     Dispense:  30 tablet     Refill:  3     Medications Discontinued During This  Encounter   Medication Reason     polyethylene glycol (MIRALAX) powder Discontinued by another Health Care Provider         CURRENT MEDICATIONS:  Current Outpatient Prescriptions   Medication Sig Dispense Refill     cloNIDine (CATAPRES) 0.1 MG tablet Take 0.1 mg by mouth 2 times daily as needed       melatonin 3 MG tablet Take 3 mg by mouth nightly as needed for sleep       guaiFENesin (ROBAFEN) 100 MG/5ML SYRP Take 10 mLs by mouth every 6 hours as needed for cough       spironolactone (ALDACTONE) 25 MG tablet Take 0.5 tablets (12.5 mg) by mouth daily 30 tablet 3     aspirin 81 MG EC tablet Take 1 tablet (81 mg) by mouth daily 90 tablet 3     Ondansetron HCl (ZOFRAN PO) Take 4 mg by mouth every 8 hours as needed for nausea or vomiting       LISINOPRIL PO Take 30 mg by mouth daily        Sertraline HCl (ZOLOFT PO) Take 50 mg by mouth daily       carvedilol (COREG) 12.5 MG tablet Take 25 mg by mouth 2 times daily (with meals)  60 tablet 3     diltiazem 120 MG 24 hr capsule Take 1 capsule (120 mg) by mouth daily (Patient taking differently: Take 240 mg by mouth daily ) 30 capsule 0     ferrous sulfate (IRON) 325 (65 FE) MG tablet Take 325 mg by mouth daily (with breakfast)        ANASTROZOLE PO Take 1 mg by mouth daily        HYDROCHLOROTHIAZIDE PO Take 25 mg by mouth daily       LANSOPRAZOLE PO Take 30 mg by mouth every morning (before breakfast)        senna-docusate (SENEXON-S) 8.6-50 MG per tablet Take 2 tablets by mouth 2 times daily       atorvastatin (LIPITOR) 40 MG tablet Take 40 mg by mouth every evening        ACETAMINOPHEN PO Take 1,000 mg by mouth 3 times daily as needed          ALLERGIES   No Known Allergies    PAST MEDICAL HISTORY:  Past Medical History:   Diagnosis Date     Acid reflux      Atrial fibrillation (H)      Breast cancer (H)      Cancer (H)      Cerebral infarction (H)      Constipation      Elevated cholesterol      Hypertension      Nausea      Paroxysmal supraventricular tachycardia (H)   "    SVT (supraventricular tachycardia) (H)        PAST SURGICAL HISTORY:  No past surgical history on file.    FAMILY HISTORY:  Family History   Problem Relation Age of Onset     Unknown/Adopted No family hx of        SOCIAL HISTORY:  Social History     Social History     Marital status:      Spouse name: N/A     Number of children: N/A     Years of education: N/A     Social History Main Topics     Smoking status: Never Smoker     Smokeless tobacco: Never Used     Alcohol use No     Drug use: None     Sexual activity: Not Asked     Other Topics Concern     Caffeine Concern No     Special Diet No     regular diet      Exercise No     Social History Narrative       Review of Systems:  Cardiovascular: negative for chest pain, palpitations, orthopnea, PND, pos for LE edema  Constitutional: negative for chills, sweats, fever, unintentional weight loss. Pos for weight gain.  Resp: Negative for dyspnea at rest, pos for dyspnea on exertion, neg for productive cough, wheezing, hemoptysis.   HEENT: Negative for visual changes, sinus congestion  Gastrointestinal: negative for abdominal pain, constipation, diarrhea, blood in stool, nausea, vomiting  Hematologic/lymphatic: negative for current blood thinners, hx of blood clots  Musculoskeletal: negative for new back pain, falls, neck pain  Neurological: negative for focal weakness, LOC, seizures      Physical Exam:  Vitals: /58 (BP Location: Right arm, Patient Position: Sitting, Cuff Size: Adult Large)  Pulse 60  Ht 1.626 m (5' 4\")  Wt 112 kg (247 lb)  BMI 42.4 kg/m2   Wt Readings from Last 4 Encounters:   10/19/17 112 kg (247 lb)   04/26/17 114.4 kg (252 lb 1.6 oz)   04/06/17 114.9 kg (253 lb 3.2 oz)   03/20/17 114.3 kg (252 lb)       GEN:  In general, this is an overweight AAF in no acute distress on RA. She is a poor historian and gets anxious somewhat easily during our visit. Patient ambulatory with use of rolling walker.   HEENT:  Pupils equal, round. " Sclerae nonicteric. Clear oropharynx. Mucous membranes moist.  NECK: Supple, no masses appreciated. Trachea midline. Unable to appreciate JVD, sitting upright.   C/V:  Regular rate and rhythm, with intermittent extra beat. No murmur, rub or gallop. No S3 or RV heave.   RESP: Respirations are unlabored. No use of accessory muscles. Few faint crackles in bases posteriorly, but no wheezing or rhonchi. Otherwise clear.   GI: Abdomen soft, nontender, nondistended.   EXTREM: 1+ bilateral pretibial LE edema. No cyanosis or clubbing.  NEURO: Alert and oriented, cooperative. Gait slow, using rolling walker. No obvious focal deficits.  SKIN: Warm and dry. No rashes or petechiae appreciated.       Recent Lab Results:    Outside labs: [facility]    10/16/17  Na 140  K 3.7  Chl 104  C02 28  Glu 97  Ca 9.3  BUN 13  SCr 1.15     New imaging:  None    Nhung Smith PA-C  [facility]  Pager [phone]

## 2017-10-19 NOTE — MR AVS SNAPSHOT
After Visit Summary   10/19/2017    Anastasiya Mota    MRN: [id number]           Patient Information     Date Of Birth          5/1/1929        Visit Information        Provider Department      10/19/2017 3:00 PM Nhung Smith PA [facility] AT [place]        Today's Diagnoses     Lower extremity edema    -  1    SVT (supraventricular tachycardia) (H)        Atrial flutter, unspecified type (H)          Care Instructions    Visit Summary:    Today we discussed:   You have some extra fluid so we are going to add a pill that will help remove some fluid.  Recheck labs in 7-10 days.     Medication changes:    Add spironolactone (Aldactone) 12.5mg daily.    Test results:   Your ECG showed that you are still in sinus rhythm.     Follow up:   With our clinic in 2 weeks.  We will also arrange for you to get a general cardiologist (Dr. Barajas) in a few months.     Please call my nurse Zari at [phone] with any questions or concerns. Scheduling phone number: [phone] if needed.                 Follow-ups after your visit        Additional Services     Follow-Up with Cardiac Advanced Practice Provider           Follow-Up with Cardiologist                 Your next 10 appointments already scheduled     Nov 03, 2017  2:30 PM CDT   Return Visit with Linda Holbrook PA-C   [facility]ART [facility]    [address]  [facility] [address] [phone]            Dec 15, 2017  2:15 PM CST   Return Visit with Vinod Barajas MD   [facility]    [facility] [id number] [phone]              Future tests that were ordered for you today     Open Future Orders        Priority Expected Expires Ordered    Basic metabolic panel Routine 10/26/2017 10/19/2018 10/19/2017    Follow-Up with Cardiac Advanced Practice Provider  "Routine 2017 10/19/2018 10/19/2017    Follow-Up with Cardiologist Routine 2017 10/19/2018 10/19/2017            Who to contact     If you have questions or need follow up information about today's clinic visit or your schedule please contact [facility] [place] directly at [phone].  Normal or non-critical lab and imaging results will be communicated to you by MyChart, letter or phone within 4 business days after the clinic has received the results. If you do not hear from us within 7 days, please contact the clinic through Check-Caph[employer] or phone. If you have a critical or abnormal lab result, we will notify you by phone as soon as possible.  Submit refill requests through [employer] or call your pharmacy and they will forward the refill request to us. Please allow 3 business days for your refill to be completed.          Additional Information About Your Visit        Check-Caphart Information     Jambool lets you send messages to your doctor, view your test results, renew your prescriptions, schedule appointments and more. To sign up, go to [email or web address] . Click on \"Log in\" on the left side of the screen, which will take you to the Welcome page. Then click on \"Sign up Now\" on the right side of the page.     You will be asked to enter the access code listed below, as well as some personal information. Please follow the directions to create your username and password.     Your access code is: [email or web address]  Expires: 2018  4:06 PM     Your access code will  in 90 days. If you need help or a new code, please call your [facility] clinic or [phone].        Care EveryWhere ID     This is your Care EveryWhere ID. This could be used by other organizations to access your [facility] medical records  [id number]        Your Vitals Were     Pulse Height BMI (Body Mass Index)             60 1.626 m (5' 4\") 42.4 kg/m2          Blood Pressure from Last 3 Encounters:   10/19/17 " 132/58   04/26/17 140/66   04/07/17 157/61    Weight from Last 3 Encounters:   10/19/17 112 kg (247 lb)   04/26/17 114.4 kg (252 lb 1.6 oz)   04/06/17 114.9 kg (253 lb 3.2 oz)              We Performed the Following     EKG 12-lead complete w/read - Clinics     Follow-Up with Cardiac Advanced Practice Provider          Today's Medication Changes          These changes are accurate as of: 10/19/17  4:06 PM.  If you have any questions, ask your nurse or doctor.               Start taking these medicines.        Dose/Directions    spironolactone 25 MG tablet   Commonly known as:  ALDACTONE   Used for:  Lower extremity edema   Started by:  Nhung Smith PA        Dose:  12.5 mg   Take 0.5 tablets (12.5 mg) by mouth daily   Quantity:  30 tablet   Refills:  3         These medicines have changed or have updated prescriptions.        Dose/Directions    diltiazem 120 MG 24 hr capsule   Commonly known as:  CARDIZEM CD/CARTIA XT   This may have changed:  how much to take   Used for:  Atrial flutter, unspecified type (H), SVT (supraventricular tachycardia) (H)        Dose:  120 mg   Take 1 capsule (120 mg) by mouth daily   Quantity:  30 capsule   Refills:  0            Where to get your medicines      These medications were sent to [facility] [address] [address]     Phone:  [phone]     spironolactone 25 MG tablet                Primary Care Provider Office Phone # Fax #    [facility] [phone] [address] [address]        Equal Access to Services     ALEXANDRA OLIVEIRA AH: Hadii aad ku hadasho Soomaali, waaxda luqadaha, qaybta kaalmada adeegyamarcia, jeremias lozano. So [facility] [phone].    ATENCIÓN: Si habla español, tiene a vasquez disposición servicios gratuitos de asistencia lingüística. Girishame al [phone].    We comply with applicable federal civil rights  laws and [place] laws. We do not discriminate on the basis of race, color, national origin, age, disability, sex, sexual orientation, or gender identity.            Thank you!     Thank you for choosing [facility] [place]  for your care. Our goal is always to provide you with excellent care. Hearing back from our patients is one way we can continue to improve our services. Please take a few minutes to complete the written survey that you may receive in the mail after your visit with us. Thank you!             Your Updated Medication List - Protect others around you: Learn how to safely use, store and throw away your medicines at www.disposemymeds.org.          This list is accurate as of: 10/19/17  4:06 PM.  Always use your most recent med list.                   Brand Name Dispense Instructions for use Diagnosis    ACETAMINOPHEN PO      Take 1,000 mg by mouth 3 times daily as needed        ANASTROZOLE PO      Take 1 mg by mouth daily        aspirin 81 MG EC tablet     90 tablet    Take 1 tablet (81 mg) by mouth daily    CVA (cerebral vascular accident) (H)       atorvastatin 40 MG tablet    LIPITOR     Take 40 mg by mouth every evening        carvedilol 12.5 MG tablet    COREG    60 tablet    Take 25 mg by mouth 2 times daily (with meals)    Atrial flutter, unspecified type (H), SVT (supraventricular tachycardia) (H)       cloNIDine 0.1 MG tablet    CATAPRES     Take 0.1 mg by mouth 2 times daily as needed        diltiazem 120 MG 24 hr capsule    CARDIZEM CD/CARTIA XT    30 capsule    Take 1 capsule (120 mg) by mouth daily    Atrial flutter, unspecified type (H), SVT (supraventricular tachycardia) (H)       ferrous sulfate 325 (65 FE) MG tablet    IRON     Take 325 mg by mouth daily (with breakfast)        HYDROCHLOROTHIAZIDE PO      Take 25 mg by mouth daily        LANSOPRAZOLE PO      Take 30 mg by mouth every morning (before breakfast)        LISINOPRIL PO      Take 30 mg by  mouth daily        melatonin 3 MG tablet      Take 3 mg by mouth nightly as needed for sleep        ROBAFEN 100 MG/5ML Syrp   Generic drug:  guaiFENesin      Take 10 mLs by mouth every 6 hours as needed for cough        SENEXON-S 8.6-50 MG per tablet   Generic drug:  senna-docusate      Take 2 tablets by mouth 2 times daily        spironolactone 25 MG tablet    ALDACTONE    30 tablet    Take 0.5 tablets (12.5 mg) by mouth daily    Lower extremity edema       ZOFRAN PO      Take 4 mg by mouth every 8 hours as needed for nausea or vomiting        ZOLOFT PO      Take 50 mg by mouth daily

## 2017-10-20 ENCOUNTER — TELEPHONE (OUTPATIENT)
Dept: CARDIOLOGY | Facility: CLINIC | Age: 82
End: 2017-10-20

## 2017-10-20 NOTE — TELEPHONE ENCOUNTER
"Received a call from Zari Shin RN from [facility]. Nhung MARQUEZ saw patient yesterday and added Spironolactone 12.5 mg daily. They need an order. Caterina left me her phone number ([phone]), and fax number ([phone]). I faxed the signed order to Zari Shin. I tried calling Zari Shin, messaged stated \" Wireless customer is not available, try again later.\" I tried calling [employer] ([phone]), pressed 5 to get the Nurses station, message stated \"I am unable to do that at this time.\"    I left a message for patient's daughter (Cyndy) to return my call.     TGavirginie TSANG  [facility]     "

## 2017-10-26 NOTE — TELEPHONE ENCOUNTER
Spoke to Zari Shin RN ( or Eugenia?), she provided two other phone numbers to call, [phone] or [phone], messages can be left. She stated they do not have a physician order form. They only can send a med list, wgts and vitals (if available). If there are new orders, a signed prescription needs to be sent back with patient or we can E-scribe to [facility]. Zari Shin confirmed that she did receive our order for Spironolactone and it was initiated.     I mentioned to Zari Shin, that they may want to look in to why there other phone numbers are not working.    TGavirginie TSANG  [facility]

## 2017-10-26 NOTE — TELEPHONE ENCOUNTER
I spoke to Cyndy, patient's daughter. She confirmed that patient is taking Spironolactone. Cyndy gave me one more number to try, [phone]. I called the number and left a message asking for a return call. I discussed with Cyndy that the facility should be providing an envelope that includes a physician order form, med list, and vitals. They should bring the envelope to patient's next appt. If there are new orders, the provider with fill out the form and signed it and will send it back with them to give to nursing. Cyndy had no questions or concerns.     TGavirginie TSANG  [facility]

## 2017-10-30 ENCOUNTER — TRANSFERRED RECORDS (OUTPATIENT)
Dept: HEALTH INFORMATION MANAGEMENT | Facility: CLINIC | Age: 82
End: 2017-10-30

## 2017-10-30 LAB
ANION GAP SERPL CALCULATED.3IONS-SCNC: 8 MMOL/L (ref 5–18)
BUN SERPL-MCNC: 25 MG/DL (ref 8–28)
CALCIUM SERPL-MCNC: 9 MG/DL (ref 8.5–10.5)
CHLORIDE SERPLBLD-SCNC: 108 MMOL/L (ref 98–107)
CO2 SERPL-SCNC: 27 MMOL/L (ref 22–31)
CREAT SERPL-MCNC: 1.57 MG/DL (ref 0.6–1.1)
GFR SERPL CREATININE-BSD FRML MDRD: 38 ML/MIN/1.73M2 (ref 60–?)
GLUCOSE SERPL-MCNC: 101 MG/DL (ref 70–125)
POTASSIUM SERPL-SCNC: 4.2 MMOL/L (ref 3.5–5)
SODIUM SERPL-SCNC: 143 MMOL/L (ref 136–145)

## 2017-10-31 ENCOUNTER — MEDICAL CORRESPONDENCE (OUTPATIENT)
Dept: HEALTH INFORMATION MANAGEMENT | Facility: CLINIC | Age: 82
End: 2017-10-31

## 2017-10-31 ENCOUNTER — TRANSFERRED RECORDS (OUTPATIENT)
Dept: HEALTH INFORMATION MANAGEMENT | Facility: CLINIC | Age: 82
End: 2017-10-31

## 2017-11-03 ENCOUNTER — OFFICE VISIT (OUTPATIENT)
Dept: CARDIOLOGY | Facility: CLINIC | Age: 82
End: 2017-11-03
Payer: MEDICARE

## 2017-11-03 VITALS
HEIGHT: 64 IN | OXYGEN SATURATION: 99 % | WEIGHT: 238 LBS | HEART RATE: 58 BPM | BODY MASS INDEX: 40.63 KG/M2 | DIASTOLIC BLOOD PRESSURE: 60 MMHG | SYSTOLIC BLOOD PRESSURE: 110 MMHG

## 2017-11-03 DIAGNOSIS — I10 BENIGN ESSENTIAL HYPERTENSION: Primary | ICD-10-CM

## 2017-11-03 DIAGNOSIS — R60.0 LOWER EXTREMITY EDEMA: ICD-10-CM

## 2017-11-03 DIAGNOSIS — I48.92 ATRIAL FLUTTER, UNSPECIFIED TYPE (H): ICD-10-CM

## 2017-11-03 DIAGNOSIS — I47.10 SVT (SUPRAVENTRICULAR TACHYCARDIA) (H): ICD-10-CM

## 2017-11-03 DIAGNOSIS — I48.91 ATRIAL FIBRILLATION (H): Primary | ICD-10-CM

## 2017-11-03 PROCEDURE — 99214 OFFICE O/P EST MOD 30 MIN: CPT | Performed by: PHYSICIAN ASSISTANT

## 2017-11-03 RX ORDER — LISINOPRIL 20 MG/1
20 TABLET ORAL EVERY EVENING
COMMUNITY
Start: 2017-11-03 | End: 2017-11-03

## 2017-11-03 RX ORDER — DILTIAZEM HYDROCHLORIDE 240 MG/1
240 CAPSULE, COATED, EXTENDED RELEASE ORAL DAILY
COMMUNITY
Start: 2017-11-03 | End: 2018-04-05

## 2017-11-03 RX ORDER — LISINOPRIL 20 MG/1
20 TABLET ORAL EVERY EVENING
Qty: 30 TABLET | Refills: 3 | Status: ON HOLD | OUTPATIENT
Start: 2017-11-03 | End: 2022-01-01

## 2017-11-03 NOTE — PATIENT INSTRUCTIONS
1.  Decrease the lisinopril to 20 mg due to the creatinine of 1.57.  With the BPs being elevated in the AM at [place], will change the timing of the lisinopril dosing to the evening.   2.  The BP here is well controlled at 112/60 and 110/60.   3.  BMP in 1 week. Please fax results to heart clinic along with recent BPs. [phone].

## 2017-11-03 NOTE — PROGRESS NOTES
Cardiology Progress Note    Date of Service: 11/03/2017      Patient seen today in follow up of: hypertension     Primary cardiologist: Seen previously by EP only--Dr. Peterson.  Will be establishing with Dr. Barajas.     HPI:  I had the pleasure of meeting Anastasiya Mota along with her daughter today in the cardiology clinic for follow up.     Anastasiya is a pleasant 88 year old female with a PMHx of pSVT and aflutter, dating back to 2015 after a prolonged hospitalization at [place] for a GI bleed, at which time she had a NSTEMI, ASAD, and developed tachycardia, felt to be SVT/aflutter. She was then admitted at [facility] in December 2016, noted to be tachycardic once again, placed on diltiazem, and subsequently amiodarone after being seen by EP. In February of 2017 her amio was discontinued, and she underwent a successful SVT ablation in March 2017. She was seen back in follow up by EP in April, and was doing well, with no further tachyarrythmias on event monitor. Her AC was ultimately stopped once again due to GI bleed concerns and she is now only on an ASA. She was then to be followed on a PRN basis.      Of note, her last echo was in Dec 2016, and at that time her EF was 60-65% with no regional wall motion abnormalities. No significant valvular abnormalities noted.     Her daughter requested a visit due to hypertension. She saw Nhung Smith NP 10/19/17.  Her assisted living facility had noted hypertension.  BPs were ranging generally from 153-170 systolic and diastolics in the 70s. They uptitrated some of her antihypertensives, but recommended she see cardiology for further recommendations. 10/19/17 she was currently on Lisinopril 30mg daily, Coreg 25mg BID, HCTZ 25mg daily, and clonidine which is PRN. Also on diltiazem. She noted some QUINTANILLA and some edema. Nhung added in spironolactone 12.5 mg daily.     A BMP drawn about 2 weeks later (10/30/17) showed the creat had gone from 1.31 to 1.57.     They present today for follow  up. Her edema has resolved. She still has some mild QUINTANILLA> she also notes some orthostasis at night, but this is not new. Her BPs in the morning remain elevated in the 150s-180s. Diastolics typically in the 70s. The daughter reports Bps are better controlled in the evening when she visits.   She denies CP, palpitations, dizziness, or orthopnea. She has some nausea in the morning with all her pills.   Here BPs are well controlled. BP today was 112/60. I repeated the BP myself manually and it was 110/60.       ASSESSMENT/PLAN:    1.  Hypertension.   --Reportedly somewhat difficult to control in the past as well. Of note, while her SBPs are reported 150-180s at her assisted living facility, these are in the AM before meds. Here BP is well controlled.    -- Continue ACE, BB, HCTZ, spironolactone, diltiazem. But decrease lisinopril to 20mg due to creat and move to the evening with hopes that it helps her AM pressures.    -- BMP in 1 week at her assisted living facility. Results to be faxed to our clinic. If creat remains up, we may need to decrease the HCTZ.       2.  Hx of SVT/aflutter, s/p ablation.    --Clinically do not suspect recurrence of tachyarrythmia.    --Chronically remains on diltiazem, no change today; can revisit if continues to have issues with volume retention.    --Given hx of GIB she remains off full AC, and on ASA only.      3.  Mild QUINTANILLA and edema.    -- improved with addition of spironolactone   -- creat is up today, decreasing lisinopril. Will repeat BMP in 1 week. May need to decrease HCTZ.    -- Consider repeat echo. Last was 12/2016 as noted above.     Follow up plan:  BMP in 1 week. Will notify facility with recs.   Seeing Dr. Barajas in December as new formal general cardiology (Nhung had discussed case with him)      Orders this Visit:  Orders Placed This Encounter   Procedures     Basic metabolic panel     Orders Placed This Encounter   Medications     diltiazem 240 MG 24 hr capsule     Sig: Take 1  capsule (240 mg) by mouth daily     DISCONTD: lisinopril (PRINIVIL/ZESTRIL) 20 MG tablet     Sig: Take 1 tablet (20 mg) by mouth every evening     lisinopril (PRINIVIL/ZESTRIL) 20 MG tablet     Sig: Take 1 tablet (20 mg) by mouth every evening     Dispense:  30 tablet     Refill:  3     Medications Discontinued During This Encounter   Medication Reason     guaiFENesin (ROBAFEN) 100 MG/5ML SYRP Therapy completed     diltiazem 120 MG 24 hr capsule Reorder     LISINOPRIL PO Reorder     lisinopril (PRINIVIL/ZESTRIL) 20 MG tablet Reorder         CURRENT MEDICATIONS:  Current Outpatient Prescriptions   Medication Sig Dispense Refill     diltiazem 240 MG 24 hr capsule Take 1 capsule (240 mg) by mouth daily       lisinopril (PRINIVIL/ZESTRIL) 20 MG tablet Take 1 tablet (20 mg) by mouth every evening 30 tablet 3     cloNIDine (CATAPRES) 0.1 MG tablet Take 0.1 mg by mouth 2 times daily as needed       melatonin 3 MG tablet Take 3 mg by mouth nightly as needed for sleep       spironolactone (ALDACTONE) 25 MG tablet Take 0.5 tablets (12.5 mg) by mouth daily 30 tablet 3     aspirin 81 MG EC tablet Take 1 tablet (81 mg) by mouth daily 90 tablet 3     Ondansetron HCl (ZOFRAN PO) Take 4 mg by mouth every 8 hours as needed for nausea or vomiting       Sertraline HCl (ZOLOFT PO) Take 50 mg by mouth daily       carvedilol (COREG) 12.5 MG tablet Take 25 mg by mouth 2 times daily (with meals)  60 tablet 3     ferrous sulfate (IRON) 325 (65 FE) MG tablet Take 325 mg by mouth daily (with breakfast)        ANASTROZOLE PO Take 1 mg by mouth daily        HYDROCHLOROTHIAZIDE PO Take 25 mg by mouth daily       LANSOPRAZOLE PO Take 30 mg by mouth every morning (before breakfast)        senna-docusate (SENEXON-S) 8.6-50 MG per tablet Take 2 tablets by mouth 2 times daily       atorvastatin (LIPITOR) 40 MG tablet Take 40 mg by mouth every evening        ACETAMINOPHEN PO Take 1,000 mg by mouth 3 times daily as needed        [DISCONTINUED]  lisinopril (PRINIVIL/ZESTRIL) 20 MG tablet Take 1 tablet (20 mg) by mouth every evening       [DISCONTINUED] LISINOPRIL PO Take 30 mg by mouth daily        [DISCONTINUED] diltiazem 120 MG 24 hr capsule Take 1 capsule (120 mg) by mouth daily (Patient taking differently: Take 240 mg by mouth daily ) 30 capsule 0       ALLERGIES   No Known Allergies    PAST MEDICAL HISTORY:  Past Medical History:   Diagnosis Date     Acid reflux      Atrial fibrillation (H)      Breast cancer (H)      Cancer (H)      Cerebral infarction (H)      Constipation      Elevated cholesterol      Hypertension      Nausea      Paroxysmal supraventricular tachycardia (H)      SVT (supraventricular tachycardia) (H)        PAST SURGICAL HISTORY:  History reviewed. No pertinent surgical history.    FAMILY HISTORY:  Family History   Problem Relation Age of Onset     Unknown/Adopted No family hx of        SOCIAL HISTORY:  Social History     Social History     Marital status:      Spouse name: N/A     Number of children: N/A     Years of education: N/A     Social History Main Topics     Smoking status: Never Smoker     Smokeless tobacco: Never Used     Alcohol use No     Drug use: None     Sexual activity: Not Asked     Other Topics Concern     Caffeine Concern No     Special Diet No     regular diet      Exercise No     Social History Narrative       Review of Systems:  Cardiovascular: negative for chest pain, palpitations, orthopnea, PND, neg for LE edema, + orthostasis at noc  Constitutional: negative for chills, sweats, fever, unintentional weight loss. Pos for weight gain.  Resp: Negative for dyspnea at rest, pos for mild dyspnea on exertion, improved some, neg for productive cough, wheezing, hemoptysis.   HEENT: Negative for visual changes, sinus congestion  Gastrointestinal: negative for abdominal pain, constipation, diarrhea, blood in stool, vomiting, pos nausea  Hematologic/lymphatic: negative for current blood thinners, hx of blood  "clots  Musculoskeletal: negative for new back pain, falls, neck pain  Neurological: negative for focal weakness, LOC, seizures      Physical Exam:  Vitals: /60 (BP Location: Right arm, Patient Position: Sitting, Cuff Size: Adult Regular)  Pulse 58  Ht 1.626 m (5' 4\")  Wt 108 kg (238 lb)  SpO2 99%  BMI 40.85 kg/m2   Wt Readings from Last 4 Encounters:   11/03/17 108 kg (238 lb)   10/19/17 112 kg (247 lb)   04/26/17 114.4 kg (252 lb 1.6 oz)   04/06/17 114.9 kg (253 lb 3.2 oz)       GEN:  In general, this is an overweight AAF in no acute distress on RA. She is a poor historian. Patient ambulatory with use of rolling walker.   HEENT:  Pupils equal, round. Sclerae nonicteric. Clear oropharynx. Mucous membranes moist.  NECK: Supple, no masses appreciated. Trachea midline. No appreciable JVD, sitting upright.   C/V:  Regular rate and rhythm, with intermittent extra beat. No murmur, rub or gallop. No S3 or RV heave.   RESP: Respirations are unlabored. No use of accessory muscles. CTAB  GI: Abdomen obese, soft, nontender, nondistended.   EXTREM: no edema. No cyanosis or clubbing.  NEURO: Alert and oriented, cooperative. Gait slow, using rolling walker. No obvious focal deficits.  SKIN: Warm and dry. No rashes or petechiae appreciated.       Recent Lab Results:    Outside labs: [facility]    10/16/17  Na 140  K 3.7  Chl 104  C02 28  Glu 97  Ca 9.3  BUN 13  SCr 1.15     10/30/17 at [facility] assisted living  Component      Latest Ref Rng & Units 10/30/2017   Sodium      136 - 145 mmol/L 143   Potassium      3.5 - 5.0 mmol/L 4.2   Chloride      98 - 107 mmol/L 108 (A)   Carbon Dioxide      22 - 31 mmol/L 27   Anion Gap      5 - 18 mmol/L 8   Glucose      70 - 125 mg/dL 101   Urea Nitrogen      8 - 28 mg/dL 25   Creatinine      0.6 - 1.1 mg/dL 1.57 (A)   Calcium      8.5 - 10.5 mg/dL 9.0   GFR Estimate      60 ml/min/1.73m2 38 (A)   GFR Estimate If Black      60 ml/min/1.73m2 31 (A) "       New imaging:  None    Linda Holbrook PA-C 11/3/2017 3:22 PM

## 2017-11-03 NOTE — LETTER
11/3/2017    [facility]  [id number] [address] [address]    RE: Anastasiya Billte       Dear Colleague,    I had the pleasure of seeing Anastasiya Mota in the [facility].    Date of Service: 11/03/2017      Patient seen today in follow up of: hypertension     Primary cardiologist: Seen previously by EP only--Dr. Peterson.  Will be establishing with Dr. Barajas.     HPI:  I had the pleasure of meeting Anastasiya Mota along with her daughter today in the cardiology clinic for follow up.     Anastasiya is a pleasant 88 year old female with a PMHx of pSVT and aflutter, dating back to 2015 after a prolonged hospitalization at [place] for a GI bleed, at which time she had a NSTEMI, ASAD, and developed tachycardia, felt to be SVT/aflutter. She was then admitted at [facility] in December 2016, noted to be tachycardic once again, placed on diltiazem, and subsequently amiodarone after being seen by EP. In February of 2017 her amio was discontinued, and she underwent a successful SVT ablation in March 2017. She was seen back in follow up by EP in April, and was doing well, with no further tachyarrythmias on event monitor. Her AC was ultimately stopped once again due to GI bleed concerns and she is now only on an ASA. She was then to be followed on a PRN basis.      Of note, her last echo was in Dec 2016, and at that time her EF was 60-65% with no regional wall motion abnormalities. No significant valvular abnormalities noted.     Her daughter requested a visit due to hypertension. She saw Nhung Smith NP 10/19/17.  Her assisted living facility had noted hypertension.  BPs were ranging generally from 153-170 systolic and diastolics in the 70s. They uptitrated some of her antihypertensives, but recommended she see cardiology for further recommendations. 10/19/17 she was currently on Lisinopril 30mg daily, Coreg 25mg BID, HCTZ 25mg daily, and clonidine which is PRN. Also on  diltiazem. She noted some QUINTANILLA and some edema. Nhung added in spironolactone 12.5 mg daily.     A BMP drawn about 2 weeks later (10/30/17) showed the creat had gone from 1.31 to 1.57.     They present today for follow up. Her edema has resolved. She still has some mild QUINTANILLA> she also notes some orthostasis at night, but this is not new. Her BPs in the morning remain elevated in the 150s-180s. Diastolics typically in the 70s. The daughter reports Bps are better controlled in the evening when she visits.   She denies CP, palpitations, dizziness, or orthopnea. She has some nausea in the morning with all her pills.   Here BPs are well controlled. BP today was 112/60. I repeated the BP myself manually and it was 110/60.       ASSESSMENT/PLAN:    1.  Hypertension.   --Reportedly somewhat difficult to control in the past as well. Of note, while her SBPs are reported 150-180s at her assisted living facility, these are in the AM before meds. Here BP is well controlled.    -- Continue ACE, BB, HCTZ, spironolactone, diltiazem. But decrease lisinopril to 20mg due to creat and move to the evening with hopes that it helps her AM pressures.    -- BMP in 1 week at her assisted living facility. Results to be faxed to our clinic. If creat remains up, we may need to decrease the HCTZ.       2.  Hx of SVT/aflutter, s/p ablation.    --Clinically do not suspect recurrence of tachyarrythmia.    --Chronically remains on diltiazem, no change today; can revisit if continues to have issues with volume retention.    --Given hx of GIB she remains off full AC, and on ASA only.      3.  Mild QUINTANILLA and edema.    -- improved with addition of spironolactone   -- creat is up today, decreasing lisinopril. Will repeat BMP in 1 week. May need to decrease HCTZ.    -- Consider repeat echo. Last was 12/2016 as noted above.     Follow up plan:  BMP in 1 week. Will notify facility with recs.   Seeing Dr. Barajas in December as new formal general cardiology (Nhung  had discussed case with him)      Orders this Visit:  Orders Placed This Encounter   Procedures     Basic metabolic panel     Orders Placed This Encounter   Medications     diltiazem 240 MG 24 hr capsule     Sig: Take 1 capsule (240 mg) by mouth daily     DISCONTD: lisinopril (PRINIVIL/ZESTRIL) 20 MG tablet     Sig: Take 1 tablet (20 mg) by mouth every evening     lisinopril (PRINIVIL/ZESTRIL) 20 MG tablet     Sig: Take 1 tablet (20 mg) by mouth every evening     Dispense:  30 tablet     Refill:  3     Medications Discontinued During This Encounter   Medication Reason     guaiFENesin (ROBAFEN) 100 MG/5ML SYRP Therapy completed     diltiazem 120 MG 24 hr capsule Reorder     LISINOPRIL PO Reorder     lisinopril (PRINIVIL/ZESTRIL) 20 MG tablet Reorder         CURRENT MEDICATIONS:  Current Outpatient Prescriptions   Medication Sig Dispense Refill     diltiazem 240 MG 24 hr capsule Take 1 capsule (240 mg) by mouth daily       lisinopril (PRINIVIL/ZESTRIL) 20 MG tablet Take 1 tablet (20 mg) by mouth every evening 30 tablet 3     cloNIDine (CATAPRES) 0.1 MG tablet Take 0.1 mg by mouth 2 times daily as needed       melatonin 3 MG tablet Take 3 mg by mouth nightly as needed for sleep       spironolactone (ALDACTONE) 25 MG tablet Take 0.5 tablets (12.5 mg) by mouth daily 30 tablet 3     aspirin 81 MG EC tablet Take 1 tablet (81 mg) by mouth daily 90 tablet 3     Ondansetron HCl (ZOFRAN PO) Take 4 mg by mouth every 8 hours as needed for nausea or vomiting       Sertraline HCl (ZOLOFT PO) Take 50 mg by mouth daily       carvedilol (COREG) 12.5 MG tablet Take 25 mg by mouth 2 times daily (with meals)  60 tablet 3     ferrous sulfate (IRON) 325 (65 FE) MG tablet Take 325 mg by mouth daily (with breakfast)        ANASTROZOLE PO Take 1 mg by mouth daily        HYDROCHLOROTHIAZIDE PO Take 25 mg by mouth daily       LANSOPRAZOLE PO Take 30 mg by mouth every morning (before breakfast)        senna-docusate (SENEXON-S) 8.6-50 MG per  tablet Take 2 tablets by mouth 2 times daily       atorvastatin (LIPITOR) 40 MG tablet Take 40 mg by mouth every evening        ACETAMINOPHEN PO Take 1,000 mg by mouth 3 times daily as needed        [DISCONTINUED] lisinopril (PRINIVIL/ZESTRIL) 20 MG tablet Take 1 tablet (20 mg) by mouth every evening       [DISCONTINUED] LISINOPRIL PO Take 30 mg by mouth daily        [DISCONTINUED] diltiazem 120 MG 24 hr capsule Take 1 capsule (120 mg) by mouth daily (Patient taking differently: Take 240 mg by mouth daily ) 30 capsule 0       ALLERGIES   No Known Allergies    PAST MEDICAL HISTORY:  Past Medical History:   Diagnosis Date     Acid reflux      Atrial fibrillation (H)      Breast cancer (H)      Cancer (H)      Cerebral infarction (H)      Constipation      Elevated cholesterol      Hypertension      Nausea      Paroxysmal supraventricular tachycardia (H)      SVT (supraventricular tachycardia) (H)        PAST SURGICAL HISTORY:  History reviewed. No pertinent surgical history.    FAMILY HISTORY:  Family History   Problem Relation Age of Onset     Unknown/Adopted No family hx of        SOCIAL HISTORY:  Social History     Social History     Marital status:      Spouse name: N/A     Number of children: N/A     Years of education: N/A     Social History Main Topics     Smoking status: Never Smoker     Smokeless tobacco: Never Used     Alcohol use No     Drug use: None     Sexual activity: Not Asked     Other Topics Concern     Caffeine Concern No     Special Diet No     regular diet      Exercise No     Social History Narrative       Review of Systems:  Cardiovascular: negative for chest pain, palpitations, orthopnea, PND, neg for LE edema, + orthostasis at noc  Constitutional: negative for chills, sweats, fever, unintentional weight loss. Pos for weight gain.  Resp: Negative for dyspnea at rest, pos for mild dyspnea on exertion, improved some, neg for productive cough, wheezing, hemoptysis.   HEENT: Negative for  "visual changes, sinus congestion  Gastrointestinal: negative for abdominal pain, constipation, diarrhea, blood in stool, vomiting, pos nausea  Hematologic/lymphatic: negative for current blood thinners, hx of blood clots  Musculoskeletal: negative for new back pain, falls, neck pain  Neurological: negative for focal weakness, LOC, seizures      Physical Exam:  Vitals: /60 (BP Location: Right arm, Patient Position: Sitting, Cuff Size: Adult Regular)  Pulse 58  Ht 1.626 m (5' 4\")  Wt 108 kg (238 lb)  SpO2 99%  BMI 40.85 kg/m2   Wt Readings from Last 4 Encounters:   11/03/17 108 kg (238 lb)   10/19/17 112 kg (247 lb)   04/26/17 114.4 kg (252 lb 1.6 oz)   04/06/17 114.9 kg (253 lb 3.2 oz)       GEN:  In general, this is an overweight AAF in no acute distress on RA. She is a poor historian. Patient ambulatory with use of rolling walker.   HEENT:  Pupils equal, round. Sclerae nonicteric. Clear oropharynx. Mucous membranes moist.  NECK: Supple, no masses appreciated. Trachea midline. No appreciable JVD, sitting upright.   C/V:  Regular rate and rhythm, with intermittent extra beat. No murmur, rub or gallop. No S3 or RV heave.   RESP: Respirations are unlabored. No use of accessory muscles. CTAB  GI: Abdomen obese, soft, nontender, nondistended.   EXTREM: no edema. No cyanosis or clubbing.  NEURO: Alert and oriented, cooperative. Gait slow, using rolling walker. No obvious focal deficits.  SKIN: Warm and dry. No rashes or petechiae appreciated.       Recent Lab Results:    Outside labs: [facility]    10/16/17  Na 140  K 3.7  Chl 104  C02 28  Glu 97  Ca 9.3  BUN 13  SCr 1.15     10/30/17 at [facility] assisted living  Component      Latest Ref Rng & Units 10/30/2017   Sodium      136 - 145 mmol/L 143   Potassium      3.5 - 5.0 mmol/L 4.2   Chloride      98 - 107 mmol/L 108 (A)   Carbon Dioxide      22 - 31 mmol/L 27   Anion Gap      5 - 18 mmol/L 8   Glucose      70 - 125 mg/dL 101 "   Urea Nitrogen      8 - 28 mg/dL 25   Creatinine      0.6 - 1.1 mg/dL 1.57 (A)   Calcium      8.5 - 10.5 mg/dL 9.0   GFR Estimate      60 ml/min/1.73m2 38 (A)   GFR Estimate If Black      60 ml/min/1.73m2 31 (A)       New imaging:  None    Linda Holbrook PA-C 11/3/2017 3:22 PM    Thank you for allowing me to participate in the care of your patient.    Sincerely,     Linda Holbrook PA-C     [facility]

## 2017-11-13 ENCOUNTER — TRANSFERRED RECORDS (OUTPATIENT)
Dept: HEALTH INFORMATION MANAGEMENT | Facility: CLINIC | Age: 82
End: 2017-11-13

## 2017-11-13 LAB
ANION GAP SERPL CALCULATED.3IONS-SCNC: 6 MMOL/L (ref 5–18)
BUN SERPL-MCNC: 33 MG/DL (ref 8–28)
CALCIUM SERPL-MCNC: 8.7 MG/DL (ref 8.5–10.5)
CHLORIDE SERPLBLD-SCNC: 111 MMOL/L (ref 95–107)
CO2 SERPL-SCNC: 24 MMOL/L (ref 22–31)
CREAT SERPL-MCNC: 1.45 MG/DL (ref 0.6–1.1)
GFR SERPL CREATININE-BSD FRML MDRD: 34 ML/MIN/1.73M2
GLUCOSE SERPL-MCNC: 101 MG/DL (ref 70–125)
POTASSIUM SERPL-SCNC: 4.6 MMOL/L (ref 3.5–5)
SODIUM SERPL-SCNC: 141 MMOL/L (ref 136–145)

## 2017-11-14 ENCOUNTER — CARE COORDINATION (OUTPATIENT)
Dept: CARDIOLOGY | Facility: CLINIC | Age: 82
End: 2017-11-14

## 2017-11-14 NOTE — PROGRESS NOTES
Let's have her decrease HCTZ to 12.5 mg daily.   BMP in 2 weeks. Please have it faxed here.   If she starts to retain fluid or BP elevated before 2 weeks, please let us know.   Linda Holbrook PA-C 11/14/2017 5:51 PM

## 2017-11-14 NOTE — PROGRESS NOTES
Received BMP and BP log from [facility].     BMP from 10-30-17  BUN 25  Creatinine 1.57 (H)  GFR 38 (L)    BMP from 11-13-17 - received from [facility]  BUN 33 (H)  Creatinine 1.45 (H)  GFR 34 (L)     BP log  Systolic 121-169 mm Hg  Diastolic 54-72 mm Hg  Pulse 65-67 bpm    BP's were taken between 7:46 am - 8:57 am.    ------------------------------------------------------------------------------------    Linda MORROW recently decreased Lisinopril to 20 mg a day and move it to the evening.   Per Linda, depending on BMP, if creatinine remains up, may need to decrease HCTZ.  Recent BMP shows worsening of BUN, but improvement of creatinine.  Am BP's look better, before patient's SBP's were in 150-180's.  Messaged Linda to review.     TGarbers RN  [facility]

## 2017-11-15 NOTE — PROGRESS NOTES
Reviewed Linda MORROW's recommendations.   Signed order was faxed to [facility].  Reviewed recommendations with Rissa TSANG at [facility].     TGavirginie TSANG  [facility]

## 2017-11-16 DIAGNOSIS — I10 BENIGN ESSENTIAL HYPERTENSION: Primary | ICD-10-CM

## 2017-11-16 RX ORDER — HYDROCHLOROTHIAZIDE 12.5 MG/1
12.5 CAPSULE ORAL DAILY
Refills: 0 | COMMUNITY
Start: 2017-11-16 | End: 2017-12-08 | Stop reason: DRUGHIGH

## 2017-11-29 ENCOUNTER — TRANSFERRED RECORDS (OUTPATIENT)
Dept: HEALTH INFORMATION MANAGEMENT | Facility: CLINIC | Age: 82
End: 2017-11-29

## 2017-11-29 LAB
ANION GAP SERPL CALCULATED.3IONS-SCNC: 5 MMOL/L (ref 5–18)
BUN SERPL-MCNC: 33 MG/DL (ref 8–28)
CALCIUM SERPL-MCNC: 8.8 MG/DL (ref 8.5–10.5)
CHLORIDE SERPLBLD-SCNC: 114 MMOL/L (ref 98–107)
CO2 SERPL-SCNC: 24 MMOL/L (ref 22–31)
CREAT SERPL-MCNC: 1.61 MG/DL (ref 0.6–1.1)
GFR SERPL CREATININE-BSD FRML MDRD: 30 ML/MIN/1.73M2
GLUCOSE SERPL-MCNC: 78 MG/DL (ref 70–125)
POTASSIUM SERPL-SCNC: 4.8 MMOL/L (ref 3.5–5)
SODIUM SERPL-SCNC: 143 MMOL/L (ref 136–145)

## 2017-11-30 NOTE — PROGRESS NOTES
Creat and BUN up.   Any CHF sxs?  If not, please have her hold HCTZ through the weekend and repeat BMP next Monday. Please have faxed to us.   Linda Holbrook PA-C 11/30/2017 5:12 PM

## 2017-11-30 NOTE — PROGRESS NOTES
Received BMP results from [facility]. On 11/14/17 Linda Holbrook PA-C advised to decrease HCTZ to 12.5mg daily and to let us know if she retains fluid or if BP elevated. 2 wk BMP results below compared to 11/13/17. Cr elevated.   Component      Latest Ref Rng & Units 11/13/2017 11/29/2017   Sodium      136 - 145 mmol/L 141 143   Potassium      3.5 - 5.0 mmol/L 4.6 4.8   Chloride      98 - 107 mmol/L 111 (A) 114 (A)   Carbon Dioxide      22 - 31 mmol/L 24 24   Anion Gap      5 - 18 mmol/L 6 5   Glucose      70 - 125 mg/dL 101 78   Urea Nitrogen      8 - 28 mg/dL 33 (A) 33 (A)   Creatinine      0.60 - 1.10 mg/dL 1.45 (A) 1.61 (A)   Calcium      8.5 - 10.5 mg/dL 8.7 8.8   GFR Estimate      >60 ml/min/1.73m2 34 30 (A)   GFR Estimate If Black      >60 ml/min/1.73m2 41 37 (A)     Routing to Linda Holbrook PA-C

## 2017-12-04 NOTE — PROGRESS NOTES
Called pt's daughter shannan (consent to communicate on file) who is point of contact for pt. Pt denies any symptoms, does not have any swelling or SOB. Advised of BMP results and that I would be calling pt's facility to hold HCTZ for a couple days and draw lab 12/7/17. Sent fax with order to hold HCTZ and lab (BMP) to be drawn 12/7/17. Also spoke with Rissa TSANG at [facility] who confirmed they received the orders. They will fax BMP results when available.   Chely TSANG

## 2017-12-07 ENCOUNTER — TRANSFERRED RECORDS (OUTPATIENT)
Dept: HEALTH INFORMATION MANAGEMENT | Facility: CLINIC | Age: 82
End: 2017-12-07

## 2017-12-07 LAB
ANION GAP SERPL CALCULATED.3IONS-SCNC: 6 MMOL/L (ref 5–18)
BUN SERPL-MCNC: 30 MG/DL (ref 8–28)
CALCIUM SERPL-MCNC: 8.6 MG/DL (ref 8.5–10.5)
CHLORIDE SERPLBLD-SCNC: 111 MMOL/L (ref 98–107)
CO2 SERPL-SCNC: 26 MMOL/L (ref 22–31)
CREAT SERPL-MCNC: 1.56 MG/DL (ref 0.6–1.1)
GFR SERPL CREATININE-BSD FRML MDRD: 31 ML/MIN/1.73M2
GLUCOSE SERPL-MCNC: 117 MG/DL (ref 70–125)
POTASSIUM SERPL-SCNC: 4.6 MMOL/L (ref 3.5–5)
SODIUM SERPL-SCNC: 143 MMOL/L (ref 136–145)

## 2017-12-08 DIAGNOSIS — I10 BENIGN ESSENTIAL HYPERTENSION: Primary | ICD-10-CM

## 2017-12-08 NOTE — PROGRESS NOTES
Pt daughter called me back, pt is asymptomatic, no swelling, no SOB, feeling good. Routing to Linda Holbrook PA-C for recommendation. Cr down to 1.56 from 1.61 (on 11/29). Continue to hold HCTZ vs restart?  Chely TSANG

## 2017-12-08 NOTE — PROGRESS NOTES
Called pt's daughter Cyndy who is bringing pt to her OV next week. Scheduled for BMP 12/15/17 at 1:20pm, seeing Dr. Barajas at 2:15pm.  Order entered for BMP.   Chely TSANG

## 2017-12-08 NOTE — PROGRESS NOTES
Received fax of BMP results from [facility]. Cr decreased to 1.56 (was 1.61 on 11/29/17). Has been holding HCTZ since 12/4/17. Called facility back to inquire on if pt has any sxs, left vm to call me back. Called pt's daughter Cyndy as well to see if she has seen/spoke w/ pt, no answer, left  to call me back.   Chely TSANG

## 2017-12-08 NOTE — PROGRESS NOTES
She can hold until she sees Dr. Barajas in 1 week.   BMP when she sees him.     He may resume HCTZ and DC the spironolactone that was recently started.

## 2017-12-11 DIAGNOSIS — I48.91 ATRIAL FIBRILLATION (H): Primary | ICD-10-CM

## 2017-12-15 ENCOUNTER — OFFICE VISIT (OUTPATIENT)
Dept: CARDIOLOGY | Facility: CLINIC | Age: 82
End: 2017-12-15
Payer: MEDICARE

## 2017-12-15 VITALS
SYSTOLIC BLOOD PRESSURE: 128 MMHG | HEART RATE: 54 BPM | BODY MASS INDEX: 41.01 KG/M2 | HEIGHT: 64 IN | DIASTOLIC BLOOD PRESSURE: 75 MMHG | WEIGHT: 240.2 LBS

## 2017-12-15 DIAGNOSIS — I10 BENIGN ESSENTIAL HYPERTENSION: ICD-10-CM

## 2017-12-15 DIAGNOSIS — I10 BENIGN ESSENTIAL HYPERTENSION: Primary | ICD-10-CM

## 2017-12-15 DIAGNOSIS — R60.0 LOWER EXTREMITY EDEMA: ICD-10-CM

## 2017-12-15 LAB
ANION GAP SERPL CALCULATED.3IONS-SCNC: 10.4 MMOL/L (ref 6–17)
BUN SERPL-MCNC: 28 MG/DL (ref 7–30)
CALCIUM SERPL-MCNC: 9.1 MG/DL (ref 8.5–10.5)
CHLORIDE SERPL-SCNC: 108 MMOL/L (ref 98–107)
CO2 SERPL-SCNC: 28 MMOL/L (ref 23–29)
CREAT SERPL-MCNC: 1.64 MG/DL (ref 0.7–1.3)
GFR SERPL CREATININE-BSD FRML MDRD: 30 ML/MIN/1.7M2
GLUCOSE SERPL-MCNC: 95 MG/DL (ref 70–105)
POTASSIUM SERPL-SCNC: 4.4 MMOL/L (ref 3.5–5.1)
SODIUM SERPL-SCNC: 142 MMOL/L (ref 136–145)

## 2017-12-15 PROCEDURE — 36415 COLL VENOUS BLD VENIPUNCTURE: CPT | Performed by: INTERNAL MEDICINE

## 2017-12-15 PROCEDURE — 80048 BASIC METABOLIC PNL TOTAL CA: CPT | Performed by: INTERNAL MEDICINE

## 2017-12-15 PROCEDURE — 99213 OFFICE O/P EST LOW 20 MIN: CPT | Performed by: INTERNAL MEDICINE

## 2017-12-15 RX ORDER — HYDROCHLOROTHIAZIDE 12.5 MG/1
12.5 CAPSULE ORAL DAILY
Qty: 90 CAPSULE | Refills: 3 | Status: SHIPPED | OUTPATIENT
Start: 2017-12-15

## 2017-12-15 RX ORDER — POLYETHYLENE GLYCOL 3350 17 G/17G
17 POWDER, FOR SOLUTION ORAL DAILY
COMMUNITY

## 2017-12-15 NOTE — PATIENT INSTRUCTIONS
If you notice more shortness of breath when you go walking or if lightheadedness when you walk, please let us know and we might cut the dose of your CARVEDILOL or your DILTIAZEM.    I would like to stop the ALDACTONE and restart the HYDROCHLOROTHIAZIDE.    If we find that the blood pressure isn't well controlled with that medication swap, then we will go back to the similar cousin of the ALDACTONE called EPLERENONE.    Would like you to get another BMP (basic metabolic panel) in one more week.    See Linda in another month.

## 2017-12-15 NOTE — LETTER
12/15/2017    [facility]  [address] [address] [address]    RE: Anastasiya Mota       Dear Colleague,    I had the pleasure of seeing Anastasiya Mota in the [facility].    Cardiology Progress Note          Assessment and Plan:     1. Dyspnea on exertion, mild.  May be related to chronotropic incompetence due to diltiazem and carvedilol that have continued after her SVT ablation.  Patient is in sinus rhythm on her last ECG.    If patient has any symptoms of progressive dyspnea on exertion or lightheadedness, would discontinue her diltiazem or reduce the dose.      2. Hypertension, adequate control currently    We will discontinue the spironolactone with her history of breast cancer on hormonal therapy and her elevated creatinine with reduced GFR.  Will add back the small dose of hydrochlorothiazide 12.5 mg's daily for her edema and blood pressure.    Recheck labs in 1 week through her care facility    Follow-up in 1 month with Linda Holbrook in [place], patient's daughter works in [place].      This note was transcribed using electronic voice recognition software and there may be typographical errors present.                Interval History:   The patient is a very pleasant 88 year old with history of breast cancer that I am meeting for the first time today.  She previously followed with electrophysiology for SVT status post ablation, and possible history of atrial flutter seen at [facility] and ablated at our institution.  She has history of bleeding and is not on anticoagulation.  She has had difficulty with hypertension and is on a multidrug regimen.  She has resting bradycardia and diltiazem plus carvedilol at pretty solid doses but denies any consistent exertional dyspnea or lightheadedness.  She feels that her dyspnea is not a problem at this time.    She was having lower extremity edema and hydrochlorothiazide and spironolactone were  "added.  The spironolactone has continued and the hydrochlorothiazide was discontinued due to some elevated creatinine.  Her creatinine remains elevated on office check today.  Her edema is better.                     Review of Systems:   Review of Systems:  Skin:  Negative     Eyes:  Positive for glasses  ENT:  Negative    Respiratory:  Positive for dyspnea on exertion  Cardiovascular:    Positive for;edema  Gastroenterology: Positive for reflux  Genitourinary:  Negative    Musculoskeletal:  Negative    Neurologic:  Positive for memory problems  Psychiatric:  Negative    Heme/Lymph/Imm:  Negative    Endocrine:  Negative                Physical Exam:     Vitals: /75  Pulse 54  Ht 1.626 m (5' 4\")  Wt 109 kg (240 lb 3.2 oz)  BMI 41.23 kg/m2  Constitutional:  cooperative, alert and oriented, well developed, well nourished, in no acute distress        Skin:  warm and dry to the touch, no apparent skin lesions or masses noted        Head:  normocephalic, no masses or lesions        Eyes:  pupils equal and round, conjunctivae and lids unremarkable, sclera white, no xanthalasma, EOMS intact, no nystagmus    , arcus senilis    ENT:  no pallor or cyanosis        Neck:  JVP normal        Chest:  normal breath sounds, clear to auscultation, normal A-P diameter, normal symmetry, normal respiratory excursion, no use of accessory muscles        Cardiac: regular rhythm;no murmurs, gallops or rubs detected bradycardic                Abdomen:    obese      Extremities and Back:  no deformities, clubbing, cyanosis, erythema observed     1+ pitting edema bilateral lower extremities    Neurological:  no gross motor deficits;affect appropriate                 Medications:     Current Outpatient Prescriptions   Medication Sig Dispense Refill     polyethylene glycol (MIRALAX/GLYCOLAX) powder Take 17 g by mouth daily       hydrochlorothiazide (MICROZIDE) 12.5 MG capsule Take 1 capsule (12.5 mg) by mouth daily 90 capsule 3     " diltiazem 240 MG 24 hr capsule Take 1 capsule (240 mg) by mouth daily       lisinopril (PRINIVIL/ZESTRIL) 20 MG tablet Take 1 tablet (20 mg) by mouth every evening 30 tablet 3     cloNIDine (CATAPRES) 0.1 MG tablet Take 0.1 mg by mouth 2 times daily as needed       melatonin 3 MG tablet Take 3 mg by mouth nightly as needed for sleep       aspirin 81 MG EC tablet Take 1 tablet (81 mg) by mouth daily 90 tablet 3     Ondansetron HCl (ZOFRAN PO) Take 4 mg by mouth every 8 hours as needed for nausea or vomiting       Sertraline HCl (ZOLOFT PO) Take 50 mg by mouth daily       carvedilol (COREG) 12.5 MG tablet Take 25 mg by mouth 2 times daily (with meals)  60 tablet 3     ferrous sulfate (IRON) 325 (65 FE) MG tablet Take 325 mg by mouth daily (with breakfast)        ANASTROZOLE PO Take 1 mg by mouth daily        LANSOPRAZOLE PO Take 30 mg by mouth every morning (before breakfast)        senna-docusate (SENEXON-S) 8.6-50 MG per tablet Take 2 tablets by mouth 2 times daily       atorvastatin (LIPITOR) 40 MG tablet Take 40 mg by mouth every evening        ACETAMINOPHEN PO Take 1,000 mg by mouth 3 times daily as needed                   Data:   All laboratory data reviewed  Results for orders placed or performed in visit on 12/15/17 (from the past 24 hour(s))   Basic metabolic panel   Result Value Ref Range    Sodium 142 136 - 145 mmol/L    Potassium 4.4 3.5 - 5.1 mmol/L    Chloride 108 (H) 98 - 107 mmol/L    Carbon Dioxide 28 23 - 29 mmol/L    Anion Gap 10.4 6 - 17 mmol/L    Glucose 95 70 - 105 mg/dL    Urea Nitrogen 28 7 - 30 mg/dL    Creatinine 1.64 (H) 0.70 - 1.30 mg/dL    GFR Estimate 30 (L) >60 mL/min/1.7m2    GFR Estimate If Black 36 (L) >60 mL/min/1.7m2    Calcium 9.1 8.5 - 10.5 mg/dL       All laboratory data reviewed  No results found for: CHOL  No results found for: HDL  No results found for: LDL  No results found for: TRIG  No results found for: CHOLHDLRATIO  TSH   Date Value Ref Range Status   12/27/2016 2.05  0.40 - 4.00 mU/L Final     Last Basic Metabolic Panel:  Lab Results   Component Value Date     12/15/2017      Lab Results   Component Value Date    POTASSIUM 4.4 12/15/2017     Lab Results   Component Value Date    CHLORIDE 108 12/15/2017     Lab Results   Component Value Date    RONALD 9.1 12/15/2017     Lab Results   Component Value Date    CO2 28 12/15/2017     Lab Results   Component Value Date    BUN 28 12/15/2017     Lab Results   Component Value Date    CR 1.64 12/15/2017     Lab Results   Component Value Date    GLC 95 12/15/2017     Lab Results   Component Value Date    WBC 6.1 04/07/2017     Lab Results   Component Value Date    RBC 4.35 04/07/2017     Lab Results   Component Value Date    HGB 11.6 04/07/2017     Lab Results   Component Value Date    HCT 38.9 04/07/2017     Lab Results   Component Value Date    MCV 89 04/07/2017     Lab Results   Component Value Date    MCH 26.7 04/07/2017     Lab Results   Component Value Date    MCHC 29.8 04/07/2017     Lab Results   Component Value Date    RDW 15.1 04/07/2017     Lab Results   Component Value Date     04/07/2017     Thank you for allowing me to participate in the care of your patient.    Sincerely,     Vinod Barajas MD     [facility]

## 2017-12-15 NOTE — MR AVS SNAPSHOT
After Visit Summary   12/15/2017    Anastasiya Mota    MRN: [id number]           Patient Information     Date Of Birth          5/1/1929        Visit Information        Provider Department      12/15/2017 2:15 PM Vinod Barajas MD [facility]        Today's Diagnoses     Benign essential hypertension    -  1    Lower extremity edema          Care Instructions    If you notice more shortness of breath when you go walking or if lightheadedness when you walk, please let us know and we might cut the dose of your CARVEDILOL or your DILTIAZEM.    I would like to stop the ALDACTONE and restart the HYDROCHLOROTHIAZIDE.    If we find that the blood pressure isn't well controlled with that medication swap, then we will go back to the similar cousin of the ALDACTONE called EPLERENONE.    Would like you to get another BMP (basic metabolic panel) in one more week.    See Ana in another month.                 Follow-ups after your visit        Additional Services     Follow-Up with Cardiologist                 Future tests that were ordered for you today     Open Future Orders        Priority Expected Expires Ordered    Follow-Up with Cardiologist Routine 1/14/2018 12/15/2018 12/15/2017            Who to contact     If you have questions or need follow up information about today's clinic visit or your schedule please contact [facility] directly at [phone].  Normal or non-critical lab and imaging results will be communicated to you by MyChart, letter or phone within 4 business days after the clinic has received the results. If you do not hear from us within 7 days, please contact the clinic through MyChart or phone. If you have a critical or abnormal lab result, we will notify you by phone as soon as possible.  Submit refill requests through [employer] or call your pharmacy and they will forward the refill request to us. Please  "allow 3 business days for your refill to be completed.          Additional Information About Your Visit        MyChart Information     [email or web address] lets you send messages to your doctor, view your test results, renew your prescriptions, schedule appointments and more. To sign up, go to [email or web address] . Click on \"Log in\" on the left side of the screen, which will take you to the Welcome page. Then click on \"Sign up Now\" on the right side of the page.     You will be asked to enter the access code listed below, as well as some personal information. Please follow the directions to create your username and password.     Your access code is: [email or web address]  Expires: 2018  3:06 PM     Your access code will  in 90 days. If you need help or a new code, please call your [facility] clinic or [phone].        Care EveryWhere ID     This is your Care EveryWhere ID. This could be used by other organizations to access your [facility] medical records  [id number]        Your Vitals Were     Pulse Height BMI (Body Mass Index)             54 1.626 m (5' 4\") 41.23 kg/m2          Blood Pressure from Last 3 Encounters:   12/15/17 128/75   17 110/60   10/19/17 132/58    Weight from Last 3 Encounters:   12/15/17 109 kg (240 lb 3.2 oz)   17 108 kg (238 lb)   10/19/17 112 kg (247 lb)              We Performed the Following     Follow-Up with Cardiologist          Today's Medication Changes          These changes are accurate as of: 12/15/17  2:48 PM.  If you have any questions, ask your nurse or doctor.               Start taking these medicines.        Dose/Directions    hydrochlorothiazide 12.5 MG capsule   Commonly known as:  MICROZIDE   Used for:  Lower extremity edema, Benign essential hypertension   Started by:  Vinod Barajas MD        Dose:  12.5 mg   Take 1 capsule (12.5 mg) by mouth daily   Quantity:  90 capsule   Refills:  3         Stop taking these medicines if you haven't already. Please " contact your care team if you have questions.     spironolactone 25 MG tablet   Commonly known as:  ALDACTONE   Stopped by:  Vinod Barajas MD                Where to get your medicines      These medications were sent to [facility], [place] - [address]  [address], [facility] [address]     Phone:  [phone]     hydrochlorothiazide 12.5 MG capsule                Primary Care Provider Office Phone # Fax #    [facility] [phone] [address] [address] [address]        Equal Access to Services     [facility]: Hadii aad ku hadasho Soomaali, waaxda luqadaha, qaybta kaalmada adeegyada, waxay idiin hayaan adetyrell oneill . So [facility] [phone].    ATENCIÓN: Si habla español, tiene a vasquez disposición servicios gratuitos de asistencia lingüística. Llame al [phone].    We comply with applicable federal civil rights laws and [place] laws. We do not discriminate on the basis of race, color, national origin, age, disability, sex, sexual orientation, or gender identity.            Thank you!     Thank you for choosing [facility]  for your care. Our goal is always to provide you with excellent care. Hearing back from our patients is one way we can continue to improve our services. Please take a few minutes to complete the written survey that you may receive in the mail after your visit with us. Thank you!             Your Updated Medication List - Protect others around you: Learn how to safely use, store and throw away your medicines at www.disposemymeds.org.          This list is accurate as of: 12/15/17  2:48 PM.  Always use your most recent med list.                   Brand Name Dispense Instructions for use Diagnosis    ACETAMINOPHEN PO      Take 1,000 mg by mouth 3 times daily as needed        ANASTROZOLE PO      Take 1 mg by mouth daily         aspirin 81 MG EC tablet     90 tablet    Take 1 tablet (81 mg) by mouth daily    CVA (cerebral vascular accident) (H)       atorvastatin 40 MG tablet    LIPITOR     Take 40 mg by mouth every evening        carvedilol 12.5 MG tablet    COREG    60 tablet    Take 25 mg by mouth 2 times daily (with meals)    Atrial flutter, unspecified type (H), SVT (supraventricular tachycardia) (H)       cloNIDine 0.1 MG tablet    CATAPRES     Take 0.1 mg by mouth 2 times daily as needed        diltiazem 240 MG 24 hr capsule      Take 1 capsule (240 mg) by mouth daily    Atrial flutter, unspecified type (H), SVT (supraventricular tachycardia) (H)       ferrous sulfate 325 (65 FE) MG tablet    IRON     Take 325 mg by mouth daily (with breakfast)        hydrochlorothiazide 12.5 MG capsule    MICROZIDE    90 capsule    Take 1 capsule (12.5 mg) by mouth daily    Lower extremity edema, Benign essential hypertension       LANSOPRAZOLE PO      Take 30 mg by mouth every morning (before breakfast)        lisinopril 20 MG tablet    PRINIVIL/ZESTRIL    30 tablet    Take 1 tablet (20 mg) by mouth every evening    Benign essential hypertension       melatonin 3 MG tablet      Take 3 mg by mouth nightly as needed for sleep        polyethylene glycol powder    MIRALAX/GLYCOLAX     Take 17 g by mouth daily        SENEXON-S 8.6-50 MG per tablet   Generic drug:  senna-docusate      Take 2 tablets by mouth 2 times daily        ZOFRAN PO      Take 4 mg by mouth every 8 hours as needed for nausea or vomiting        ZOLOFT PO      Take 50 mg by mouth daily

## 2017-12-15 NOTE — PROGRESS NOTES
Cardiology Progress Note          Assessment and Plan:     1. Dyspnea on exertion, mild.  May be related to chronotropic incompetence due to diltiazem and carvedilol that have continued after her SVT ablation.  Patient is in sinus rhythm on her last ECG.    If patient has any symptoms of progressive dyspnea on exertion or lightheadedness, would discontinue her diltiazem or reduce the dose.      2. Hypertension, adequate control currently    We will discontinue the spironolactone with her history of breast cancer on hormonal therapy and her elevated creatinine with reduced GFR.  Will add back the small dose of hydrochlorothiazide 12.5 mg's daily for her edema and blood pressure.    Recheck labs in 1 week through her care facility    Follow-up in 1 month with Linda Holbrook in [place], patient's daughter works in [place].      This note was transcribed using electronic voice recognition software and there may be typographical errors present.                Interval History:   The patient is a very pleasant 88 year old with history of breast cancer that I am meeting for the first time today.  She previously followed with electrophysiology for SVT status post ablation, and possible history of atrial flutter seen at [facility] and ablated at our institution.  She has history of bleeding and is not on anticoagulation.  She has had difficulty with hypertension and is on a multidrug regimen.  She has resting bradycardia and diltiazem plus carvedilol at pretty solid doses but denies any consistent exertional dyspnea or lightheadedness.  She feels that her dyspnea is not a problem at this time.    She was having lower extremity edema and hydrochlorothiazide and spironolactone were added.  The spironolactone has continued and the hydrochlorothiazide was discontinued due to some elevated creatinine.  Her creatinine remains elevated on office check today.  Her edema is better.                     Review of Systems:   Review  "of Systems:  Skin:  Negative     Eyes:  Positive for glasses  ENT:  Negative    Respiratory:  Positive for dyspnea on exertion  Cardiovascular:    Positive for;edema  Gastroenterology: Positive for reflux  Genitourinary:  Negative    Musculoskeletal:  Negative    Neurologic:  Positive for memory problems  Psychiatric:  Negative    Heme/Lymph/Imm:  Negative    Endocrine:  Negative                Physical Exam:     Vitals: /75  Pulse 54  Ht 1.626 m (5' 4\")  Wt 109 kg (240 lb 3.2 oz)  BMI 41.23 kg/m2  Constitutional:  cooperative, alert and oriented, well developed, well nourished, in no acute distress        Skin:  warm and dry to the touch, no apparent skin lesions or masses noted        Head:  normocephalic, no masses or lesions        Eyes:  pupils equal and round, conjunctivae and lids unremarkable, sclera white, no xanthalasma, EOMS intact, no nystagmus    , arcus senilis    ENT:  no pallor or cyanosis        Neck:  JVP normal        Chest:  normal breath sounds, clear to auscultation, normal A-P diameter, normal symmetry, normal respiratory excursion, no use of accessory muscles        Cardiac: regular rhythm;no murmurs, gallops or rubs detected bradycardic                Abdomen:    obese      Extremities and Back:  no deformities, clubbing, cyanosis, erythema observed     1+ pitting edema bilateral lower extremities    Neurological:  no gross motor deficits;affect appropriate                 Medications:     Current Outpatient Prescriptions   Medication Sig Dispense Refill     polyethylene glycol (MIRALAX/GLYCOLAX) powder Take 17 g by mouth daily       hydrochlorothiazide (MICROZIDE) 12.5 MG capsule Take 1 capsule (12.5 mg) by mouth daily 90 capsule 3     diltiazem 240 MG 24 hr capsule Take 1 capsule (240 mg) by mouth daily       lisinopril (PRINIVIL/ZESTRIL) 20 MG tablet Take 1 tablet (20 mg) by mouth every evening 30 tablet 3     cloNIDine (CATAPRES) 0.1 MG tablet Take 0.1 mg by mouth 2 times daily " as needed       melatonin 3 MG tablet Take 3 mg by mouth nightly as needed for sleep       aspirin 81 MG EC tablet Take 1 tablet (81 mg) by mouth daily 90 tablet 3     Ondansetron HCl (ZOFRAN PO) Take 4 mg by mouth every 8 hours as needed for nausea or vomiting       Sertraline HCl (ZOLOFT PO) Take 50 mg by mouth daily       carvedilol (COREG) 12.5 MG tablet Take 25 mg by mouth 2 times daily (with meals)  60 tablet 3     ferrous sulfate (IRON) 325 (65 FE) MG tablet Take 325 mg by mouth daily (with breakfast)        ANASTROZOLE PO Take 1 mg by mouth daily        LANSOPRAZOLE PO Take 30 mg by mouth every morning (before breakfast)        senna-docusate (SENEXON-S) 8.6-50 MG per tablet Take 2 tablets by mouth 2 times daily       atorvastatin (LIPITOR) 40 MG tablet Take 40 mg by mouth every evening        ACETAMINOPHEN PO Take 1,000 mg by mouth 3 times daily as needed                   Data:   All laboratory data reviewed  Results for orders placed or performed in visit on 12/15/17 (from the past 24 hour(s))   Basic metabolic panel   Result Value Ref Range    Sodium 142 136 - 145 mmol/L    Potassium 4.4 3.5 - 5.1 mmol/L    Chloride 108 (H) 98 - 107 mmol/L    Carbon Dioxide 28 23 - 29 mmol/L    Anion Gap 10.4 6 - 17 mmol/L    Glucose 95 70 - 105 mg/dL    Urea Nitrogen 28 7 - 30 mg/dL    Creatinine 1.64 (H) 0.70 - 1.30 mg/dL    GFR Estimate 30 (L) >60 mL/min/1.7m2    GFR Estimate If Black 36 (L) >60 mL/min/1.7m2    Calcium 9.1 8.5 - 10.5 mg/dL       All laboratory data reviewed  No results found for: CHOL  No results found for: HDL  No results found for: LDL  No results found for: TRIG  No results found for: CHOLHDLRATIO  TSH   Date Value Ref Range Status   12/27/2016 2.05 0.40 - 4.00 mU/L Final     Last Basic Metabolic Panel:  Lab Results   Component Value Date     12/15/2017      Lab Results   Component Value Date    POTASSIUM 4.4 12/15/2017     Lab Results   Component Value Date    CHLORIDE 108 12/15/2017     Lab  Results   Component Value Date    RONALD 9.1 12/15/2017     Lab Results   Component Value Date    CO2 28 12/15/2017     Lab Results   Component Value Date    BUN 28 12/15/2017     Lab Results   Component Value Date    CR 1.64 12/15/2017     Lab Results   Component Value Date    GLC 95 12/15/2017     Lab Results   Component Value Date    WBC 6.1 04/07/2017     Lab Results   Component Value Date    RBC 4.35 04/07/2017     Lab Results   Component Value Date    HGB 11.6 04/07/2017     Lab Results   Component Value Date    HCT 38.9 04/07/2017     Lab Results   Component Value Date    MCV 89 04/07/2017     Lab Results   Component Value Date    MCH 26.7 04/07/2017     Lab Results   Component Value Date    MCHC 29.8 04/07/2017     Lab Results   Component Value Date    RDW 15.1 04/07/2017     Lab Results   Component Value Date     04/07/2017

## 2018-01-18 ENCOUNTER — TELEPHONE (OUTPATIENT)
Dept: CARDIOLOGY | Facility: CLINIC | Age: 83
End: 2018-01-18

## 2018-01-18 NOTE — TELEPHONE ENCOUNTER
Shayla TSANG with [facility] called to see if writer would fax the AVS from patient's last OV with Dr. Barajas (12/15/17) to show the medication changes that were made. Dr. Barajas stopped spironolactone and started hydrochlorothiazide. She stated they needed the documentation to show the start and stop of the medications, as well as the order for the BMP.  Faxed OV note, AVS, and written/signed prescription from Dr. Barajas with the medication changes to [phone].      Verified these changes were made and she stated the hydrochlorothiazide was added but the spironolactone was not stopped.  Patient's vitals are stable and a BMP was completed at patient's PMD.     1/16/18 BMP (in care everywhere)    K 4.3    CO2 25  Anion Gap 7  Ca 9.1  BUN 26  Cr 1.61  GFR 28  GFR if black 33    Compared to 12/15/17 BMP Cr improved slightly but GFR worsened.    Results for LEVY MG CHISHOLM (MRN [id number]) as of 1/18/2018 13:42   Ref. Range 12/15/2017 13:31   Sodium Latest Ref Range: 136 - 145 mmol/L 142   Potassium Latest Ref Range: 3.5 - 5.1 mmol/L 4.4   Chloride Latest Ref Range: 98 - 107 mmol/L 108 (H)   Carbon Dioxide Latest Ref Range: 23 - 29 mmol/L 28   Urea Nitrogen Latest Ref Range: 7 - 30 mg/dL 28   Creatinine Latest Ref Range: 0.70 - 1.30 mg/dL 1.64 (H)   GFR Estimate Latest Ref Range: >60 mL/min/1.7m2 30 (L)   GFR Estimate If Black Latest Ref Range: >60 mL/min/1.7m2 36 (L)   Calcium Latest Ref Range: 8.5 - 10.5 mg/dL 9.1   Anion Gap Latest Ref Range: 6 - 17 mmol/L 10.4   Glucose Latest Ref Range: 70 - 105 mg/dL 95     Will route to Dr. Barajas to review. Patient has f/u 1/31/18 with Linda

## 2018-01-22 ENCOUNTER — TRANSFERRED RECORDS (OUTPATIENT)
Dept: HEALTH INFORMATION MANAGEMENT | Facility: CLINIC | Age: 83
End: 2018-01-22

## 2018-01-22 ENCOUNTER — RECORDS - HEALTHEAST (OUTPATIENT)
Dept: LAB | Facility: CLINIC | Age: 83
End: 2018-01-22

## 2018-01-22 LAB
ANION GAP SERPL CALCULATED.3IONS-SCNC: 5 MMOL/L
ANION GAP SERPL CALCULATED.3IONS-SCNC: 5 MMOL/L (ref 5–18)
BUN SERPL-MCNC: 31 MG/DL
BUN SERPL-MCNC: 31 MG/DL (ref 8–28)
CALCIUM SERPL-MCNC: 8.7 MG/DL
CALCIUM SERPL-MCNC: 8.7 MG/DL (ref 8.5–10.5)
CHLORIDE BLD-SCNC: 111 MMOL/L (ref 98–107)
CHLORIDE SERPLBLD-SCNC: 111 MMOL/L
CO2 SERPL-SCNC: 25 MMOL/L
CO2 SERPL-SCNC: 25 MMOL/L (ref 22–31)
CREAT SERPL-MCNC: 1.52 MG/DL
CREAT SERPL-MCNC: 1.52 MG/DL (ref 0.6–1.1)
GFR SERPL CREATININE-BSD FRML MDRD: 32 ML/MIN/1.73M2
GFR SERPL CREATININE-BSD FRML MDRD: 39 ML/MIN/1.73M2
GLUCOSE BLD-MCNC: 65 MG/DL (ref 70–125)
GLUCOSE SERPL-MCNC: 65 MG/DL (ref 70–99)
POTASSIUM BLD-SCNC: 5.1 MMOL/L (ref 3.5–5)
POTASSIUM SERPL-SCNC: 5.1 MMOL/L
SODIUM SERPL-SCNC: 141 MMOL/L
SODIUM SERPL-SCNC: 141 MMOL/L (ref 136–145)

## 2018-01-31 ENCOUNTER — OFFICE VISIT (OUTPATIENT)
Dept: CARDIOLOGY | Facility: CLINIC | Age: 83
End: 2018-01-31
Attending: INTERNAL MEDICINE
Payer: MEDICARE

## 2018-01-31 VITALS
HEIGHT: 64 IN | BODY MASS INDEX: 40.8 KG/M2 | DIASTOLIC BLOOD PRESSURE: 60 MMHG | OXYGEN SATURATION: 96 % | HEART RATE: 60 BPM | WEIGHT: 239 LBS | SYSTOLIC BLOOD PRESSURE: 110 MMHG

## 2018-01-31 DIAGNOSIS — I10 BENIGN ESSENTIAL HYPERTENSION: ICD-10-CM

## 2018-01-31 DIAGNOSIS — R60.0 LOWER EXTREMITY EDEMA: ICD-10-CM

## 2018-01-31 DIAGNOSIS — I48.91 ATRIAL FIBRILLATION (H): Primary | ICD-10-CM

## 2018-01-31 PROCEDURE — 99214 OFFICE O/P EST MOD 30 MIN: CPT | Performed by: PHYSICIAN ASSISTANT

## 2018-01-31 NOTE — PROGRESS NOTES
Cardiology Progress Note    Date of Service: 01/31/2018      Patient seen today in follow up of: hypertension     Primary cardiologist: Seen previously by EP only--Dr. Peterson.  Now established with Dr. Barajas.      HPI:  I had the pleasure of following up with Anastasiya Mota along with her daughter today in the cardiology clinic.     Anastasiya is a pleasant 88 year old female with a PMHx of pSVT and aflutter, dating back to 2015 after a prolonged hospitalization at [facility] for a GI bleed, at which time she had a NSTEMI, ASAD, and developed tachycardia, felt to be SVT/aflutter. She was then admitted at [facility] in December 2016, noted to be tachycardic once again, placed on diltiazem, and subsequently amiodarone after being seen by EP. In February of 2017 her amio was discontinued, and she underwent a successful SVT ablation in March 2017. She was seen back in follow up by EP in April, and was doing well, with no further tachyarrythmias on event monitor. Her AC was ultimately stopped once again due to GI bleed concerns and she is now only on an ASA. She was then to be followed on a PRN basis.      Of note, her last echo was in Dec 2016, and at that time her EF was 60-65% with no regional wall motion abnormalities. No significant valvular abnormalities noted.     Due to hypertension at her assisted living facility (SBPs 153-170), she saw Nhung Smith NP 10/19/17.  At that visit, she was on Lisinopril 30mg daily, Coreg 25mg BID, HCTZ 25mg daily, and clonidine PRN. Also on diltiazem. She noted some QUINTANILLA and some edema. Nhung added in spironolactone 12.5 mg daily. A BMP drawn about 2 weeks later (10/30/17) showed the creat had gone from 1.31 to 1.57. Due to better controlled BPs and the renal dysfunction, her lisinopril was decreased. Renal function remained elevated, so HCTZ decreased and held. She saw Dr. Barajas 12/15/17. Due to her renal function and her history of breast cancer on hormonal therapy, spironolactone was  discontinued and HCTZ 12.5 mg was added back. the facility did add back HCTZ but did not initially stop the spironolactone.  BMP showed BUN WNL and creat elevated but stable at 1.6.     They present today for follow up. She has now stopped the spironolactone. She has some mild edema, but much better in general. She has no chest pain. When she gets ready for bed she has some dyspnea, but none when she walks. In fact, her daughter said she had to keep up with her walking down the hallway today getting to the appointment. Her BPs are much better controlled.   No major complaints or concerns.    ASSESSMENT/PLAN:    1.  Hypertension.   --Reportedly somewhat difficult to control in the past as well.    --Now controlled on lisinopril 20 mg QHS, HCTZ 12.5 mg, diltiazem 240mg, Coreg 12.5 mg BID, clonidine 0.1 mg BID PRN.     -- She will follow up with BMP in 2 months.      2.  Hx of SVT/aflutter, s/p ablation.    --Clinically do not suspect recurrence of tachyarrythmia.    --Chronically remains on diltiazem and Coreg.     --Given hx of GIB she remains off full AC, and on ASA only.      3.  Mild QUINTANILLA.    -- QUINTANILLA is not limiting.    -- If progressive sxs of QUINTANILLA, could decrease dilt dose given her sinus rates in the upper 50s and low 60s.     4.  Edema.    --Minimal edema now.   --BUN improved and creat elevated but stable (on HCTZ and spironolactone), now off spironolactone.    --Follow up in 2 months with BMP. If needed, we can add in eplerenone. Could also consider decreasing diltiazem.         Follow up plan:  Follow up 2 months with BMP, sooner if increased QUINTANILLA or edema.       Orders this Visit:  Orders Placed This Encounter   Procedures     Basic metabolic panel     Follow-Up with Cardiac Advanced Practice Provider     No orders of the defined types were placed in this encounter.    There are no discontinued medications.      CURRENT MEDICATIONS:  Current Outpatient Prescriptions   Medication Sig Dispense Refill      polyethylene glycol (MIRALAX/GLYCOLAX) powder Take 17 g by mouth daily       hydrochlorothiazide (MICROZIDE) 12.5 MG capsule Take 1 capsule (12.5 mg) by mouth daily 90 capsule 3     diltiazem 240 MG 24 hr capsule Take 1 capsule (240 mg) by mouth daily       lisinopril (PRINIVIL/ZESTRIL) 20 MG tablet Take 1 tablet (20 mg) by mouth every evening 30 tablet 3     cloNIDine (CATAPRES) 0.1 MG tablet Take 0.1 mg by mouth 2 times daily as needed       melatonin 3 MG tablet Take 3 mg by mouth nightly as needed for sleep       aspirin 81 MG EC tablet Take 1 tablet (81 mg) by mouth daily 90 tablet 3     Ondansetron HCl (ZOFRAN PO) Take 4 mg by mouth every 8 hours as needed for nausea or vomiting       Sertraline HCl (ZOLOFT PO) Take 50 mg by mouth daily       carvedilol (COREG) 12.5 MG tablet Take 25 mg by mouth 2 times daily (with meals)  60 tablet 3     ferrous sulfate (IRON) 325 (65 FE) MG tablet Take 325 mg by mouth daily (with breakfast)        ANASTROZOLE PO Take 1 mg by mouth daily        LANSOPRAZOLE PO Take 30 mg by mouth every morning (before breakfast)        senna-docusate (SENEXON-S) 8.6-50 MG per tablet Take 2 tablets by mouth 2 times daily       atorvastatin (LIPITOR) 40 MG tablet Take 40 mg by mouth every evening        ACETAMINOPHEN PO Take 1,000 mg by mouth 3 times daily as needed          ALLERGIES   No Known Allergies    PAST MEDICAL HISTORY:  Past Medical History:   Diagnosis Date     Acid reflux      Atrial fibrillation (H)      Breast cancer (H)      Cancer (H)      Cerebral infarction (H)      Constipation      Elevated cholesterol      Hypertension      Nausea      Paroxysmal supraventricular tachycardia (H)      SVT (supraventricular tachycardia) (H)        PAST SURGICAL HISTORY:  No past surgical history on file.    FAMILY HISTORY:  Family History   Problem Relation Age of Onset     Unknown/Adopted No family hx of        SOCIAL HISTORY:  Social History     Social History     Marital status:   "    Spouse name: N/A     Number of children: N/A     Years of education: N/A     Social History Main Topics     Smoking status: Never Smoker     Smokeless tobacco: Never Used     Alcohol use No     Drug use: None     Sexual activity: Not Asked     Other Topics Concern     Caffeine Concern No     Special Diet No     regular diet      Exercise No     Social History Narrative       Review of Systems:  Cardiovascular: negative for chest pain, palpitations, orthopnea, PND, + mild LE edema, + orthostasis at noc  Constitutional: negative for chills, sweats, fever, unintentional weight loss.    Resp: Negative for dyspnea at rest, + for mild dyspnea on exertion, neg for productive cough, wheezing, hemoptysis.   HEENT: Negative for visual changes, sinus congestion  Gastrointestinal: negative for abdominal pain, constipation, diarrhea, blood in stool, vomiting  Hematologic/lymphatic: negative for current blood thinners, hx of blood clots  Musculoskeletal: negative for new back pain, falls, neck pain  Neurological: negative for focal weakness, LOC, seizures      Physical Exam:  Vitals: /60 (BP Location: Right arm, Patient Position: Sitting, Cuff Size: Adult Large)  Pulse 60  Ht 1.626 m (5' 4\")  Wt 108.4 kg (239 lb)  SpO2 96%  BMI 41.02 kg/m2   Wt Readings from Last 4 Encounters:   01/31/18 108.4 kg (239 lb)   12/15/17 109 kg (240 lb 3.2 oz)   11/03/17 108 kg (238 lb)   10/19/17 112 kg (247 lb)       GEN:  In general, this is an overweight AAF in no acute distress on RA. She is a poor historian. Patient ambulatory with use of rolling walker.   HEENT:  Pupils equal, round. Sclerae nonicteric. Clear oropharynx. Mucous membranes moist.  NECK: Supple, no masses appreciated. Trachea midline. No appreciable JVD, sitting upright.   C/V:  Regular rate and rhythm, with intermittent extra beat. No murmur, rub or gallop. No S3 or RV heave. Mild/trace edema.   RESP: Respirations are unlabored. No use of accessory muscles. " CTAB  GI: Abdomen obese, soft, nontender, nondistended.   EXTREM: no edema. No cyanosis or clubbing.  NEURO: Alert and oriented, cooperative. Gait slow, using rolling walker. No obvious focal deficits.  SKIN: Warm and dry. No rashes or petechiae appreciated.       Recent Lab Results:    Outside labs:     1/16/18 BMP (in care everywhere)    K 4.3    CO2 25  Anion Gap 7  Ca 9.1  BUN 26  Cr 1.61  GFR 28  GFR if black 33      New imaging:  None    Linda Holbrook PA-C

## 2018-01-31 NOTE — MR AVS SNAPSHOT
After Visit Summary   1/31/2018    Anastasiya Mota    MRN: [id number]           Patient Information     Date Of Birth          5/1/1929        Visit Information        Provider Department      1/31/2018 1:00 PM Linda Holbrook PA-C [facility]        Today's Diagnoses     Lower extremity edema        Benign essential hypertension          Care Instructions    If you notice more shortness of breath when you go walking or if lightheadedness when you walk, please let us know and we might cut the dose of your DILTIAZEM.    If more swelling in the legs, please let us know.     Keep working on a low sodium diet and elevating the legs when you can.  You can use compression socks.     See me in 2 months with a lab, sooner if questions or concerns.                 Follow-ups after your visit        Additional Services     Follow-Up with Cardiac Advanced Practice Provider                 Your next 10 appointments already scheduled     Apr 04, 2018  2:00 PM CDT   LAB with RU LAB   [facility] [place] ([facility])    [id number] [place] [address] [address]  [facility] [address]   [phone]           Please do not eat 10-12 hours before your appointment if you are coming in fasting for labs on lipids, cholesterol, or glucose (sugar). This does not apply to pregnant women. Water, hot tea and black coffee (with nothing added) are okay. Do not drink other fluids, diet soda or chew gum.            Apr 04, 2018  3:00 PM CDT   Return Visit with Linda Holbrook PA-C   [facility] ([facility])    [address] [facility] [facility] [address]  [facility] [address]   [phone]              Future tests that were ordered for you today     Open Future Orders        Priority Expected Expires Ordered    Basic metabolic panel Routine 4/2/2018 1/31/2019 1/31/2018    Follow-Up with Cardiac Advanced Practice  "Provider Routine 2018            Who to contact     If you have questions or need follow up information about today's clinic visit or your schedule please contact [facility] directly at [phone].  Normal or non-critical lab and imaging results will be communicated to you by MyChart, letter or phone within 4 business days after the clinic has received the results. If you do not hear from us within 7 days, please contact the clinic through MyChart or phone. If you have a critical or abnormal lab result, we will notify you by phone as soon as possible.  Submit refill requests through [employer] or call your pharmacy and they will forward the refill request to us. Please allow 3 business days for your refill to be completed.          Additional Information About Your Visit        MyChart Information     [facility] lets you send messages to your doctor, view your test results, renew your prescriptions, schedule appointments and more. To sign up, go to [email or web address] . Click on \"Log in\" on the left side of the screen, which will take you to the Welcome page. Then click on \"Sign up Now\" on the right side of the page.     You will be asked to enter the access code listed below, as well as some personal information. Please follow the directions to create your username and password.     Your access code is: [email or web address]  Expires: 2018  1:35 PM     Your access code will  in 90 days. If you need help or a new code, please call your [facility] clinic or [phone].        Care EveryWhere ID     This is your Care EveryWhere ID. This could be used by other organizations to access your [facility] medical records  [id number]        Your Vitals Were     Pulse Height Pulse Oximetry BMI (Body Mass Index)          60 1.626 m (5' 4\") 96% 41.02 kg/m2         Blood Pressure from Last 3 Encounters:   18 110/60   12/15/17 128/75   17 110/60    " Weight from Last 3 Encounters:   01/31/18 108.4 kg (239 lb)   12/15/17 109 kg (240 lb 3.2 oz)   11/03/17 108 kg (238 lb)              We Performed the Following     Follow-Up with Cardiologist        Primary Care Provider Office Phone # Fax #    [facility] [phone] [address] [facility] [address]        Equal Access to Services     LARA OLIVEIRA : Hadii aad ku hadasho Soomaali, waaxda luqadaha, qaybta kaalmada adeegyada, waxay idiin hayaan adeeg rajeshsamuelsh laambar . So [facility] [place] [phone].    ATENCIÓN: Si habla espcatalina, tiene a vasquez disposición servicios gratuitos de asistencia lingüística. Llame al [phone].    We comply with applicable federal civil rights laws and [place] laws. We do not discriminate on the basis of race, color, national origin, age, disability, sex, sexual orientation, or gender identity.            Thank you!     Thank you for choosing [facility]  for your care. Our goal is always to provide you with excellent care. Hearing back from our patients is one way we can continue to improve our services. Please take a few minutes to complete the written survey that you may receive in the mail after your visit with us. Thank you!             Your Updated Medication List - Protect others around you: Learn how to safely use, store and throw away your medicines at www.disposemymeds.org.          This list is accurate as of 1/31/18  1:35 PM.  Always use your most recent med list.                   Brand Name Dispense Instructions for use Diagnosis    ACETAMINOPHEN PO      Take 1,000 mg by mouth 3 times daily as needed        ANASTROZOLE PO      Take 1 mg by mouth daily        aspirin 81 MG EC tablet     90 tablet    Take 1 tablet (81 mg) by mouth daily    CVA (cerebral vascular accident) (H)       atorvastatin 40 MG tablet    LIPITOR     Take 40 mg by mouth every evening        carvedilol 12.5 MG tablet     COREG    60 tablet    Take 25 mg by mouth 2 times daily (with meals)    Atrial flutter, unspecified type (H), SVT (supraventricular tachycardia) (H)       cloNIDine 0.1 MG tablet    CATAPRES     Take 0.1 mg by mouth 2 times daily as needed        diltiazem 240 MG 24 hr capsule      Take 1 capsule (240 mg) by mouth daily    Atrial flutter, unspecified type (H), SVT (supraventricular tachycardia) (H)       ferrous sulfate 325 (65 FE) MG tablet    IRON     Take 325 mg by mouth daily (with breakfast)        hydrochlorothiazide 12.5 MG capsule    MICROZIDE    90 capsule    Take 1 capsule (12.5 mg) by mouth daily    Lower extremity edema, Benign essential hypertension       LANSOPRAZOLE PO      Take 30 mg by mouth every morning (before breakfast)        lisinopril 20 MG tablet    PRINIVIL/ZESTRIL    30 tablet    Take 1 tablet (20 mg) by mouth every evening    Benign essential hypertension       melatonin 3 MG tablet      Take 3 mg by mouth nightly as needed for sleep        polyethylene glycol powder    MIRALAX/GLYCOLAX     Take 17 g by mouth daily        SENEXON-S 8.6-50 MG per tablet   Generic drug:  senna-docusate      Take 2 tablets by mouth 2 times daily        ZOFRAN PO      Take 4 mg by mouth every 8 hours as needed for nausea or vomiting        ZOLOFT PO      Take 50 mg by mouth daily

## 2018-01-31 NOTE — PATIENT INSTRUCTIONS
If you notice more shortness of breath when you go walking or if lightheadedness when you walk, please let us know and we might cut the dose of your DILTIAZEM.    If more swelling in the legs, please let us know.     Keep working on a low sodium diet and elevating the legs when you can.  You can use compression socks.     See me in 2 months with a lab, sooner if questions or concerns.

## 2018-01-31 NOTE — LETTER
1/31/2018    [facility]  [address] [facility] [facility] [address]    RE: Anastasiya Billte       Dear Colleague,    I had the pleasure of seeing Anastasiya Mota in the [facility].    Cardiology Progress Note    Date of Service: 01/31/2018      Patient seen today in follow up of: hypertension     Primary cardiologist: Seen previously by EP only--Dr. Peterson.  Now established with Dr. Barajas.      HPI:  I had the pleasure of following up with Anastasiya Mota along with her daughter today in the cardiology clinic.     Anastasiya is a pleasant 88 year old female with a PMHx of pSVT and aflutter, dating back to 2015 after a prolonged hospitalization at [facility] for a GI bleed, at which time she had a NSTEMI, ASAD, and developed tachycardia, felt to be SVT/aflutter. She was then admitted at [facility] in December 2016, noted to be tachycardic once again, placed on diltiazem, and subsequently amiodarone after being seen by EP. In February of 2017 her amio was discontinued, and she underwent a successful SVT ablation in March 2017. She was seen back in follow up by EP in April, and was doing well, with no further tachyarrythmias on event monitor. Her AC was ultimately stopped once again due to GI bleed concerns and she is now only on an ASA. She was then to be followed on a PRN basis.      Of note, her last echo was in Dec 2016, and at that time her EF was 60-65% with no regional wall motion abnormalities. No significant valvular abnormalities noted.     Due to hypertension at her assisted living facility (SBPs 153-170), she saw Nhung Smith NP 10/19/17.  At that visit, she was on Lisinopril 30mg daily, Coreg 25mg BID, HCTZ 25mg daily, and clonidine PRN. Also on diltiazem. She noted some QUINTANILLA and some edema. Nhung added in spironolactone 12.5 mg daily. A BMP drawn about 2 weeks later (10/30/17) showed the creat had gone from 1.31 to 1.57. Due to better controlled BPs and the renal  dysfunction, her lisinopril was decreased. Renal function remained elevated, so HCTZ decreased and held. She saw Dr. Barajas 12/15/17. Due to her renal function and her history of breast cancer on hormonal therapy, spironolactone was discontinued and HCTZ 12.5 mg was added back. the facility did add back HCTZ but did not initially stop the spironolactone.  BMP showed BUN WNL and creat elevated but stable at 1.6.     They present today for follow up. She has now stopped the spironolactone. She has some mild edema, but much better in general. She has no chest pain. When she gets ready for bed she has some dyspnea, but none when she walks. In fact, her daughter said she had to keep up with her walking down the hallway today getting to the appointment. Her BPs are much better controlled.   No major complaints or concerns.    ASSESSMENT/PLAN:    1.  Hypertension.   --Reportedly somewhat difficult to control in the past as well.    --Now controlled on lisinopril 20 mg QHS, HCTZ 12.5 mg, diltiazem 240mg, Coreg 12.5 mg BID, clonidine 0.1 mg BID PRN.     -- She will follow up with BMP in 2 months.      2.  Hx of SVT/aflutter, s/p ablation.    --Clinically do not suspect recurrence of tachyarrythmia.    --Chronically remains on diltiazem and Coreg.     --Given hx of GIB she remains off full AC, and on ASA only.      3.  Mild QUINTANILLA.    -- QUINTANILLA is not limiting.    -- If progressive sxs of QUINTANILLA, could decrease dilt dose given her sinus rates in the upper 50s and low 60s.     4.  Edema.    --Minimal edema now.   --BUN improved and creat elevated but stable (on HCTZ and spironolactone), now off spironolactone.    --Follow up in 2 months with BMP. If needed, we can add in eplerenone. Could also consider decreasing diltiazem.         Follow up plan:  Follow up 2 months with BMP, sooner if increased QUINTANILLA or edema.       Orders this Visit:  Orders Placed This Encounter   Procedures     Basic metabolic panel     Follow-Up with Cardiac Advanced  Practice Provider     No orders of the defined types were placed in this encounter.    There are no discontinued medications.      CURRENT MEDICATIONS:  Current Outpatient Prescriptions   Medication Sig Dispense Refill     polyethylene glycol (MIRALAX/GLYCOLAX) powder Take 17 g by mouth daily       hydrochlorothiazide (MICROZIDE) 12.5 MG capsule Take 1 capsule (12.5 mg) by mouth daily 90 capsule 3     diltiazem 240 MG 24 hr capsule Take 1 capsule (240 mg) by mouth daily       lisinopril (PRINIVIL/ZESTRIL) 20 MG tablet Take 1 tablet (20 mg) by mouth every evening 30 tablet 3     cloNIDine (CATAPRES) 0.1 MG tablet Take 0.1 mg by mouth 2 times daily as needed       melatonin 3 MG tablet Take 3 mg by mouth nightly as needed for sleep       aspirin 81 MG EC tablet Take 1 tablet (81 mg) by mouth daily 90 tablet 3     Ondansetron HCl (ZOFRAN PO) Take 4 mg by mouth every 8 hours as needed for nausea or vomiting       Sertraline HCl (ZOLOFT PO) Take 50 mg by mouth daily       carvedilol (COREG) 12.5 MG tablet Take 25 mg by mouth 2 times daily (with meals)  60 tablet 3     ferrous sulfate (IRON) 325 (65 FE) MG tablet Take 325 mg by mouth daily (with breakfast)        ANASTROZOLE PO Take 1 mg by mouth daily        LANSOPRAZOLE PO Take 30 mg by mouth every morning (before breakfast)        senna-docusate (SENEXON-S) 8.6-50 MG per tablet Take 2 tablets by mouth 2 times daily       atorvastatin (LIPITOR) 40 MG tablet Take 40 mg by mouth every evening        ACETAMINOPHEN PO Take 1,000 mg by mouth 3 times daily as needed          ALLERGIES   No Known Allergies    PAST MEDICAL HISTORY:  Past Medical History:   Diagnosis Date     Acid reflux      Atrial fibrillation (H)      Breast cancer (H)      Cancer (H)      Cerebral infarction (H)      Constipation      Elevated cholesterol      Hypertension      Nausea      Paroxysmal supraventricular tachycardia (H)      SVT (supraventricular tachycardia) (H)        PAST SURGICAL  "HISTORY:  No past surgical history on file.    FAMILY HISTORY:  Family History   Problem Relation Age of Onset     Unknown/Adopted No family hx of        SOCIAL HISTORY:  Social History     Social History     Marital status:      Spouse name: N/A     Number of children: N/A     Years of education: N/A     Social History Main Topics     Smoking status: Never Smoker     Smokeless tobacco: Never Used     Alcohol use No     Drug use: None     Sexual activity: Not Asked     Other Topics Concern     Caffeine Concern No     Special Diet No     regular diet      Exercise No     Social History Narrative       Review of Systems:  Cardiovascular: negative for chest pain, palpitations, orthopnea, PND, + mild LE edema, + orthostasis at noc  Constitutional: negative for chills, sweats, fever, unintentional weight loss.    Resp: Negative for dyspnea at rest, + for mild dyspnea on exertion, neg for productive cough, wheezing, hemoptysis.   HEENT: Negative for visual changes, sinus congestion  Gastrointestinal: negative for abdominal pain, constipation, diarrhea, blood in stool, vomiting  Hematologic/lymphatic: negative for current blood thinners, hx of blood clots  Musculoskeletal: negative for new back pain, falls, neck pain  Neurological: negative for focal weakness, LOC, seizures      Physical Exam:  Vitals: /60 (BP Location: Right arm, Patient Position: Sitting, Cuff Size: Adult Large)  Pulse 60  Ht 1.626 m (5' 4\")  Wt 108.4 kg (239 lb)  SpO2 96%  BMI 41.02 kg/m2   Wt Readings from Last 4 Encounters:   01/31/18 108.4 kg (239 lb)   12/15/17 109 kg (240 lb 3.2 oz)   11/03/17 108 kg (238 lb)   10/19/17 112 kg (247 lb)       GEN:  In general, this is an overweight AAF in no acute distress on RA. She is a poor historian. Patient ambulatory with use of rolling walker.   HEENT:  Pupils equal, round. Sclerae nonicteric. Clear oropharynx. Mucous membranes moist.  NECK: Supple, no masses appreciated. Trachea midline. No " appreciable JVD, sitting upright.   C/V:  Regular rate and rhythm, with intermittent extra beat. No murmur, rub or gallop. No S3 or RV heave. Mild/trace edema.   RESP: Respirations are unlabored. No use of accessory muscles. CTAB  GI: Abdomen obese, soft, nontender, nondistended.   EXTREM: no edema. No cyanosis or clubbing.  NEURO: Alert and oriented, cooperative. Gait slow, using rolling walker. No obvious focal deficits.  SKIN: Warm and dry. No rashes or petechiae appreciated.       Recent Lab Results:    Outside labs:     1/16/18 BMP (in care everywhere)    K 4.3    CO2 25  Anion Gap 7  Ca 9.1  BUN 26  Cr 1.61  GFR 28  GFR if black 33      New imaging:  None    Thank you for allowing me to participate in the care of your patient.    Sincerely,     Linda Holbrook PA-C     [facility]

## 2018-02-06 ENCOUNTER — MEDICAL CORRESPONDENCE (OUTPATIENT)
Dept: HEALTH INFORMATION MANAGEMENT | Facility: CLINIC | Age: 83
End: 2018-02-06

## 2018-02-12 ENCOUNTER — RECORDS - HEALTHEAST (OUTPATIENT)
Dept: LAB | Facility: CLINIC | Age: 83
End: 2018-02-12

## 2018-02-12 LAB
ANION GAP SERPL CALCULATED.3IONS-SCNC: 7 MMOL/L (ref 5–18)
BUN SERPL-MCNC: 29 MG/DL (ref 8–28)
CALCIUM SERPL-MCNC: 8.6 MG/DL (ref 8.5–10.5)
CHLORIDE BLD-SCNC: 108 MMOL/L (ref 98–107)
CO2 SERPL-SCNC: 26 MMOL/L (ref 22–31)
CREAT SERPL-MCNC: 1.41 MG/DL (ref 0.6–1.1)
GFR SERPL CREATININE-BSD FRML MDRD: 35 ML/MIN/1.73M2
GLUCOSE BLD-MCNC: 88 MG/DL (ref 70–125)
POTASSIUM BLD-SCNC: 4.4 MMOL/L (ref 3.5–5)
SODIUM SERPL-SCNC: 141 MMOL/L (ref 136–145)

## 2018-04-05 ENCOUNTER — OFFICE VISIT (OUTPATIENT)
Dept: CARDIOLOGY | Facility: CLINIC | Age: 83
End: 2018-04-05
Attending: PHYSICIAN ASSISTANT
Payer: MEDICARE

## 2018-04-05 ENCOUNTER — DOCUMENTATION ONLY (OUTPATIENT)
Dept: CARDIOLOGY | Facility: CLINIC | Age: 83
End: 2018-04-05

## 2018-04-05 VITALS
HEART RATE: 64 BPM | BODY MASS INDEX: 40.92 KG/M2 | SYSTOLIC BLOOD PRESSURE: 118 MMHG | DIASTOLIC BLOOD PRESSURE: 50 MMHG | WEIGHT: 239.7 LBS | HEIGHT: 64 IN

## 2018-04-05 DIAGNOSIS — I10 BENIGN ESSENTIAL HYPERTENSION: ICD-10-CM

## 2018-04-05 DIAGNOSIS — R60.0 LOWER EXTREMITY EDEMA: ICD-10-CM

## 2018-04-05 LAB
ANION GAP SERPL CALCULATED.3IONS-SCNC: 6 MMOL/L (ref 3–14)
BUN SERPL-MCNC: 26 MG/DL (ref 7–30)
CALCIUM SERPL-MCNC: 8.6 MG/DL (ref 8.5–10.1)
CHLORIDE SERPL-SCNC: 106 MMOL/L (ref 94–109)
CO2 SERPL-SCNC: 30 MMOL/L (ref 20–32)
CREAT SERPL-MCNC: 1.67 MG/DL (ref 0.52–1.04)
GFR SERPL CREATININE-BSD FRML MDRD: 29 ML/MIN/1.7M2
GLUCOSE SERPL-MCNC: 111 MG/DL (ref 70–99)
POTASSIUM SERPL-SCNC: 4 MMOL/L (ref 3.4–5.3)
SODIUM SERPL-SCNC: 142 MMOL/L (ref 133–144)

## 2018-04-05 PROCEDURE — 99214 OFFICE O/P EST MOD 30 MIN: CPT | Performed by: PHYSICIAN ASSISTANT

## 2018-04-05 PROCEDURE — 36415 COLL VENOUS BLD VENIPUNCTURE: CPT | Performed by: PHYSICIAN ASSISTANT

## 2018-04-05 PROCEDURE — 80048 BASIC METABOLIC PNL TOTAL CA: CPT | Performed by: PHYSICIAN ASSISTANT

## 2018-04-05 RX ORDER — DILTIAZEM HYDROCHLORIDE 180 MG/1
180 CAPSULE, EXTENDED RELEASE ORAL DAILY
Qty: 90 CAPSULE | Refills: 3 | Status: SHIPPED | OUTPATIENT
Start: 2018-04-05 | End: 2018-05-04

## 2018-04-05 NOTE — MR AVS SNAPSHOT
After Visit Summary   4/5/2018    Anastasiya Mota    MRN: [id number]           Patient Information     Date Of Birth          5/1/1929        Visit Information        Provider Department      4/5/2018 3:00 PM Linda Holbrook PA-C [facility]        Today's Diagnoses     Lower extremity edema        Benign essential hypertension          Care Instructions    Due to the shortness of breath with exertion, mild edema - will decrease the diltiazem to 180 mg daily to allow the HR to come up a little and to hopefully lessen edema.     Facility to check BP in AM as they are doing, but also in the afternoon.     Follow up in 1 month lab.          Follow-ups after your visit        Additional Services     Follow-Up with Cardiac Advanced Practice Provider                 Your next 10 appointments already scheduled     May 04, 2018  2:00 PM CDT   LAB with RU LAB   [facility] [place] ([facility])    [address] [place] [id number]   [phone]           Please do not eat 10-12 hours before your appointment if you are coming in fasting for labs on lipids, cholesterol, or glucose (sugar). This does not apply to pregnant women. Water, hot tea and black coffee (with nothing added) are okay. Do not drink other fluids, diet soda or chew gum.            May 04, 2018  3:00 PM CDT   Return Visit with Linda Holbrook PA-C   [facility] ([facility])    [address] [place] [id number]   [phone]              Future tests that were ordered for you today     Open Future Orders        Priority Expected Expires Ordered    Basic metabolic panel Routine 5/5/2018 4/5/2019 4/5/2018    Follow-Up with Cardiac Advanced Practice Provider Routine 5/5/2018 4/5/2019 4/5/2018            Who to contact     If you have questions or need follow up information  "about today's clinic visit or your schedule please contact [facility] directly at [phone].  Normal or non-critical lab and imaging results will be communicated to you by [employer], letter or phone within 4 business days after the clinic has received the results. If you do not hear from us within 7 days, please contact the clinic through [employer] or phone. If you have a critical or abnormal lab result, we will notify you by phone as soon as possible.  Submit refill requests through [employer] or call your pharmacy and they will forward the refill request to us. Please allow 3 business days for your refill to be completed.          Additional Information About Your Visit        [employer]Now Information     [employer] lets you send messages to your doctor, view your test results, renew your prescriptions, schedule appointments and more. To sign up, go to [email or web address] . Click on \"Log in\" on the left side of the screen, which will take you to the Welcome page. Then click on \"Sign up Now\" on the right side of the page.     You will be asked to enter the access code listed below, as well as some personal information. Please follow the directions to create your username and password.     Your access code is: [email or web address]  Expires: 2018  2:35 PM     Your access code will  in 90 days. If you need help or a new code, please call your [facility] clinic or [phone].        Care EveryWhere ID     This is your Care EveryWhere ID. This could be used by other organizations to access your [facility] medical records  [id number]        Your Vitals Were     Pulse Height Breastfeeding? BMI (Body Mass Index)          64 1.626 m (5' 4\") No 41.14 kg/m2         Blood Pressure from Last 3 Encounters:   18 118/50   18 110/60   12/15/17 128/75    Weight from Last 3 Encounters:   18 108.7 kg (239 lb 11.2 oz)   18 108.4 kg (239 lb)   12/15/17 109 kg (240 lb 3.2 oz) "              We Performed the Following     Follow-Up with Cardiac Advanced Practice Provider          Today's Medication Changes          These changes are accurate as of 4/5/18  3:49 PM.  If you have any questions, ask your nurse or doctor.               Start taking these medicines.        Dose/Directions    diltiazem 180 MG 24 hr capsule   Commonly known as:  DILACOR XR   Used for:  Benign essential hypertension   Started by:  Linda Holbrook PA-C        Dose:  180 mg   Take 1 capsule (180 mg) by mouth daily   Quantity:  90 capsule   Refills:  3         Stop taking these medicines if you haven't already. Please contact your care team if you have questions.     diltiazem 240 MG 24 hr capsule   Stopped by:  Linda Holbrook PA-C                Where to get your medicines      These medications were sent to [facility], [address], [facility] [address]     Phone:  [phone]     diltiazem 180 MG 24 hr capsule                Primary Care Provider Office Phone # Fax #    [facility] [phone] [address] [facility] [address]        Equal Access to Services     LARA OLIVEIRA AH: Hadii aad ku hadasho Soomaali, waaxda luqadaha, qaybta kaalmada adeegyada, jeremias oneill . So [facility] [phone].    ATENCIÓN: Si habla español, tiene a vasquez disposición servicios gratuitos de asistencia lingüística. Llame al [phone].    We comply with applicable federal civil rights laws and [place] laws. We do not discriminate on the basis of race, color, national origin, age, disability, sex, sexual orientation, or gender identity.            Thank you!     Thank you for choosing [facility]  for your care. Our goal is always to provide you with excellent care. Hearing back from our patients is one way we can continue to improve  our services. Please take a few minutes to complete the written survey that you may receive in the mail after your visit with us. Thank you!             Your Updated Medication List - Protect others around you: Learn how to safely use, store and throw away your medicines at www.disposemymeds.org.          This list is accurate as of 4/5/18  3:49 PM.  Always use your most recent med list.                   Brand Name Dispense Instructions for use Diagnosis    ACETAMINOPHEN PO      Take 1,000 mg by mouth 3 times daily as needed        ANASTROZOLE PO      Take 1 mg by mouth daily        aspirin 81 MG EC tablet     90 tablet    Take 1 tablet (81 mg) by mouth daily    CVA (cerebral vascular accident) (H)       atorvastatin 40 MG tablet    LIPITOR     Take 40 mg by mouth every evening        carvedilol 12.5 MG tablet    COREG    60 tablet    Take 25 mg by mouth 2 times daily (with meals)    Atrial flutter, unspecified type (H), SVT (supraventricular tachycardia) (H)       cloNIDine 0.1 MG tablet    CATAPRES     Take 0.1 mg by mouth 2 times daily as needed        diltiazem 180 MG 24 hr capsule    DILACOR XR    90 capsule    Take 1 capsule (180 mg) by mouth daily    Benign essential hypertension       ferrous sulfate 325 (65 FE) MG tablet    IRON     Take 325 mg by mouth daily (with breakfast)        hydrochlorothiazide 12.5 MG capsule    MICROZIDE    90 capsule    Take 1 capsule (12.5 mg) by mouth daily    Lower extremity edema, Benign essential hypertension       LANSOPRAZOLE PO      Take 30 mg by mouth every morning (before breakfast)        lisinopril 20 MG tablet    PRINIVIL/ZESTRIL    30 tablet    Take 1 tablet (20 mg) by mouth every evening    Benign essential hypertension       melatonin 3 MG tablet      Take 3 mg by mouth nightly as needed for sleep        polyethylene glycol powder    MIRALAX/GLYCOLAX     Take 17 g by mouth daily        SENEXON-S 8.6-50 MG per tablet   Generic drug:  senna-docusate      Take 2  tablets by mouth 2 times daily        ZOFRAN PO      Take 4 mg by mouth every 8 hours as needed for nausea or vomiting        ZOLOFT PO      Take 50 mg by mouth daily

## 2018-04-05 NOTE — PROGRESS NOTES
CARDIOLOGY CLINIC PROGRESS NOTE    DOS: 2018    Anastasiya Mota  : 1929, 88 year old  MRN: [id number]    Primary cardiologist: Seen previously by EP only--Dr. Peterson.  Now established with Dr. Barajas.      History:  I had the pleasure of following up with Anastasiya Mota along with her daughter today in the cardiology clinic.      Anastasiya is a pleasant 88 year old female with a PMHx of pSVT and aflutter, dating back to  after a prolonged hospitalization at [facility] for a GI bleed, at which time she had a NSTEMI, ASAD, and developed tachycardia, felt to be SVT/aflutter. She was then admitted at [facility] in 2016, noted to be tachycardic once again, placed on diltiazem, and subsequently amiodarone after being seen by EP. In 2017 her amio was discontinued, and she underwent a successful SVT ablation in 2017. She was seen back in follow up by EP in April, and was doing well, with no further tachyarrythmias on event monitor. Her anticoagulation was ultimately stopped once again due to GI bleed concerns and she is now only on an ASA. She was then to be followed on a PRN basis.       Of note, her last echo was in Dec 2016, and at that time her EF was 60-65% with no regional wall motion abnormalities. No significant valvular abnormalities noted.      Due to hypertension at her assisted living facility (SBPs 153-170), she saw Nhung Smith NP 10/19/17.  At that visit, she was on Lisinopril 30mg daily, Coreg 25mg BID, HCTZ 25mg daily, diltiazem 240 mg, and clonidine PRN. She noted some QUINTANILLA and some edema. Nhung added in spironolactone 12.5 mg daily. A BMP drawn about 2 weeks later (10/30/17) showed the creat had gone from 1.31 to 1.57. Due to better controlled BPs and the renal dysfunction, her lisinopril was decreased. Renal function remained elevated, so HCTZ decreased and held. She saw Dr. Barajas 12/15/17. Due to her renal function and her history of breast cancer on hormonal therapy,  spironolactone was discontinued and HCTZ 12.5 mg was added back. The facility did add back HCTZ but did not initially stop the spironolactone.  BMP showed BUN WNL and creat elevated but stable at 1.6. Off the spironolactone, creat was 1.5.     In follow up, BPs were controlled, and edema was improved.     They present today for follow up.  BPs from the facility are taken in the AM before meds and are variable from 140-180s. However, here in clinic her BP is well controlled at 118/50.  She has some mild edema, but much better in general. She has no chest pain. When she gets ready for bed she has some dyspnea, but none when she walks.  She does get a little fatigued when she walks.   No major complaints or concerns.         ROS:  Skin:  Negative     Eyes:  Positive for glasses  ENT:  Positive for hearing loss  Respiratory:  Positive for dyspnea on exertion  Cardiovascular:  Negative    Gastroenterology: Negative    Genitourinary:  Negative    Musculoskeletal:  Negative    Neurologic:  Negative memory problems  Psychiatric:  Negative    Heme/Lymph/Imm:  Negative    Endocrine:  Negative      PAST MEDICAL HISTORY:  Past Medical History:   Diagnosis Date     Acid reflux      Atrial fibrillation (H)      Breast cancer (H)      Cancer (H)      Cerebral infarction (H)      Constipation      Elevated cholesterol      Hypertension      Nausea      Paroxysmal supraventricular tachycardia (H)      SVT (supraventricular tachycardia) (H)        PAST SURGICAL HISTORY:  History reviewed. No pertinent surgical history.    SOCIAL HISTORY:  Social History     Social History     Marital status:      Spouse name: N/A     Number of children: N/A     Years of education: N/A     Social History Main Topics     Smoking status: Never Smoker     Smokeless tobacco: Never Used     Alcohol use No     Drug use: No     Sexual activity: Not Asked     Other Topics Concern     Caffeine Concern No     Special Diet No     regular diet       "Exercise No     Social History Narrative       FAMILY HISTORY:  Family History   Problem Relation Age of Onset     Unknown/Adopted No family hx of        MEDS:   Current Outpatient Prescriptions on File Prior to Visit:  polyethylene glycol (MIRALAX/GLYCOLAX) powder Take 17 g by mouth daily   hydrochlorothiazide (MICROZIDE) 12.5 MG capsule Take 1 capsule (12.5 mg) by mouth daily   lisinopril (PRINIVIL/ZESTRIL) 20 MG tablet Take 1 tablet (20 mg) by mouth every evening   cloNIDine (CATAPRES) 0.1 MG tablet Take 0.1 mg by mouth 2 times daily as needed   melatonin 3 MG tablet Take 3 mg by mouth nightly as needed for sleep   aspirin 81 MG EC tablet Take 1 tablet (81 mg) by mouth daily   Ondansetron HCl (ZOFRAN PO) Take 4 mg by mouth every 8 hours as needed for nausea or vomiting   Sertraline HCl (ZOLOFT PO) Take 50 mg by mouth daily   carvedilol (COREG) 12.5 MG tablet Take 25 mg by mouth 2 times daily (with meals)    ferrous sulfate (IRON) 325 (65 FE) MG tablet Take 325 mg by mouth daily (with breakfast)    ANASTROZOLE PO Take 1 mg by mouth daily    LANSOPRAZOLE PO Take 30 mg by mouth every morning (before breakfast)    senna-docusate (SENEXON-S) 8.6-50 MG per tablet Take 2 tablets by mouth 2 times daily   atorvastatin (LIPITOR) 40 MG tablet Take 40 mg by mouth every evening    ACETAMINOPHEN PO Take 1,000 mg by mouth 3 times daily as needed      No current facility-administered medications on file prior to visit.     ALLERGIES: No Known Allergies    PHYSICAL EXAM:  Vitals: /50 (BP Location: Right arm, Patient Position: Sitting, Cuff Size: Adult Large)  Pulse 64  Ht 1.626 m (5' 4\")  Wt 108.7 kg (239 lb 11.2 oz)  Breastfeeding? No  BMI 41.14 kg/m2  Constitutional:  cooperative, alert and oriented, well developed, well nourished, in no acute distress        Skin:  warm and dry to the touch, no apparent skin lesions or masses noted        Head:  normocephalic, no masses or lesions        Eyes:  pupils equal and " round;conjunctivae and lids unremarkable        ENT:  no pallor or cyanosis        Neck:  JVP normal        Respiratory:  normal breath sounds, clear to auscultation, normal A-P diameter, normal symmetry, normal respiratory excursion, no use of accessory muscles        Cardiac: regular rhythm;no murmurs, gallops or rubs detected                  GI:  abdomen soft;BS normoactive obese      Vascular:                                        Extremities and Musculoskeletal:  no deformities, clubbing, cyanosis, erythema observed   walks with walker, at most trace edema    Neurological:  no gross motor deficits;affect appropriate          LABS/DATA:  I reviewed the following:  Component      Latest Ref Rng & Units 4/5/2018   Sodium      133 - 144 mmol/L 142   Potassium      3.4 - 5.3 mmol/L 4.0   Chloride      94 - 109 mmol/L 106   Carbon Dioxide      20 - 32 mmol/L 30   Anion Gap      3 - 14 mmol/L 6   Glucose      70 - 99 mg/dL 111 (H)   Urea Nitrogen      7 - 30 mg/dL 26   Creatinine      0.52 - 1.04 mg/dL 1.67 (H)   GFR Estimate      >60 mL/min/1.7m2 29 (L)   GFR Estimate If Black      >60 mL/min/1.7m2 35 (L)   Calcium      8.5 - 10.1 mg/dL 8.6         ASSESSMENT/PLAN:  1.  Hypertension.  --Reportedly somewhat difficult to control in the past as well.   --Now controlled on lisinopril 20 mg QHS, HCTZ 12.5 mg, diltiazem 240mg, Coreg 12.5 mg BID, clonidine 0.1 mg BID PRN.    --Decreasing diltiazem to 180 mg due to mild edema and QUINTANILLA.   --Continue to follow BP. I have asked facility to check in AM but also in afternoon daily. May need to decrease/DC HCTZ and lisinopril due to renal function. Off spironolactone due to h/o breast cancer on hormonal therapy.               2.  Hx of SVT/aflutter, s/p ablation.   --Clinically do not suspect recurrence of tachyarrythmia.   --Chronically remains on diltiazem and Coreg.  Decreasing diltiazem today as noted above.   --Given hx of GIB she remains off full AC, and on ASA only.        3.  Mild QUINTANILLA.   --In SR with HRs 50-60s after SVT ablation, and maintained on Coreg and diltiazem. Could be related to chronotropic incompetence. Will decrease diltiazem to 180 mg. In the future, we may discontinue.       4.  Edema.   --Minimal edema now.  --Decreasing diltiazem as noted above.   --Is on HCTZ 12.5 mg.      5.  CKD.   --Creatinine today is 1.67        Follow up in 1 month to reassess QUINTANILLA, edema, BP, and repeat BMP.        Linda Holbrook PA-C

## 2018-04-05 NOTE — PATIENT INSTRUCTIONS
Due to the shortness of breath with exertion, mild edema - will decrease the diltiazem to 180 mg daily to allow the HR to come up a little and to hopefully lessen edema.     Facility to check BP in AM as they are doing, but also in the afternoon.     Follow up in 1 month lab.

## 2018-04-05 NOTE — LETTER
2018    [facility]  [address] [address]    RE: Anastasiya Sagastume Svetlana       Dear Colleague,    I had the pleasure of seeing Anastasiya Mota in the [facility].    CARDIOLOGY CLINIC PROGRESS NOTE    DOS: 2018    Anastasiya Mota  : 1929, 88 year old  MRN: [id number]    Primary cardiologist: Seen previously by EP only--Dr. Peterson.  Now established with Dr. Barajas.      History:  I had the pleasure of following up with Anastasiya Mota along with her daughter today in the cardiology clinic.      Anastasiya is a pleasant 88 year old female with a PMHx of pSVT and aflutter, dating back to  after a prolonged hospitalization at [place] for a GI bleed, at which time she had a NSTEMI, ASAD, and developed tachycardia, felt to be SVT/aflutter. She was then admitted at [facility] in 2016, noted to be tachycardic once again, placed on diltiazem, and subsequently amiodarone after being seen by EP. In 2017 her amio was discontinued, and she underwent a successful SVT ablation in 2017. She was seen back in follow up by EP in April, and was doing well, with no further tachyarrythmias on event monitor. Her anticoagulation was ultimately stopped once again due to GI bleed concerns and she is now only on an ASA. She was then to be followed on a PRN basis.       Of note, her last echo was in Dec 2016, and at that time her EF was 60-65% with no regional wall motion abnormalities. No significant valvular abnormalities noted.      Due to hypertension at her assisted living facility (SBPs 153-170), she saw Nhung Smith NP 10/19/17.  At that visit, she was on Lisinopril 30mg daily, Coreg 25mg BID, HCTZ 25mg daily, diltiazem 240 mg, and clonidine PRN. She noted some QUINTANILLA and some edema. Nhung added in spironolactone 12.5 mg daily. A BMP drawn about 2 weeks later (10/30/17) showed the creat had gone from 1.31 to 1.57. Due to better  controlled BPs and the renal dysfunction, her lisinopril was decreased. Renal function remained elevated, so HCTZ decreased and held. She saw Dr. Barajas 12/15/17. Due to her renal function and her history of breast cancer on hormonal therapy, spironolactone was discontinued and HCTZ 12.5 mg was added back. The facility did add back HCTZ but did not initially stop the spironolactone.  BMP showed BUN WNL and creat elevated but stable at 1.6. Off the spironolactone, creat was 1.5.     In follow up, BPs were controlled, and edema was improved.     They present today for follow up.  BPs from the facility are taken in the AM before meds and are variable from 140-180s. However, here in clinic her BP is well controlled at 118/50.  She has some mild edema, but much better in general. She has no chest pain. When she gets ready for bed she has some dyspnea, but none when she walks.  She does get a little fatigued when she walks.   No major complaints or concerns.         ROS:  Skin:  Negative     Eyes:  Positive for glasses  ENT:  Positive for hearing loss  Respiratory:  Positive for dyspnea on exertion  Cardiovascular:  Negative    Gastroenterology: Negative    Genitourinary:  Negative    Musculoskeletal:  Negative    Neurologic:  Negative memory problems  Psychiatric:  Negative    Heme/Lymph/Imm:  Negative    Endocrine:  Negative      PAST MEDICAL HISTORY:  Past Medical History:   Diagnosis Date     Acid reflux      Atrial fibrillation (H)      Breast cancer (H)      Cancer (H)      Cerebral infarction (H)      Constipation      Elevated cholesterol      Hypertension      Nausea      Paroxysmal supraventricular tachycardia (H)      SVT (supraventricular tachycardia) (H)        PAST SURGICAL HISTORY:  History reviewed. No pertinent surgical history.    SOCIAL HISTORY:  Social History     Social History     Marital status:      Spouse name: N/A     Number of children: N/A     Years of education: N/A     Social History Main  "Topics     Smoking status: Never Smoker     Smokeless tobacco: Never Used     Alcohol use No     Drug use: No     Sexual activity: Not Asked     Other Topics Concern     Caffeine Concern No     Special Diet No     regular diet      Exercise No     Social History Narrative       FAMILY HISTORY:  Family History   Problem Relation Age of Onset     Unknown/Adopted No family hx of        MEDS:   Current Outpatient Prescriptions on File Prior to Visit:  polyethylene glycol (MIRALAX/GLYCOLAX) powder Take 17 g by mouth daily   hydrochlorothiazide (MICROZIDE) 12.5 MG capsule Take 1 capsule (12.5 mg) by mouth daily   lisinopril (PRINIVIL/ZESTRIL) 20 MG tablet Take 1 tablet (20 mg) by mouth every evening   cloNIDine (CATAPRES) 0.1 MG tablet Take 0.1 mg by mouth 2 times daily as needed   melatonin 3 MG tablet Take 3 mg by mouth nightly as needed for sleep   aspirin 81 MG EC tablet Take 1 tablet (81 mg) by mouth daily   Ondansetron HCl (ZOFRAN PO) Take 4 mg by mouth every 8 hours as needed for nausea or vomiting   Sertraline HCl (ZOLOFT PO) Take 50 mg by mouth daily   carvedilol (COREG) 12.5 MG tablet Take 25 mg by mouth 2 times daily (with meals)    ferrous sulfate (IRON) 325 (65 FE) MG tablet Take 325 mg by mouth daily (with breakfast)    ANASTROZOLE PO Take 1 mg by mouth daily    LANSOPRAZOLE PO Take 30 mg by mouth every morning (before breakfast)    senna-docusate (SENEXON-S) 8.6-50 MG per tablet Take 2 tablets by mouth 2 times daily   atorvastatin (LIPITOR) 40 MG tablet Take 40 mg by mouth every evening    ACETAMINOPHEN PO Take 1,000 mg by mouth 3 times daily as needed      No current facility-administered medications on file prior to visit.     ALLERGIES: No Known Allergies    PHYSICAL EXAM:  Vitals: /50 (BP Location: Right arm, Patient Position: Sitting, Cuff Size: Adult Large)  Pulse 64  Ht 1.626 m (5' 4\")  Wt 108.7 kg (239 lb 11.2 oz)  Breastfeeding? No  BMI 41.14 kg/m2  Constitutional:  cooperative, alert " and oriented, well developed, well nourished, in no acute distress        Skin:  warm and dry to the touch, no apparent skin lesions or masses noted        Head:  normocephalic, no masses or lesions        Eyes:  pupils equal and round;conjunctivae and lids unremarkable        ENT:  no pallor or cyanosis        Neck:  JVP normal        Respiratory:  normal breath sounds, clear to auscultation, normal A-P diameter, normal symmetry, normal respiratory excursion, no use of accessory muscles        Cardiac: regular rhythm;no murmurs, gallops or rubs detected                  GI:  abdomen soft;BS normoactive obese      Vascular:                                        Extremities and Musculoskeletal:  no deformities, clubbing, cyanosis, erythema observed   walks with walker, at most trace edema    Neurological:  no gross motor deficits;affect appropriate          LABS/DATA:  I reviewed the following:  Component      Latest Ref Rng & Units 4/5/2018   Sodium      133 - 144 mmol/L 142   Potassium      3.4 - 5.3 mmol/L 4.0   Chloride      94 - 109 mmol/L 106   Carbon Dioxide      20 - 32 mmol/L 30   Anion Gap      3 - 14 mmol/L 6   Glucose      70 - 99 mg/dL 111 (H)   Urea Nitrogen      7 - 30 mg/dL 26   Creatinine      0.52 - 1.04 mg/dL 1.67 (H)   GFR Estimate      >60 mL/min/1.7m2 29 (L)   GFR Estimate If Black      >60 mL/min/1.7m2 35 (L)   Calcium      8.5 - 10.1 mg/dL 8.6         ASSESSMENT/PLAN:  1.  Hypertension.  --Reportedly somewhat difficult to control in the past as well.   --Now controlled on lisinopril 20 mg QHS, HCTZ 12.5 mg, diltiazem 240mg, Coreg 12.5 mg BID, clonidine 0.1 mg BID PRN.    --Decreasing diltiazem to 180 mg due to mild edema and QUINTANILLA.   --Continue to follow BP. I have asked facility to check in AM but also in afternoon daily. May need to decrease/DC HCTZ and lisinopril due to renal function. Off spironolactone due to h/o breast cancer on hormonal therapy.               2.  Hx of SVT/aflutter, s/p  ablation.   --Clinically do not suspect recurrence of tachyarrythmia.   --Chronically remains on diltiazem and Coreg.   Decreasing diltiazem today as noted above.   --Given hx of GIB she remains off full AC, and on ASA only.       3.  Mild QUINTANILLA.   --In SR with HRs 50-60s after SVT ablation, and maintained on Coreg and diltiazem. Could be related to chronotropic incompetence. Will decrease diltiazem to 180 mg. In the future, we may discontinue.       4.  Edema.   --Minimal edema now.  --Decreasing diltiazem as noted above.   --Is on HCTZ 12.5 mg.      5.  CKD.   --Creatinine today is 1.67        Follow up in 1 month to reassess QUINTANILLA, edema, BP, and repeat BMP.        Linda Holbrook PA-C    Thank you for allowing me to participate in the care of your patient.      Sincerely,     Linda Holbrook PA-C     [facility]    cc:   Linda Holbrook PA-C  [address] [place] [id number] [place] [address]

## 2018-05-03 ENCOUNTER — DOCUMENTATION ONLY (OUTPATIENT)
Dept: CARDIOLOGY | Facility: CLINIC | Age: 83
End: 2018-05-03

## 2018-05-03 NOTE — PROGRESS NOTES
Have attempted to call [facility] (pt's living facility) three times. Have left 2 vms advising to call back to discuss pt's med list. Pt is scheduled to see Linda Holbrook PA-C tomorrow. NP visits in Care Everywhere - med list different from ours.     Called [facility] on fourth attempt - reached RN. Confirmed med list is the same as ours, unsure why care everywhere visits state different. They take pt's wt monthly, occasional vitals:  4/6/18: BP in afternoon 130/53  4/7/18: BP in afternoon 152/82  4/11/18: BP in afternoon 162/79  4/13/18: BP in afternoon 132/57  4/20/18: BP in morning 169/68 HR 60, Wt 238#.     Confirmed pt has an appt fabiana Mckeon tomorrow at 3pm.   Chely TSANG

## 2018-05-04 ENCOUNTER — OFFICE VISIT (OUTPATIENT)
Dept: CARDIOLOGY | Facility: CLINIC | Age: 83
End: 2018-05-04
Attending: PHYSICIAN ASSISTANT
Payer: MEDICARE

## 2018-05-04 ENCOUNTER — DOCUMENTATION ONLY (OUTPATIENT)
Dept: CARDIOLOGY | Facility: CLINIC | Age: 83
End: 2018-05-04

## 2018-05-04 VITALS
SYSTOLIC BLOOD PRESSURE: 118 MMHG | WEIGHT: 234.4 LBS | DIASTOLIC BLOOD PRESSURE: 68 MMHG | HEART RATE: 60 BPM | OXYGEN SATURATION: 95 % | BODY MASS INDEX: 40.02 KG/M2 | HEIGHT: 64 IN

## 2018-05-04 DIAGNOSIS — R60.0 LOWER EXTREMITY EDEMA: ICD-10-CM

## 2018-05-04 DIAGNOSIS — I10 BENIGN ESSENTIAL HYPERTENSION: ICD-10-CM

## 2018-05-04 LAB
ANION GAP SERPL CALCULATED.3IONS-SCNC: 5 MMOL/L (ref 3–14)
BUN SERPL-MCNC: 25 MG/DL (ref 7–30)
CALCIUM SERPL-MCNC: 8.3 MG/DL (ref 8.5–10.1)
CHLORIDE SERPL-SCNC: 107 MMOL/L (ref 94–109)
CO2 SERPL-SCNC: 28 MMOL/L (ref 20–32)
CREAT SERPL-MCNC: 1.5 MG/DL (ref 0.52–1.04)
GFR SERPL CREATININE-BSD FRML MDRD: 33 ML/MIN/1.7M2
GLUCOSE SERPL-MCNC: 97 MG/DL (ref 70–99)
POTASSIUM SERPL-SCNC: 3.9 MMOL/L (ref 3.4–5.3)
SODIUM SERPL-SCNC: 140 MMOL/L (ref 133–144)

## 2018-05-04 PROCEDURE — 99214 OFFICE O/P EST MOD 30 MIN: CPT | Performed by: PHYSICIAN ASSISTANT

## 2018-05-04 PROCEDURE — 36415 COLL VENOUS BLD VENIPUNCTURE: CPT | Performed by: PHYSICIAN ASSISTANT

## 2018-05-04 PROCEDURE — 80048 BASIC METABOLIC PNL TOTAL CA: CPT | Performed by: PHYSICIAN ASSISTANT

## 2018-05-04 RX ORDER — DILTIAZEM HYDROCHLORIDE 120 MG/1
120 CAPSULE, EXTENDED RELEASE ORAL DAILY
Qty: 30 CAPSULE | Refills: 11 | Status: SHIPPED | OUTPATIENT
Start: 2018-05-04

## 2018-05-04 NOTE — PROGRESS NOTES
CARDIOLOGY CLINIC PROGRESS NOTE    DOS: 18    Anastasiya Mota  : 1929, 88 year old  MRN: [id number]    Primary cardiologist: Seen previously by EP only--Dr. Peterson.  Now established with Dr. Barajas.      History:  I had the pleasure of following up with Anastasiya Mota along with her daughter today in the cardiology clinic.      Anastasiya is a pleasant 89 year old female with a PMHx of pSVT and aflutter, dating back to  after a prolonged hospitalization at [facility] for a GI bleed, at which time she had a NSTEMI, ASAD, and developed tachycardia, felt to be SVT/aflutter. She was then admitted at [facility] in 2016, noted to be tachycardic once again, placed on diltiazem, and subsequently amiodarone after being seen by EP. In 2017 her amio was discontinued, and she underwent a successful SVT ablation in 2017. She was seen back in follow up by EP in April, and was doing well, with no further tachyarrythmias on event monitor. Her anticoagulation was ultimately stopped once again due to GI bleed concerns and she is now only on an ASA. She was then to be followed on a PRN basis.    Also h/o CVA in 2015.      Of note, her last echo was in Dec 2016, and at that time her EF was 60-65% with no regional wall motion abnormalities. No significant valvular abnormalities noted.      Due to hypertension at her assisted living facility (SBPs 153-170), she saw Nhung Smith NP 10/19/17.  At that visit, she was on Lisinopril 30mg daily, Coreg 25mg BID, HCTZ 25mg daily, diltiazem 240 mg, and clonidine PRN. She noted some QUINTANILLA and some edema. Nhung added in spironolactone 12.5 mg daily. A BMP drawn about 2 weeks later (10/30/17) showed the creat had gone from 1.31 to 1.57. Due to better controlled BPs and the renal dysfunction, her lisinopril was decreased. Renal function remained elevated, so HCTZ decreased and held. She saw Dr. Barajas 12/15/17. Due to her renal function and her history of breast cancer on  hormonal therapy, spironolactone was discontinued and HCTZ 12.5 mg was added back. The facility did add back HCTZ but did not initially stop the spironolactone.  BMP showed BUN WNL and creat elevated but stable at 1.6. Off the spironolactone, creat was 1.5.     In follow up, BPs were controlled, and edema was improved.   Due to some QUINTANILLA and edema and low HRs, we decreased diltiazem from 240 mg to 180 mg.     They present today for follow up.  Looks like the facility increased clonidine from 0.1 mg BID PRN to BID.  BPs from the facility:  4/6/18: BP in afternoon 130/53  4/7/18: BP in afternoon 152/82  4/11/18: BP in afternoon 162/79  4/13/18: BP in afternoon 132/57  4/20/18: BP in morning 169/68 HR 60, Wt 238#.   However, here in clinic her BP is always well controlled at 118/50.    Her edema is improved. Her QUINTANILLA is about the same.  She has no chest pain.   No major complaints or concerns.         ROS:  Skin:  Negative     Eyes:  Positive for glasses;cataracts  ENT:  Positive for hearing loss  Respiratory:  Positive for dyspnea on exertion  Cardiovascular:  Negative    Gastroenterology: Positive for constipation  Genitourinary:  Negative    Musculoskeletal:  Negative    Neurologic:  Positive for memory problems  Psychiatric:  Negative    Heme/Lymph/Imm:  Negative    Endocrine:  Negative      PAST MEDICAL HISTORY:  Past Medical History:   Diagnosis Date     Acid reflux      Atrial fibrillation (H)      Breast cancer (H)      Cancer (H)      Cerebral infarction (H)      Constipation      Elevated cholesterol      Hypertension      Nausea      Paroxysmal supraventricular tachycardia (H)      SVT (supraventricular tachycardia) (H)        PAST SURGICAL HISTORY:  No past surgical history on file.    SOCIAL HISTORY:  Social History     Social History     Marital status:      Spouse name: N/A     Number of children: N/A     Years of education: N/A     Social History Main Topics     Smoking status: Never Smoker      Smokeless tobacco: Never Used     Alcohol use No     Drug use: No     Sexual activity: Not Asked     Other Topics Concern     Caffeine Concern No     Special Diet No     regular diet      Exercise No     Social History Narrative       FAMILY HISTORY:  Family History   Problem Relation Age of Onset     No Known Problems Mother      No Known Problems Father      Unknown/Adopted No family hx of        MEDS:   Current Outpatient Prescriptions on File Prior to Visit:  ACETAMINOPHEN PO Take 1,000 mg by mouth 3 times daily as needed    ANASTROZOLE PO Take 1 mg by mouth daily    aspirin 81 MG EC tablet Take 1 tablet (81 mg) by mouth daily   atorvastatin (LIPITOR) 40 MG tablet Take 40 mg by mouth every evening    carvedilol (COREG) 12.5 MG tablet Take 25 mg by mouth 2 times daily (with meals)    cloNIDine (CATAPRES) 0.1 MG tablet Take 0.1 mg by mouth 2 times daily as needed   ferrous sulfate (IRON) 325 (65 FE) MG tablet Take 325 mg by mouth daily (with breakfast)    hydrochlorothiazide (MICROZIDE) 12.5 MG capsule Take 1 capsule (12.5 mg) by mouth daily   LANSOPRAZOLE PO Take 30 mg by mouth every morning (before breakfast)    lisinopril (PRINIVIL/ZESTRIL) 20 MG tablet Take 1 tablet (20 mg) by mouth every evening   melatonin 3 MG tablet Take 3 mg by mouth nightly as needed for sleep   Ondansetron HCl (ZOFRAN PO) Take 4 mg by mouth every 8 hours as needed for nausea or vomiting   polyethylene glycol (MIRALAX/GLYCOLAX) powder Take 17 g by mouth daily   senna-docusate (SENEXON-S) 8.6-50 MG per tablet Take 2 tablets by mouth 2 times daily   Sertraline HCl (ZOLOFT PO) Take 50 mg by mouth daily   [DISCONTINUED] diltiazem (DILACOR XR) 180 MG 24 hr capsule Take 1 capsule (180 mg) by mouth daily     No current facility-administered medications on file prior to visit.     ALLERGIES: No Known Allergies    PHYSICAL EXAM:  Vitals: /68 (BP Location: Right arm, Patient Position: Chair, Cuff Size: Adult Large)  Pulse 60  Ht 1.626 m  "(5' 4\")  Wt 106.3 kg (234 lb 6.4 oz)  SpO2 95%  BMI 40.23 kg/m2  Constitutional:  cooperative, alert and oriented, well developed, well nourished, in no acute distress        Skin:  warm and dry to the touch, no apparent skin lesions or masses noted        Head:  normocephalic, no masses or lesions        Eyes:  pupils equal and round;conjunctivae and lids unremarkable        ENT:  no pallor or cyanosis        Neck:  JVP normal        Respiratory:  normal breath sounds, clear to auscultation, normal A-P diameter, normal symmetry, normal respiratory excursion, no use of accessory muscles        Cardiac: regular rhythm;no murmurs, gallops or rubs detected                  GI:  abdomen soft;BS normoactive obese      Vascular:                                        Extremities and Musculoskeletal:  no deformities, clubbing, cyanosis, erythema observed;no edema   walks with walker     Neurological:  no gross motor deficits;affect appropriate aphasia        LABS/DATA:  I reviewed the following:  Component      Latest Ref Rng & Units 4/5/2018   Sodium      133 - 144 mmol/L 142   Potassium      3.4 - 5.3 mmol/L 4.0   Chloride      94 - 109 mmol/L 106   Carbon Dioxide      20 - 32 mmol/L 30   Anion Gap      3 - 14 mmol/L 6   Glucose      70 - 99 mg/dL 111 (H)   Urea Nitrogen      7 - 30 mg/dL 26   Creatinine      0.52 - 1.04 mg/dL 1.67 (H)   GFR Estimate      >60 mL/min/1.7m2 29 (L)   GFR Estimate If Black      >60 mL/min/1.7m2 35 (L)   Calcium      8.5 - 10.1 mg/dL 8.6         ASSESSMENT/PLAN:  1.  Hypertension.  --Reportedly somewhat difficult to control in the past as well.   --Controlled on lisinopril 20 mg QHS, HCTZ 12.5 mg, diltiazem 180 mg, Coreg 25 mg BID, clonidine 0.1 mg BID.    --Decreasing diltiazem to 120 mg due to low HRs and mild QUINTANILLA (?chronotropic incompetence). Could switch to amlodipine in the future.  --Continue to follow BP. I have asked facility to check in AM but also in afternoon daily. May need to " decrease/DC HCTZ and lisinopril due to renal function. Off spironolactone due to h/o breast cancer on hormonal therapy.               2.  Hx of SVT/aflutter, s/p ablation.   --Clinically do not suspect recurrence of tachyarrythmia.   --Chronically remains on diltiazem and Coreg.  Decreasing diltiazem today as noted above.   --Given hx of GIB she remains off full AC, and on ASA only.       3.  Mild QUINTANILLA.   --In SR with HRs 50-60s after SVT ablation, and maintained on Coreg and diltiazem. Could be related to chronotropic incompetence. Will decrease diltiazem to 120 mg. In the future, we may discontinue.        4.  Edema.   --Improved with prior decrease in diltiazem.  Decreasing diltiazem further as noted above.   --Is on HCTZ 12.5 mg.      5.  CKD.   --Creatinine today is 1.50        Follow up in 1-2 months to reassess QUINTANILLA, edema, BP.      Linda Holbrook PA-C

## 2018-05-04 NOTE — LETTER
2018    [facility]  [id number] [address] [id number]    RE: Anastasiya Sagastume Svetlana       Dear Colleague,    I had the pleasure of seeing Anastasiya Mota in the [facility].    CARDIOLOGY CLINIC PROGRESS NOTE    DOS: 18    Anastasiya Mota  : 1929, 88 year old  MRN: [id number]    Primary cardiologist: Seen previously by EP only--Dr. Peterson.  Now established with Dr. Barajas.      History:  I had the pleasure of following up with Anastasiya Mota along with her daughter today in the cardiology clinic.      Anastasiya is a pleasant 89 year old female with a PMHx of pSVT and aflutter, dating back to  after a prolonged hospitalization at [facility] for a GI bleed, at which time she had a NSTEMI, ASAD, and developed tachycardia, felt to be SVT/aflutter. She was then admitted at [facility] in 2016, noted to be tachycardic once again, placed on diltiazem, and subsequently amiodarone after being seen by EP. In 2017 her amio was discontinued, and she underwent a successful SVT ablation in 2017. She was seen back in follow up by EP in April, and was doing well, with no further tachyarrythmias on event monitor. Her anticoagulation was ultimately stopped once again due to GI bleed concerns and she is now only on an ASA. She was then to be followed on a PRN basis.    Also h/o CVA in 2015.      Of note, her last echo was in Dec 2016, and at that time her EF was 60-65% with no regional wall motion abnormalities. No significant valvular abnormalities noted.      Due to hypertension at her assisted living facility (SBPs 153-170), she saw Nhung Smith NP 10/19/17.  At that visit, she was on Lisinopril 30mg daily, Coreg 25mg BID, HCTZ 25mg daily, diltiazem 240 mg, and clonidine PRN. She noted some QUINTANILLA and some edema. Nhung added in spironolactone 12.5 mg daily. A BMP drawn about 2 weeks later (10/30/17) showed the creat had gone from 1.31 to  1.57. Due to better controlled BPs and the renal dysfunction, her lisinopril was decreased. Renal function remained elevated, so HCTZ decreased and held. She saw Dr. Barajas 12/15/17. Due to her renal function and her history of breast cancer on hormonal therapy, spironolactone was discontinued and HCTZ 12.5 mg was added back. The facility did add back HCTZ but did not initially stop the spironolactone.  BMP showed BUN WNL and creat elevated but stable at 1.6. Off the spironolactone, creat was 1.5.     In follow up, BPs were controlled, and edema was improved.   Due to some QUINTANILLA and edema and low HRs, we decreased diltiazem from 240 mg to 180 mg.     They present today for follow up.  Looks like the facility increased clonidine from 0.1 mg BID PRN to BID.  BPs from the facility:  4/6/18: BP in afternoon 130/53  4/7/18: BP in afternoon 152/82  4/11/18: BP in afternoon 162/79  4/13/18: BP in afternoon 132/57  4/20/18: BP in morning 169/68 HR 60, Wt 238#.   However, here in clinic her BP is always well controlled at 118/50.    Her edema is improved. Her QUINTANILLA is about the same.  She has no chest pain.   No major complaints or concerns.         ROS:  Skin:  Negative     Eyes:  Positive for glasses;cataracts  ENT:  Positive for hearing loss  Respiratory:  Positive for dyspnea on exertion  Cardiovascular:  Negative    Gastroenterology: Positive for constipation  Genitourinary:  Negative    Musculoskeletal:  Negative    Neurologic:  Positive for memory problems  Psychiatric:  Negative    Heme/Lymph/Imm:  Negative    Endocrine:  Negative      PAST MEDICAL HISTORY:  Past Medical History:   Diagnosis Date     Acid reflux      Atrial fibrillation (H)      Breast cancer (H)      Cancer (H)      Cerebral infarction (H)      Constipation      Elevated cholesterol      Hypertension      Nausea      Paroxysmal supraventricular tachycardia (H)      SVT (supraventricular tachycardia) (H)        PAST SURGICAL HISTORY:  No past surgical  history on file.    SOCIAL HISTORY:  Social History     Social History     Marital status:      Spouse name: N/A     Number of children: N/A     Years of education: N/A     Social History Main Topics     Smoking status: Never Smoker     Smokeless tobacco: Never Used     Alcohol use No     Drug use: No     Sexual activity: Not Asked     Other Topics Concern     Caffeine Concern No     Special Diet No     regular diet      Exercise No     Social History Narrative       FAMILY HISTORY:  Family History   Problem Relation Age of Onset     No Known Problems Mother      No Known Problems Father      Unknown/Adopted No family hx of        MEDS:   Current Outpatient Prescriptions on File Prior to Visit:  ACETAMINOPHEN PO Take 1,000 mg by mouth 3 times daily as needed    ANASTROZOLE PO Take 1 mg by mouth daily    aspirin 81 MG EC tablet Take 1 tablet (81 mg) by mouth daily   atorvastatin (LIPITOR) 40 MG tablet Take 40 mg by mouth every evening    carvedilol (COREG) 12.5 MG tablet Take 25 mg by mouth 2 times daily (with meals)    cloNIDine (CATAPRES) 0.1 MG tablet Take 0.1 mg by mouth 2 times daily as needed   ferrous sulfate (IRON) 325 (65 FE) MG tablet Take 325 mg by mouth daily (with breakfast)    hydrochlorothiazide (MICROZIDE) 12.5 MG capsule Take 1 capsule (12.5 mg) by mouth daily   LANSOPRAZOLE PO Take 30 mg by mouth every morning (before breakfast)    lisinopril (PRINIVIL/ZESTRIL) 20 MG tablet Take 1 tablet (20 mg) by mouth every evening   melatonin 3 MG tablet Take 3 mg by mouth nightly as needed for sleep   Ondansetron HCl (ZOFRAN PO) Take 4 mg by mouth every 8 hours as needed for nausea or vomiting   polyethylene glycol (MIRALAX/GLYCOLAX) powder Take 17 g by mouth daily   senna-docusate (SENEXON-S) 8.6-50 MG per tablet Take 2 tablets by mouth 2 times daily   Sertraline HCl (ZOLOFT PO) Take 50 mg by mouth daily   [DISCONTINUED] diltiazem (DILACOR XR) 180 MG 24 hr capsule Take 1 capsule (180 mg) by mouth daily  "    No current facility-administered medications on file prior to visit.     ALLERGIES: No Known Allergies    PHYSICAL EXAM:  Vitals: /68 (BP Location: Right arm, Patient Position: Chair, Cuff Size: Adult Large)  Pulse 60  Ht 1.626 m (5' 4\")  Wt 106.3 kg (234 lb 6.4 oz)  SpO2 95%  BMI 40.23 kg/m2  Constitutional:  cooperative, alert and oriented, well developed, well nourished, in no acute distress        Skin:  warm and dry to the touch, no apparent skin lesions or masses noted        Head:  normocephalic, no masses or lesions        Eyes:  pupils equal and round;conjunctivae and lids unremarkable        ENT:  no pallor or cyanosis        Neck:  JVP normal        Respiratory:  normal breath sounds, clear to auscultation, normal A-P diameter, normal symmetry, normal respiratory excursion, no use of accessory muscles        Cardiac: regular rhythm;no murmurs, gallops or rubs detected                  GI:  abdomen soft;BS normoactive obese      Vascular:                                        Extremities and Musculoskeletal:  no deformities, clubbing, cyanosis, erythema observed;no edema   walks with walker     Neurological:  no gross motor deficits;affect appropriate aphasia        LABS/DATA:  I reviewed the following:  Component      Latest Ref Rng & Units 4/5/2018   Sodium      133 - 144 mmol/L 142   Potassium      3.4 - 5.3 mmol/L 4.0   Chloride      94 - 109 mmol/L 106   Carbon Dioxide      20 - 32 mmol/L 30   Anion Gap      3 - 14 mmol/L 6   Glucose      70 - 99 mg/dL 111 (H)   Urea Nitrogen      7 - 30 mg/dL 26   Creatinine      0.52 - 1.04 mg/dL 1.67 (H)   GFR Estimate      >60 mL/min/1.7m2 29 (L)   GFR Estimate If Black      >60 mL/min/1.7m2 35 (L)   Calcium      8.5 - 10.1 mg/dL 8.6         ASSESSMENT/PLAN:  1.  Hypertension.  --Reportedly somewhat difficult to control in the past as well.   --Controlled on lisinopril 20 mg QHS, HCTZ 12.5 mg, diltiazem 180 mg, Coreg 25 mg BID, clonidine 0.1 mg " BID.    --Decreasing diltiazem to 120 mg due to low HRs and mild QUINTANILLA (?chronotropic incompetence). Could switch to amlodipine in the future.  --Continue to follow BP. I have asked facility to check in AM but also in afternoon daily. May need to decrease/DC HCTZ and lisinopril due to renal function. Off spironolactone due to h/o breast cancer on hormonal therapy.               2.  Hx of SVT/aflutter, s/p ablation.   --Clinically do not suspect recurrence of tachyarrythmia.   --Chronically remains on diltiazem and Coreg.  Decreasing diltiazem today as noted above.   --Given hx of GIB she remains off full AC, and on ASA only.       3.  Mild QUINTANILLA.   --In SR with HRs 50-60s after SVT ablation, and maintained on Coreg and diltiazem. Could be related to chronotropic incompetence. Will decrease diltiazem to 120 mg. In the future, we may discontinue.        4.  Edema.   --Improved with prior decrease in diltiazem.  Decreasing diltiazem further as noted above.   --Is on HCTZ 12.5 mg.      5.  CKD.   --Creatinine today is 1.50        Follow up in 1-2 months to reassess QUINTANILLA, edema, BP.      Thank you for allowing me to participate in the care of your patient.    Sincerely,     Linda Holbrook PA-C     [facility]

## 2018-05-04 NOTE — LETTER
2018    [facility]  [address] [facility] [address]    RE: Anastasiya Sagastume Svetlana       Dear Colleague,    I had the pleasure of seeing Anastasiya Mota in the [facility].    CARDIOLOGY CLINIC PROGRESS NOTE    DOS: 18    Anastasiya Mota  : 1929, 88 year old  MRN: [id number]    Primary cardiologist: Seen previously by EP only--Dr. Peterson.  Now established with Dr. Barajas.      History:  I had the pleasure of following up with Anastasiya Mota along with her daughter today in the cardiology clinic.      Anastasiya is a pleasant 89 year old female with a PMHx of pSVT and aflutter, dating back to  after a prolonged hospitalization at [facility] for a GI bleed, at which time she had a NSTEMI, ASAD, and developed tachycardia, felt to be SVT/aflutter. She was then admitted at [facility] in 2016, noted to be tachycardic once again, placed on diltiazem, and subsequently amiodarone after being seen by EP. In 2017 her amio was discontinued, and she underwent a successful SVT ablation in 2017. She was seen back in follow up by EP in April, and was doing well, with no further tachyarrythmias on event monitor. Her anticoagulation was ultimately stopped once again due to GI bleed concerns and she is now only on an ASA. She was then to be followed on a PRN basis.    Also h/o CVA in 2015.      Of note, her last echo was in Dec 2016, and at that time her EF was 60-65% with no regional wall motion abnormalities. No significant valvular abnormalities noted.      Due to hypertension at her assisted living facility (SBPs 153-170), she saw Nhung Smith NP 10/19/17.  At that visit, she was on Lisinopril 30mg daily, Coreg 25mg BID, HCTZ 25mg daily, diltiazem 240 mg, and clonidine PRN. She noted some QUINTANILLA and some edema. Nhung added in spironolactone 12.5 mg daily. A BMP drawn about 2 weeks later (10/30/17) showed the creat had gone from 1.31 to  1.57. Due to better controlled BPs and the renal dysfunction, her lisinopril was decreased. Renal function remained elevated, so HCTZ decreased and held. She saw Dr. Barajas 12/15/17. Due to her renal function and her history of breast cancer on hormonal therapy, spironolactone was discontinued and HCTZ 12.5 mg was added back. The facility did add back HCTZ but did not initially stop the spironolactone.  BMP showed BUN WNL and creat elevated but stable at 1.6. Off the spironolactone, creat was 1.5.     In follow up, BPs were controlled, and edema was improved.   Due to some QUINTANILLA and edema and low HRs, we decreased diltiazem from 240 mg to 180 mg.     They present today for follow up.  Looks like the facility increased clonidine from 0.1 mg BID PRN to BID.  BPs from the facility:  4/6/18: BP in afternoon 130/53  4/7/18: BP in afternoon 152/82  4/11/18: BP in afternoon 162/79  4/13/18: BP in afternoon 132/57  4/20/18: BP in morning 169/68 HR 60, Wt 238#.   However, here in clinic her BP is always well controlled at 118/50.    Her edema is improved. Her QUINTANILLA is about the same.  She has no chest pain.   No major complaints or concerns.         ROS:  Skin:  Negative     Eyes:  Positive for glasses;cataracts  ENT:  Positive for hearing loss  Respiratory:  Positive for dyspnea on exertion  Cardiovascular:  Negative    Gastroenterology: Positive for constipation  Genitourinary:  Negative    Musculoskeletal:  Negative    Neurologic:  Positive for memory problems  Psychiatric:  Negative    Heme/Lymph/Imm:  Negative    Endocrine:  Negative      PAST MEDICAL HISTORY:  Past Medical History:   Diagnosis Date     Acid reflux      Atrial fibrillation (H)      Breast cancer (H)      Cancer (H)      Cerebral infarction (H)      Constipation      Elevated cholesterol      Hypertension      Nausea      Paroxysmal supraventricular tachycardia (H)      SVT (supraventricular tachycardia) (H)        PAST SURGICAL HISTORY:  No past surgical  history on file.    SOCIAL HISTORY:  Social History     Social History     Marital status:      Spouse name: N/A     Number of children: N/A     Years of education: N/A     Social History Main Topics     Smoking status: Never Smoker     Smokeless tobacco: Never Used     Alcohol use No     Drug use: No     Sexual activity: Not Asked     Other Topics Concern     Caffeine Concern No     Special Diet No     regular diet      Exercise No     Social History Narrative       FAMILY HISTORY:  Family History   Problem Relation Age of Onset     No Known Problems Mother      No Known Problems Father      Unknown/Adopted No family hx of        MEDS:   Current Outpatient Prescriptions on File Prior to Visit:  ACETAMINOPHEN PO Take 1,000 mg by mouth 3 times daily as needed    ANASTROZOLE PO Take 1 mg by mouth daily    aspirin 81 MG EC tablet Take 1 tablet (81 mg) by mouth daily   atorvastatin (LIPITOR) 40 MG tablet Take 40 mg by mouth every evening    carvedilol (COREG) 12.5 MG tablet Take 25 mg by mouth 2 times daily (with meals)    cloNIDine (CATAPRES) 0.1 MG tablet Take 0.1 mg by mouth 2 times daily as needed   ferrous sulfate (IRON) 325 (65 FE) MG tablet Take 325 mg by mouth daily (with breakfast)    hydrochlorothiazide (MICROZIDE) 12.5 MG capsule Take 1 capsule (12.5 mg) by mouth daily   LANSOPRAZOLE PO Take 30 mg by mouth every morning (before breakfast)    lisinopril (PRINIVIL/ZESTRIL) 20 MG tablet Take 1 tablet (20 mg) by mouth every evening   melatonin 3 MG tablet Take 3 mg by mouth nightly as needed for sleep   Ondansetron HCl (ZOFRAN PO) Take 4 mg by mouth every 8 hours as needed for nausea or vomiting   polyethylene glycol (MIRALAX/GLYCOLAX) powder Take 17 g by mouth daily   senna-docusate (SENEXON-S) 8.6-50 MG per tablet Take 2 tablets by mouth 2 times daily   Sertraline HCl (ZOLOFT PO) Take 50 mg by mouth daily   [DISCONTINUED] diltiazem (DILACOR XR) 180 MG 24 hr capsule Take 1 capsule (180 mg) by mouth daily  "    No current facility-administered medications on file prior to visit.     ALLERGIES: No Known Allergies    PHYSICAL EXAM:  Vitals: /68 (BP Location: Right arm, Patient Position: Chair, Cuff Size: Adult Large)  Pulse 60  Ht 1.626 m (5' 4\")  Wt 106.3 kg (234 lb 6.4 oz)  SpO2 95%  BMI 40.23 kg/m2  Constitutional:  cooperative, alert and oriented, well developed, well nourished, in no acute distress        Skin:  warm and dry to the touch, no apparent skin lesions or masses noted        Head:  normocephalic, no masses or lesions        Eyes:  pupils equal and round;conjunctivae and lids unremarkable        ENT:  no pallor or cyanosis        Neck:  JVP normal        Respiratory:  normal breath sounds, clear to auscultation, normal A-P diameter, normal symmetry, normal respiratory excursion, no use of accessory muscles        Cardiac: regular rhythm;no murmurs, gallops or rubs detected                  GI:  abdomen soft;BS normoactive obese      Vascular:                                        Extremities and Musculoskeletal:  no deformities, clubbing, cyanosis, erythema observed;no edema   walks with walker     Neurological:  no gross motor deficits;affect appropriate aphasia        LABS/DATA:  I reviewed the following:  Component      Latest Ref Rng & Units 4/5/2018   Sodium      133 - 144 mmol/L 142   Potassium      3.4 - 5.3 mmol/L 4.0   Chloride      94 - 109 mmol/L 106   Carbon Dioxide      20 - 32 mmol/L 30   Anion Gap      3 - 14 mmol/L 6   Glucose      70 - 99 mg/dL 111 (H)   Urea Nitrogen      7 - 30 mg/dL 26   Creatinine      0.52 - 1.04 mg/dL 1.67 (H)   GFR Estimate      >60 mL/min/1.7m2 29 (L)   GFR Estimate If Black      >60 mL/min/1.7m2 35 (L)   Calcium      8.5 - 10.1 mg/dL 8.6         ASSESSMENT/PLAN:  1.  Hypertension.  --Reportedly somewhat difficult to control in the past as well.   --Controlled on lisinopril 20 mg QHS, HCTZ 12.5 mg, diltiazem 180 mg, Coreg 25 mg BID, clonidine 0.1 mg " BID.    --Decreasing diltiazem to 120 mg due to low HRs and mild QUINTANILLA (?chronotropic incompetence). Could switch to amlodipine in the future.  --Continue to follow BP. I have asked facility to check in AM but also in afternoon daily. May need to decrease/DC HCTZ and lisinopril due to renal function. Off spironolactone due to h/o breast cancer on hormonal therapy.               2.  Hx of SVT/aflutter, s/p ablation.   --Clinically do not suspect recurrence of tachyarrythmia.   --Chronically remains on diltiazem and Coreg.  Decreasing diltiazem today as noted above.   --Given hx of GIB she remains off full AC, and on ASA only.       3.  Mild QUINTANILLA.   --In SR with HRs 50-60s after SVT ablation, and maintained on Coreg and diltiazem. Could be related to chronotropic incompetence. Will decrease diltiazem to 120 mg. In the future, we may discontinue.        4.  Edema.   --Improved with prior decrease in diltiazem.  Decreasing diltiazem further as noted above.   --Is on HCTZ 12.5 mg.      5.  CKD.   --Creatinine today is 1.50        Follow up in 1-2 months to reassess QUINTANILLA, edema, BP.      Linda Holbrook PA-C    Thank you for allowing me to participate in the care of your patient.      Sincerely,     Linda Holbrook PA-C     [facility] [place]    cc:   Linda Holbrook PA-C  [address] [place] [address]

## 2018-05-04 NOTE — PATIENT INSTRUCTIONS
We will decrease the diltiazem to 120 mg daily to allow the HR to come up a little more.      BPs here are well controlled - no other changes to hear medications.      Facility to check BP in AM as they are doing, but also in the afternoon.      Follow up in 1 month.

## 2018-05-04 NOTE — MR AVS SNAPSHOT
After Visit Summary   5/4/2018    Anastasiya Mota    MRN: [id number]           Patient Information     Date Of Birth          5/1/1929        Visit Information        Provider Department      5/4/2018 3:00 PM Linda Holbrook PA-C [facility]        Today's Diagnoses     Lower extremity edema        Benign essential hypertension          Care Instructions    We will decrease the diltiazem to 120 mg daily to allow the HR to come up a little more.      BPs here are well controlled - no other changes to hear medications.      Facility to check BP in AM as they are doing, but also in the afternoon.      Follow up in 1 month.          Follow-ups after your visit        Additional Services     Follow-Up with Cardiac Advanced Practice Provider                 Your next 10 appointments already scheduled     Jul 06, 2018  1:50 PM CDT   Return Visit with Linda Holbrook PA-C   [facility] ([facility])    [address] [address]  [facility] [id number] [phone]              Future tests that were ordered for you today     Open Future Orders        Priority Expected Expires Ordered    Follow-Up with Cardiac Advanced Practice Provider Routine 7/3/2018 5/4/2019 5/4/2018            Who to contact     If you have questions or need follow up information about today's clinic visit or your schedule please contact [facility] directly at [phone].  Normal or non-critical lab and imaging results will be communicated to you by MyChart, letter or phone within 4 business days after the clinic has received the results. If you do not hear from us within 7 days, please contact the clinic through MyChart or phone. If you have a critical or abnormal lab result, we will notify you by phone as soon as possible.  Submit refill requests through [employer] or call your pharmacy and they  "will forward the refill request to us. Please allow 3 business days for your refill to be completed.          Additional Information About Your Visit        [employer] Information     [employer] lets you send messages to your doctor, view your test results, renew your prescriptions, schedule appointments and more. To sign up, go to [email or web address] . Click on \"Log in\" on the left side of the screen, which will take you to the Welcome page. Then click on \"Sign up Now\" on the right side of the page.     You will be asked to enter the access code listed below, as well as some personal information. Please follow the directions to create your username and password.     Your access code is: [email or web address]  Expires: 2018  3:44 PM     Your access code will  in 90 days. If you need help or a new code, please call your [place] clinic or [phone].        Care EveryWhere ID     This is your Care EveryWhere ID. This could be used by other organizations to access your [place] medical records  [id number]        Your Vitals Were     Pulse Height Pulse Oximetry BMI (Body Mass Index)          60 1.626 m (5' 4\") 95% 40.23 kg/m2         Blood Pressure from Last 3 Encounters:   18 118/68   18 118/50   18 110/60    Weight from Last 3 Encounters:   18 106.3 kg (234 lb 6.4 oz)   18 108.7 kg (239 lb 11.2 oz)   18 108.4 kg (239 lb)              We Performed the Following     Follow-Up with Cardiac Advanced Practice Provider          Today's Medication Changes          These changes are accurate as of 18  3:44 PM.  If you have any questions, ask your nurse or doctor.               These medicines have changed or have updated prescriptions.        Dose/Directions    diltiazem 120 MG 24 hr capsule   Commonly known as:  DILACOR XR   This may have changed:    - medication strength  - how much to take   Used for:  Benign essential hypertension   Changed by:  Linda Holbrook PA-C        " Dose:  120 mg   Take 1 capsule (120 mg) by mouth daily   Quantity:  30 capsule   Refills:  11            Where to get your medicines      These medications were sent to [facility] - [place], [place] - [address]  [address], [facility] [address]     Phone:  [phone]     diltiazem 120 MG 24 hr capsule                Primary Care Provider Office Phone # Fax #    [facility] [phone] [address] [facility] [address]        Equal Access to Services     [facility] Center[facility] : Hadii aad ku hadasho Soomaali, waaxda luqadaha, qaybta kaalmada adeegyada, waxay cheryin cata oneill . So [facility] [phone].    ATENCIÓN: Si habla español, tiene a vasquez disposición servicios gratuitos de asistencia lingüística. [facility] [phone].    We comply with applicable federal civil rights laws and [place] laws. We do not discriminate on the basis of race, color, national origin, age, disability, sex, sexual orientation, or gender identity.            Thank you!     Thank you for choosing [facility]  for your care. Our goal is always to provide you with excellent care. Hearing back from our patients is one way we can continue to improve our services. Please take a few minutes to complete the written survey that you may receive in the mail after your visit with us. Thank you!             Your Updated Medication List - Protect others around you: Learn how to safely use, store and throw away your medicines at www.disposemymeds.org.          This list is accurate as of 5/4/18  3:44 PM.  Always use your most recent med list.                   Brand Name Dispense Instructions for use Diagnosis    ACETAMINOPHEN PO      Take 1,000 mg by mouth 3 times daily as needed        ANASTROZOLE PO      Take 1 mg by mouth daily        aspirin 81 MG EC tablet     90 tablet    Take 1 tablet (81 mg)  by mouth daily    CVA (cerebral vascular accident) (H)       atorvastatin 40 MG tablet    LIPITOR     Take 40 mg by mouth every evening        carvedilol 12.5 MG tablet    COREG    60 tablet    Take 25 mg by mouth 2 times daily (with meals)    Atrial flutter, unspecified type (H), SVT (supraventricular tachycardia) (H)       cloNIDine 0.1 MG tablet    CATAPRES     Take 0.1 mg by mouth 2 times daily as needed        diltiazem 120 MG 24 hr capsule    DILACOR XR    30 capsule    Take 1 capsule (120 mg) by mouth daily    Benign essential hypertension       ferrous sulfate 325 (65 Fe) MG tablet    IRON     Take 325 mg by mouth daily (with breakfast)        hydrochlorothiazide 12.5 MG capsule    MICROZIDE    90 capsule    Take 1 capsule (12.5 mg) by mouth daily    Lower extremity edema, Benign essential hypertension       LANSOPRAZOLE PO      Take 30 mg by mouth every morning (before breakfast)        lisinopril 20 MG tablet    PRINIVIL/ZESTRIL    30 tablet    Take 1 tablet (20 mg) by mouth every evening    Benign essential hypertension       melatonin 3 MG tablet      Take 3 mg by mouth nightly as needed for sleep        polyethylene glycol powder    MIRALAX/GLYCOLAX     Take 17 g by mouth daily        SENEXON-S 8.6-50 MG per tablet   Generic drug:  senna-docusate      Take 2 tablets by mouth 2 times daily        ZOFRAN PO      Take 4 mg by mouth every 8 hours as needed for nausea or vomiting        ZOLOFT PO      Take 50 mg by mouth daily

## 2018-05-04 NOTE — PROGRESS NOTES
Sent today's OV note w/ Linda Holbrook PA-C, script w/ dose change of diltiazem from 180mg to 120mg daily and pt instructions to pt's facility [facility] RN staff.   Chely TSANG

## 2018-06-10 ENCOUNTER — RECORDS - HEALTHEAST (OUTPATIENT)
Dept: LAB | Facility: CLINIC | Age: 83
End: 2018-06-10

## 2018-06-11 LAB
ANION GAP SERPL CALCULATED.3IONS-SCNC: 8 MMOL/L (ref 5–18)
BUN SERPL-MCNC: 24 MG/DL (ref 8–28)
CALCIUM SERPL-MCNC: 9.2 MG/DL (ref 8.5–10.5)
CHLORIDE BLD-SCNC: 105 MMOL/L (ref 98–107)
CO2 SERPL-SCNC: 28 MMOL/L (ref 22–31)
CREAT SERPL-MCNC: 1.4 MG/DL (ref 0.6–1.1)
GFR SERPL CREATININE-BSD FRML MDRD: 35 ML/MIN/1.73M2
GLUCOSE BLD-MCNC: 70 MG/DL (ref 70–125)
POTASSIUM BLD-SCNC: 4.3 MMOL/L (ref 3.5–5)
SODIUM SERPL-SCNC: 141 MMOL/L (ref 136–145)

## 2018-06-13 ENCOUNTER — HOSPITAL ENCOUNTER (EMERGENCY)
Facility: CLINIC | Age: 83
Discharge: HOME | End: 2018-06-13
Attending: EMERGENCY MEDICINE | Admitting: EMERGENCY MEDICINE
Payer: MEDICARE

## 2018-06-13 VITALS
HEART RATE: 59 BPM | DIASTOLIC BLOOD PRESSURE: 55 MMHG | OXYGEN SATURATION: 95 % | RESPIRATION RATE: 18 BRPM | TEMPERATURE: 97.8 F | SYSTOLIC BLOOD PRESSURE: 133 MMHG

## 2018-06-13 DIAGNOSIS — R11.2 NAUSEA AND VOMITING, INTRACTABILITY OF VOMITING NOT SPECIFIED, UNSPECIFIED VOMITING TYPE: ICD-10-CM
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
## 2018-06-13 LAB
ANION GAP SERPL CALCULATED.3IONS-SCNC: 5 MMOL/L (ref 3–14)
BASOPHILS # BLD AUTO: 0 10E9/L (ref 0–0.2)
BASOPHILS NFR BLD AUTO: 0.3 %
BUN SERPL-MCNC: 21 MG/DL (ref 7–30)
CALCIUM SERPL-MCNC: 8.4 MG/DL (ref 8.5–10.1)
CHLORIDE SERPL-SCNC: 109 MMOL/L (ref 94–109)
CO2 SERPL-SCNC: 27 MMOL/L (ref 20–32)
CREAT SERPL-MCNC: 1.55 MG/DL (ref 0.52–1.04)
DIFFERENTIAL METHOD BLD: ABNORMAL
EOSINOPHIL # BLD AUTO: 0.1 10E9/L (ref 0–0.7)
EOSINOPHIL NFR BLD AUTO: 1 %
ERYTHROCYTE [DISTWIDTH] IN BLOOD BY AUTOMATED COUNT: 13.5 % (ref 10–15)
GFR SERPL CREATININE-BSD FRML MDRD: 31 ML/MIN/1.7M2
GLUCOSE SERPL-MCNC: 120 MG/DL (ref 70–99)
HCT VFR BLD AUTO: 40.8 % (ref 35–47)
HGB BLD-MCNC: 12.4 G/DL (ref 11.7–15.7)
IMM GRANULOCYTES # BLD: 0.1 10E9/L (ref 0–0.4)
IMM GRANULOCYTES NFR BLD: 0.5 %
LYMPHOCYTES # BLD AUTO: 1.2 10E9/L (ref 0.8–5.3)
LYMPHOCYTES NFR BLD AUTO: 9.4 %
MAGNESIUM SERPL-MCNC: 1.8 MG/DL (ref 1.6–2.3)
MCH RBC QN AUTO: 27.6 PG (ref 26.5–33)
MCHC RBC AUTO-ENTMCNC: 30.4 G/DL (ref 31.5–36.5)
MCV RBC AUTO: 91 FL (ref 78–100)
MONOCYTES # BLD AUTO: 1 10E9/L (ref 0–1.3)
MONOCYTES NFR BLD AUTO: 8.1 %
NEUTROPHILS # BLD AUTO: 10.3 10E9/L (ref 1.6–8.3)
NEUTROPHILS NFR BLD AUTO: 80.7 %
NRBC # BLD AUTO: 0 10*3/UL
NRBC BLD AUTO-RTO: 0 /100
PLATELET # BLD AUTO: 217 10E9/L (ref 150–450)
POTASSIUM SERPL-SCNC: 4.3 MMOL/L (ref 3.4–5.3)
RBC # BLD AUTO: 4.5 10E12/L (ref 3.8–5.2)
SODIUM SERPL-SCNC: 141 MMOL/L (ref 133–144)
TROPONIN I SERPL-MCNC: <0.015 UG/L (ref 0–0.04)
WBC # BLD AUTO: 12.8 10E9/L (ref 4–11)

## 2018-06-13 PROCEDURE — 84484 ASSAY OF TROPONIN QUANT: CPT | Performed by: EMERGENCY MEDICINE

## 2018-06-13 PROCEDURE — 80048 BASIC METABOLIC PNL TOTAL CA: CPT | Performed by: EMERGENCY MEDICINE

## 2018-06-13 PROCEDURE — 85025 COMPLETE CBC W/AUTO DIFF WBC: CPT | Performed by: EMERGENCY MEDICINE

## 2018-06-13 PROCEDURE — 99284 EMERGENCY DEPT VISIT MOD MDM: CPT

## 2018-06-13 PROCEDURE — 83735 ASSAY OF MAGNESIUM: CPT | Performed by: EMERGENCY MEDICINE

## 2018-06-13 PROCEDURE — 93005 ELECTROCARDIOGRAM TRACING: CPT

## 2018-06-13 PROCEDURE — 36415 COLL VENOUS BLD VENIPUNCTURE: CPT | Performed by: EMERGENCY MEDICINE

## 2018-06-13 ASSESSMENT — ENCOUNTER SYMPTOMS
LIGHT-HEADEDNESS: 0
ABDOMINAL PAIN: 0
DIARRHEA: 0
ABDOMINAL DISTENTION: 0
COUGH: 0
WOUND: 0
NUMBNESS: 0
HEADACHES: 0
PALPITATIONS: 0
CHILLS: 0
WEAKNESS: 0
BACK PAIN: 0
NAUSEA: 1
DIZZINESS: 0
FEVER: 0
VOMITING: 1

## 2018-06-13 NOTE — ED PROVIDER NOTES
"  History     Chief Complaint:    Vomiting    The history is provided by the patient.      Anastasiya Mota is a 89 year old female with chronic nausea on standing order of zofran who presents with emesis. Patient lives in an assisted living facility and was in her normal state of health up until one time approximately 30 minutes after the patient lunch she started to feel nauseous so she laid down.  At one point patient thought that she was going to get sick so she sat up on the edge of the bed and then when she went to lie back down patient \"slid off of the bed\".  She was unable to lift herself up off of the floor and had one episode of emesis while laying on the ground at that point patient hit her life alert button and staff came in to assist her.  Staff is uncertain whether or not she had hit her head so they called 911 and had the patient brought into the emergency department for evaluation.  She was given IV Zofran in route which resolved her nausea.  In the ED patient denies any headache or pain to the head no signs of contusion or laceration on physical exam.  Patient also denies pain anywhere else on her body and full trauma physical exam was unremarkable.  Currently denies any headache, nausea, dizziness, lightheadedness, chest pain, dyspnea, abdominal pain or dysuria.  No other concerns or complaints today.    PAST MEDICAL HISTORY:   Past Medical History:   Diagnosis Date     Acid reflux      Atrial fibrillation (H)      Breast cancer (H)      Cancer (H)      Cerebral infarction (H)      Constipation      Elevated cholesterol      Hypertension      Nausea      Paroxysmal supraventricular tachycardia (H)      SVT (supraventricular tachycardia) (H)        PAST SURGICAL HISTORY: History reviewed. No pertinent surgical history.    FAMILY HISTORY:   Family History   Problem Relation Age of Onset     No Known Problems Mother      No Known Problems Father      Unknown/Adopted No family hx of        SOCIAL " HISTORY:   Social History   Substance Use Topics     Smoking status: Never Smoker     Smokeless tobacco: Never Used     Alcohol use No         Review of Systems   Constitutional: Negative for chills and fever.   Eyes: Negative for visual disturbance.   Respiratory: Negative for cough.    Cardiovascular: Negative for chest pain and palpitations.   Gastrointestinal: Positive for nausea and vomiting. Negative for abdominal distention, abdominal pain and diarrhea.   Musculoskeletal: Negative for back pain.   Skin: Negative for wound.   Neurological: Negative for dizziness, syncope, weakness, light-headedness, numbness and headaches.   All other systems reviewed and are negative.        Physical Exam   First Vitals:  BP: 134/63  SpO2: 96 %      Physical Exam    General: Well-nourished, no acute distress  Eyes: PERRL, conjunctivae pink no scleral icterus or conjunctival injection  ENT:  Moist mucus membranes  Respiratory:  No respiratory distress, no wheezes, crackles or rails  CV: Normal rate, no murmurs, rubs or gallops  GI: Nontender, nondistended, normal bowel sounds  : No CVA tenderness  Skin: Warm, dry.  No rashes or petechiae  Musculoskeletal: Full active and passive range of motion in all extremities, strength 5 out of 5 throughout, no sign of any contusions or lacerations, no peripheral edema or calf tenderness  Neuro: Alert and oriented to person/place/time, cranial nerves II through XII intact  Psychiatric: Normal affect      Emergency Department Course     Interventions:    The patient's symptoms were improved with parenteral zofran given by EMS.      Impression & Plan      Medical Decision Making:    Anastasiya Mota is a 89 year old female who presents for evaluation of a fall as well as nausea and vomiting.  This was clearly a mechanical fall, not syncope. The patient states that she simply slipped off of the edge of the bed when she tried to lay down. There were no prodromal symptoms so I doubt stroke,  cardiac arrhythmia or other serious etiology.  Detailed exam shows no signs of injury.  Blood work is normal aside from elevated creatinine (though this is chronic). I had the tech ambulate the patient and she did well.  Based on this I would send home for outpatient management.  I would not do workup for syncope, stroke, ACS, PE at this point.  I doubt serious underlying fractures, intracerebral issues, spinal fractures.  The patient states that she regularly gets nauseas and that she gets relief with Zofran. She felt nauseas when she went to lay in bed when she slipped onto the floor and was unable to get up when she had emesis. She denies any fever, diarrhea or sick contacts and currently feels back to her baseline.     Critical Care time:  none    Diagnosis:    ICD-10-CM    1. Nausea and vomiting, intractability of vomiting not specified, unspecified vomiting type R11.2    2. Fall, initial encounter W19.XXXA        Disposition:  discharged to home    Discharge Medications:  New Prescriptions    No medications on file         Pk Collins  6/13/2018   [facility] EMERGENCY DEPARTMENT       Pk Collins PA-C  06/13/18 1728      Emergency Department Attending Supervision Note  6/13/2018  4:01 PM        I evaluated this patient in conjunction with Pk Collins PA-C        Briefly, the patient presented with nausea 30 min after eating lunch. Tried to lay down, slid off of her bed and got stuck between bed and dresser. Vomited while between bed and dresser. No head injury. Patient couldn't get up. Pushed life alert. Denies any pain. No additional vomiting. No nausea now (got Zofran by EMS)        On my exam,      Constitutional: Patient appears well-developed and well-nourished. Patient is in no acute distress  HENT:                         Head: No external signs of trauma noted.                        Eyes: Conjunctivae are normal. Pupils are equal, round, and reactive to light.    Cardiovascular:                         Normal rate, regular rhythm and normal heart sounds.                          Exam reveals no friction rub.                          No murmur heard.  Pulmonary/Chest:                         Effort normal and breath sounds normal.                         No respiratory distress.                         There are no wheezes.                         There are no rales.   Abdominal:                         Soft.                         Bowel sounds normal.                         There is no distension.                         There is no tenderness.                         There is no rebound or guarding.   Musculoskeletal:                         Normal range of motion.                         Normal Tone  Neurological: Patient is alert and oriented to person, place, and time.   Skin: Skin is warm and dry. Patient is not diaphoretic.        Indication: vomiting  Time: 1603  Vent. Rate 61 bpm. SD interval 166. QRS duration 152. QT/QTc 470/473. P-R-T axis 47 73 31.   normal sinus rhythm. Right bundle branch block. Abnormal ECG. No changes since 4/26/17. Read time: 1606.     Results:    Labs reviewed and EKG interpreted by me and compared to her old EKG.     ED course:    The patient remained well in the ED. No further interventions were needed.     My impression is vomiting and an accidental slide off the patient's bed without injury.         Impression:    ICD-10-CM    1. Nausea and vomiting, intractability of vomiting not specified, unspecified vomiting type R11.2 CBC with platelets differential     Magnesium     Troponin I     Basic metabolic panel   2. Fall, initial encounter W19.XXXA              Zeke Chavarria DO Burns, Bradley Joseph, DO  06/13/18 1828

## 2018-06-13 NOTE — DISCHARGE INSTRUCTIONS
Fall with Uncertain Cause  You have had a fall today. But the cause of your fall is not certain. Falls can happen due to slipping, tripping or losing your balance. A fall can also happen from a fainting spell or seizure.  While a fall can happen for a simple reason (tripping over something), falls in elderly people are often caused by a combination of things:    Age-related decline in function with worsening balance, stability, vision, and muscle strength    Chronic illness, such as heart arrhythmias, heart valve disease, vascular disease, COPD, diabetes, strokes, or arthritis    Shoes that do not give much support and make you prone to slip or slide    Anemia or low blood pressure     Effects or side effects of medicines    Dehydration or recent use of alcohol    Environmental hazards, such as uneven or slippery ground, unfamiliar place, obstacles, uneven surfaces, or slippery ground    Situational factors (related to the activity being done, such as rushing to the bathroom)  Because the cause of your fall today is not certain, it is possible that a fainting spell or seizure was the cause. This means that it could happen again, without warning. If you fall again, without a cause, then you should return to this facility promptly to have further tests. Otherwise, follow up with your healthcare provider as explained below.  It is normal to feel sore and tight in your muscles and back the next day, and not just the muscles you initially injured. Remember, all the parts of your body are connected, so while initially one area hurts, the next day another may hurt. Also, when you injure yourself, it causes inflammation, which then causes the muscles to tighten up and hurt more. After the initial worsening, it should gradually improve over the next few days. However, more severe pain should be reported.  Even without a definite head injury, you can still get a concussion. Concussions and even bleeding can still happen,  especially if you have had a recent injury or take blood thinner medicine. It is not unusual to have a mild headache and feel tired and even nauseous or dizzy.  Home care    Rest today and resume your normal activities as soon as you are feeling back to normal. It is best to remain with someone who can check on you for the next 24 hours to watch for another episode of falling.    If you were injured during the fall, follow the advice from your healthcare provider regarding care of your injury.    If you become lightheaded or dizzy, lie down right away or sit and lean forward with your head down.    As a precaution, don't drive a car or operate dangerous equipment, don't take a bath alone (use a shower instead), and don't swim alone until you see your healthcare provider. A condition causing fainting or seizures must be ruled out before resuming these activities.    You may use acetaminophen or ibuprofen to control pain, unless another pain medicine was prescribed. If you have chronic liver or kidney disease or ever had a stomach ulcer or gastrointestinal bleeding, talk with your healthcare provider before using these medicines.    Keep your appointments for any further testing that may have been scheduled for you.  Follow-up care  Follow up with your healthcare provider, or as advised.  If X-rays or CT scan were done, you will be notified if there is a change in the reading, especially if it affects treatment.  Call 911  Call 911 if any of these happen:    Trouble breathing    Confused or difficulty arousing    Fainting or loss of consciousness    Rapid or very slow heart rate    Seizure    Difficulty with speech or vision, weakness of an arm or leg    Difficulty walking or talking, loss of balance, numbness or weakness in one side of your body, facial droop  When to seek medical advice  Call your healthcare provider right away if any of these happen:    Another unexplained fall    Dizziness    Severe  headache    Nausea and vomiting    Blood in vomit, stools (black or red color)  Date Last Reviewed: 11/1/2017 2000-2017 [employer]. [address] [place] [address], [place] [address]. All rights reserved. This information is not intended as a substitute for professional medical care. Always follow your healthcare professional's instructions.

## 2018-06-13 NOTE — ED AVS SNAPSHOT
[facility] Emergency Department    [address]    [facility] [address]    Phone:  [phone]    Fax:  [phone]                                       Anastasiya Mota   MRN: [id number]    Department:  [facility] Emergency Department   Date of Visit:  6/13/2018           After Visit Summary Signature Page     I have received my discharge instructions, and my questions have been answered. I have discussed any challenges I see with this plan with the nurse or doctor.    ..........................................................................................................................................  Patient/Patient Representative Signature      ..........................................................................................................................................  Patient Representative Print Name and Relationship to Patient    ..................................................               ................................................  Date                                            Time    ..........................................................................................................................................  Reviewed by Signature/Title    ...................................................              ..............................................  Date                                                            Time

## 2018-06-13 NOTE — ED AVS SNAPSHOT
[facility]    [address] [place] [address]    Phone:  [phone]    Fax:  [phone]                                       Anastasiya Mota   MRN: [id number]    Department:  [facility]   Date of Visit:  6/13/2018           Patient Information     Date Of Birth          5/1/1929        Your diagnoses for this visit were:     Nausea and vomiting, intractability of vomiting not specified, unspecified vomiting type     Fall, initial encounter        You were seen by Zeke Chavarria DO.      Follow-up Information     Follow up with Clinic, [facility] In 1 week.    Why:  As needed    Contact information:    [address] [facility] [address] [phone]          Discharge Instructions         Fall with Uncertain Cause  You have had a fall today. But the cause of your fall is not certain. Falls can happen due to slipping, tripping or losing your balance. A fall can also happen from a fainting spell or seizure.  While a fall can happen for a simple reason (tripping over something), falls in elderly people are often caused by a combination of things:    Age-related decline in function with worsening balance, stability, vision, and muscle strength    Chronic illness, such as heart arrhythmias, heart valve disease, vascular disease, COPD, diabetes, strokes, or arthritis    Shoes that do not give much support and make you prone to slip or slide    Anemia or low blood pressure     Effects or side effects of medicines    Dehydration or recent use of alcohol    Environmental hazards, such as uneven or slippery ground, unfamiliar place, obstacles, uneven surfaces, or slippery ground    Situational factors (related to the activity being done, such as rushing to the bathroom)  Because the cause of your fall today is not certain, it is possible that a fainting spell or seizure was the cause. This means that  it could happen again, without warning. If you fall again, without a cause, then you should return to this facility promptly to have further tests. Otherwise, follow up with your healthcare provider as explained below.  It is normal to feel sore and tight in your muscles and back the next day, and not just the muscles you initially injured. Remember, all the parts of your body are connected, so while initially one area hurts, the next day another may hurt. Also, when you injure yourself, it causes inflammation, which then causes the muscles to tighten up and hurt more. After the initial worsening, it should gradually improve over the next few days. However, more severe pain should be reported.  Even without a definite head injury, you can still get a concussion. Concussions and even bleeding can still happen, especially if you have had a recent injury or take blood thinner medicine. It is not unusual to have a mild headache and feel tired and even nauseous or dizzy.  Home care    Rest today and resume your normal activities as soon as you are feeling back to normal. It is best to remain with someone who can check on you for the next 24 hours to watch for another episode of falling.    If you were injured during the fall, follow the advice from your healthcare provider regarding care of your injury.    If you become lightheaded or dizzy, lie down right away or sit and lean forward with your head down.    As a precaution, don't drive a car or operate dangerous equipment, don't take a bath alone (use a shower instead), and don't swim alone until you see your healthcare provider. A condition causing fainting or seizures must be ruled out before resuming these activities.    You may use acetaminophen or ibuprofen to control pain, unless another pain medicine was prescribed. If you have chronic liver or kidney disease or ever had a stomach ulcer or gastrointestinal bleeding, talk with your healthcare provider before using  these medicines.    Keep your appointments for any further testing that may have been scheduled for you.  Follow-up care  Follow up with your healthcare provider, or as advised.  If X-rays or CT scan were done, you will be notified if there is a change in the reading, especially if it affects treatment.  Call 911  Call 911 if any of these happen:    Trouble breathing    Confused or difficulty arousing    Fainting or loss of consciousness    Rapid or very slow heart rate    Seizure    Difficulty with speech or vision, weakness of an arm or leg    Difficulty walking or talking, loss of balance, numbness or weakness in one side of your body, facial droop  When to seek medical advice  Call your healthcare provider right away if any of these happen:    Another unexplained fall    Dizziness    Severe headache    Nausea and vomiting    Blood in vomit, stools (black or red color)  Date Last Reviewed: 11/1/2017 2000-2017 [employer]. [address] [place] [address]. All rights reserved. This information is not intended as a substitute for professional medical care. Always follow your healthcare professional's instructions.          Your next 10 appointments already scheduled     Jul 11, 2018  1:10 PM CDT   Return Visit with Linda Holbrook PA-C   [facility])    [address] [address] [address] [phone]              24 Hour Appointment Hotline       To make an appointment at any [facility], call [phone] ([phone]). If you don't have a family doctor or clinic, we will help you find one. [facility] are conveniently located to serve the needs of you and your family.             Review of your medicines      Our records show that you are taking the medicines listed below. If these are incorrect, please call your family doctor or clinic.        Dose / Directions Last dose taken     ACETAMINOPHEN PO   Dose:  1000 mg        Take 1,000 mg by mouth 3 times daily as needed   Refills:  0        ANASTROZOLE PO   Dose:  1 mg        Take 1 mg by mouth daily   Refills:  0        aspirin 81 MG EC tablet   Dose:  81 mg   Quantity:  90 tablet        Take 1 tablet (81 mg) by mouth daily   Refills:  3        atorvastatin 40 MG tablet   Commonly known as:  LIPITOR   Dose:  40 mg        Take 40 mg by mouth every evening   Refills:  0        carvedilol 12.5 MG tablet   Commonly known as:  COREG   Dose:  25 mg   Quantity:  60 tablet        Take 25 mg by mouth 2 times daily (with meals)   Refills:  3        cloNIDine 0.1 MG tablet   Commonly known as:  CATAPRES   Dose:  0.1 mg        Take 0.1 mg by mouth 2 times daily as needed   Refills:  0        diltiazem 120 MG 24 hr capsule   Commonly known as:  DILACOR XR   Dose:  120 mg   Quantity:  30 capsule        Take 1 capsule (120 mg) by mouth daily   Refills:  11        ferrous sulfate 325 (65 Fe) MG tablet   Commonly known as:  IRON   Dose:  325 mg        Take 325 mg by mouth daily (with breakfast)   Refills:  0        hydrochlorothiazide 12.5 MG capsule   Commonly known as:  MICROZIDE   Dose:  12.5 mg   Quantity:  90 capsule        Take 1 capsule (12.5 mg) by mouth daily   Refills:  3        LANSOPRAZOLE PO   Dose:  30 mg        Take 30 mg by mouth every morning (before breakfast)   Refills:  0        lisinopril 20 MG tablet   Commonly known as:  PRINIVIL/ZESTRIL   Dose:  20 mg   Quantity:  30 tablet        Take 1 tablet (20 mg) by mouth every evening   Refills:  3        melatonin 3 MG tablet   Dose:  3 mg        Take 3 mg by mouth nightly as needed for sleep   Refills:  0        polyethylene glycol powder   Commonly known as:  MIRALAX/GLYCOLAX   Dose:  17 g        Take 17 g by mouth daily   Refills:  0        SENEXON-S 8.6-50 MG per tablet   Dose:  2 tablet   Generic drug:  senna-docusate        Take 2 tablets by mouth 2 times daily   Refills:  0        ZOFRAN  PO   Dose:  4 mg        Take 4 mg by mouth every 8 hours as needed for nausea or vomiting   Refills:  0        ZOLOFT PO   Dose:  50 mg        Take 50 mg by mouth daily   Refills:  0                Procedures and tests performed during your visit     CBC with platelets differential    EKG 12-lead, tracing only    Magnesium    Troponin I      Orders Needing Specimen Collection     None      Pending Results     Date and Time Order Name Status Description    6/13/2018 1556 EKG 12-lead, tracing only Preliminary             Pending Culture Results     No orders found from 6/11/2018 to 6/14/2018.            Pending Results Instructions     If you had any lab results that were not finalized at the time of your Discharge, you can call the ED Lab Result RN at [phone]. You will be contacted by this team for any positive Lab results or changes in treatment. The nurses are available 7 days a week from 10A to 6:30P.  You can leave a message 24 hours per day and they will return your call.        Test Results From Your Hospital Stay        6/13/2018  4:53 PM      Component Results     Component Value Ref Range & Units Status    WBC 12.8 (H) 4.0 - 11.0 10e9/L Final    RBC Count 4.50 3.8 - 5.2 10e12/L Final    Hemoglobin 12.4 11.7 - 15.7 g/dL Final    Hematocrit 40.8 35.0 - 47.0 % Final    MCV 91 78 - 100 fl Final    MCH 27.6 26.5 - 33.0 pg Final    MCHC 30.4 (L) 31.5 - 36.5 g/dL Final    RDW 13.5 10.0 - 15.0 % Final    Platelet Count 217 150 - 450 10e9/L Final    Diff Method Automated Method  Final    % Neutrophils 80.7 % Final    % Lymphocytes 9.4 % Final    % Monocytes 8.1 % Final    % Eosinophils 1.0 % Final    % Basophils 0.3 % Final    % Immature Granulocytes 0.5 % Final    Nucleated RBCs 0 0 /100 Final    Absolute Neutrophil 10.3 (H) 1.6 - 8.3 10e9/L Final    Absolute Lymphocytes 1.2 0.8 - 5.3 10e9/L Final    Absolute Monocytes 1.0 0.0 - 1.3 10e9/L Final    Absolute Eosinophils 0.1 0.0 - 0.7 10e9/L Final    Absolute  Basophils 0.0 0.0 - 0.2 10e9/L Final    Abs Immature Granulocytes 0.1 0 - 0.4 10e9/L Final    Absolute Nucleated RBC 0.0  Final         6/13/2018  5:10 PM      Component Results     Component Value Ref Range & Units Status    Magnesium 1.8 1.6 - 2.3 mg/dL Final         6/13/2018  5:10 PM      Component Results     Component Value Ref Range & Units Status    Troponin I ES <0.015 0.000 - 0.045 ug/L Final    The 99th percentile for upper reference range is 0.045 ug/L.  Troponin values   in the range of 0.045 - 0.120 ug/L may be associated with risks of adverse   clinical events.                  Clinical Quality Measure: Blood Pressure Screening     Your blood pressure was checked while you were in the emergency department today. The last reading we obtained was  BP: (!) 131/108 . Please read the guidelines below about what these numbers mean and what you should do about them.  If your systolic blood pressure (the top number) is less than 120 and your diastolic blood pressure (the bottom number) is less than 80, then your blood pressure is normal. There is nothing more that you need to do about it.  If your systolic blood pressure (the top number) is 120-139 or your diastolic blood pressure (the bottom number) is 80-89, your blood pressure may be higher than it should be. You should have your blood pressure rechecked within a year by a primary care provider.  If your systolic blood pressure (the top number) is 140 or greater or your diastolic blood pressure (the bottom number) is 90 or greater, you may have high blood pressure. High blood pressure is treatable, but if left untreated over time it can put you at risk for heart attack, stroke, or kidney failure. You should have your blood pressure rechecked by a primary care provider within the next 4 weeks.  If your provider in the emergency department today gave you specific instructions to follow-up with your doctor or provider even sooner than that, you should follow  "that instruction and not wait for up to 4 weeks for your follow-up visit.        Thank you for choosing [facility]       Thank you for choosing [facility] for your care. Our goal is always to provide you with excellent care. Hearing back from our patients is one way we can continue to improve our services. Please take a few minutes to complete the written survey that you may receive in the mail after you visit with us. Thank you!        [employer] Information     [employer] lets you send messages to your doctor, view your test results, renew your prescriptions, schedule appointments and more. To sign up, go to [email or web address] . Click on \"Log in\" on the left side of the screen, which will take you to the Welcome page. Then click on \"Sign up Now\" on the right side of the page.     You will be asked to enter the access code listed below, as well as some personal information. Please follow the directions to create your username and password.     Your access code is: [email or web address]  Expires: 2018  3:44 PM     Your access code will  in 90 days. If you need help or a new code, please call your [facility] clinic or [phone].        Care EveryWhere ID     This is your Care EveryWhere ID. This could be used by other organizations to access your [facility] medical records  [id number]        Equal Access to Services     LARA OLIVEIRA : Hadjoseph Solis, waaxda luqadaha, qaybta kaalmada marlen, jeremias oneill . So [facility] [phone].    ATENCIÓN: Si habla español, tiene a vasquez disposición servicios gratuitos de asistencia lingüística. Llame al [phone].    We comply with applicable federal civil rights laws and [place] laws. We do not discriminate on the basis of race, color, national origin, age, disability, sex, sexual orientation, or gender identity.            After Visit Summary       This is your record. Keep this with you and show to your community pharmacist(s) and " doctor(s) at your next visit.

## 2018-06-13 NOTE — ED TRIAGE NOTES
Patient arrived at around 15:30 complaining of nausea and vomiting. Patient found sitting on the floor after she pressed her alert button at her assisted living center. Patient denies hitting head or falling. Reports sliding off of bed when she vomited. Reports feeling well this morning but felt nauseated and tired after lunch and went upstair. Patient vomited on bed. Patient given zofran by EMS. Patient currently denies nausea. Patient reports ABCs intact. Alert and oriented X3-4. History of dementia and expressive aphagia. Current behaviors at baseline per daughter who is supportive at bedside. Oriented to room and call light. Patient and family educated about hand hygiene practices.

## 2018-06-13 NOTE — ED NOTES
Bed: ED23  Expected date: 6/13/18  Expected time: 3:06 PM  Means of arrival: Ambulance  Comments:  NEO

## 2018-06-14 LAB — INTERPRETATION ECG - MUSE: NORMAL

## 2018-06-18 ENCOUNTER — DOCUMENTATION ONLY (OUTPATIENT)
Dept: OTHER | Facility: CLINIC | Age: 83
End: 2018-06-18

## 2018-06-18 PROBLEM — Z71.89 ACP (ADVANCE CARE PLANNING): Chronic | Status: ACTIVE | Noted: 2018-06-18

## 2018-07-11 ENCOUNTER — OFFICE VISIT (OUTPATIENT)
Dept: CARDIOLOGY | Facility: CLINIC | Age: 83
End: 2018-07-11
Attending: PHYSICIAN ASSISTANT
Payer: MEDICARE

## 2018-07-11 VITALS
BODY MASS INDEX: 38.7 KG/M2 | OXYGEN SATURATION: 96 % | HEART RATE: 68 BPM | SYSTOLIC BLOOD PRESSURE: 91 MMHG | WEIGHT: 226.7 LBS | DIASTOLIC BLOOD PRESSURE: 58 MMHG | HEIGHT: 64 IN

## 2018-07-11 DIAGNOSIS — R60.0 LOWER EXTREMITY EDEMA: ICD-10-CM

## 2018-07-11 DIAGNOSIS — I10 BENIGN ESSENTIAL HYPERTENSION: ICD-10-CM

## 2018-07-11 PROCEDURE — 99213 OFFICE O/P EST LOW 20 MIN: CPT | Performed by: PHYSICIAN ASSISTANT

## 2018-07-11 RX ORDER — BISACODYL 10 MG
10 SUPPOSITORY, RECTAL RECTAL DAILY PRN
COMMUNITY

## 2018-07-11 NOTE — PATIENT INSTRUCTIONS
No med changes.   BP looks better, more controlled at facility.   HR looks better, up a bit.   See Dr. Barajas in December with echo and lab. Call if breathing worsens or if any questions.

## 2018-07-11 NOTE — LETTER
2018    [facility]  [address] [facility] [address]    RE: Anastasiya Sagastume Svetlana       Dear Colleague,    I had the pleasure of seeing Anastasiya Mota in the [facility].    CARDIOLOGY CLINIC PROGRESS NOTE    DOS: 18    Anastasiya Mota  : 1929, 88 year old  MRN: [id number]    Primary cardiologist: Seen previously by EP only--Dr. Peterson.  Now established with Dr. Barajas.      History:  I had the pleasure of following up with Anastasiya Mota along with her daughter today in the cardiology clinic.      Anastasiya is a pleasant 89 year old female with a PMHx of pSVT and aflutter, dating back to  after a prolonged hospitalization at [place] for a GI bleed, at which time she had a NSTEMI, ASAD, and developed tachycardia, felt to be SVT/aflutter. She was then admitted at [facility] in 2016, noted to be tachycardic once again, placed on diltiazem, and subsequently amiodarone after being seen by EP. In 2017 her amio was discontinued, and she underwent a successful SVT ablation in 2017. She was seen back in follow up by EP in April, and was doing well, with no further tachyarrythmias on event monitor. Her anticoagulation was ultimately stopped once again due to GI bleed concerns and she is now only on an ASA. She was then to be followed on a PRN basis.    Also h/o CVA in 2015.      Of note, her last echo was in Dec 2016, and at that time her EF was 60-65% with no regional wall motion abnormalities. No significant valvular abnormalities noted.      Due to hypertension at her assisted living facility (SBPs 153-170), she saw Nhung Smith NP 10/19/17.  At that visit, she was on Lisinopril 30mg daily, Coreg 25mg BID, HCTZ 25mg daily, diltiazem 240 mg, and clonidine PRN. She noted some QUINTANILLA and some edema. Nhung added in spironolactone 12.5 mg daily. A BMP drawn about 2 weeks later (10/30/17) showed the creat had gone from 1.31 to  1.57. Due to better controlled BPs and the renal dysfunction, her lisinopril was decreased. Renal function remained elevated, so HCTZ decreased and held. She saw Dr. Barajas 12/15/17. Due to her renal function and her history of breast cancer on hormonal therapy, spironolactone was discontinued and HCTZ 12.5 mg was added back. The facility did add back HCTZ but did not initially stop the spironolactone.  BMP showed BUN WNL and creat elevated but stable at 1.6. Off the spironolactone, creat was 1.5.     In follow up, BPs were controlled, and edema was improved.   Due to some QUINTANILLA and edema and low HRs, we decreased diltiazem from 240 mg to 180 mg.     I last saw Anastasiya 5/4/18.  Clonidine was icnreased from 0.1 mg BID PRN to BID for uncontrolled BPs, though in clinic here her BPs have been controlled. I decreased dilt to 120 mg due to some continued slow HRs and some continued QUINTANILLA.     Since I last saw Anastasiya, clonidine has been increased to 0.2 mg BID by her facility.     She presents today for follow up.   Her BPs after increase in clonidine are better controlled. They are mostly < 110-130s in the afternoon.  The AM BPs are at the time she gets her meds in the morning.  They are still a bit labile.  HRs have gone up from low 50s to mid 60s.   Weights: 4/20/18: 238 lbs, 5/25/18: 234 lbs, 6/29/18: 225 lbs   Her edema is improved/resolved with the decrease in diltiazem.   Her QUINTANILLA is about the same - still has QUINTANILLA with a lot of activity, but can use the stairs and walk the hallways. She can stop to rest if she needs.    She has no chest pain.   No major complaints or concerns.         ROS:  Skin:  Negative     Eyes:  Positive for glasses;cataracts  ENT:  Positive for hearing loss  Respiratory:  Positive for dyspnea on exertion  Cardiovascular:    Positive for;fatigue  Gastroenterology: Positive for constipation;nausea  Genitourinary:  Negative    Musculoskeletal:  Negative    Neurologic:  Positive for memory  problems  Psychiatric:  Negative    Heme/Lymph/Imm:  Negative    Endocrine:  Negative      PAST MEDICAL HISTORY:  Past Medical History:   Diagnosis Date     Acid reflux      Atrial fibrillation (H)      Breast cancer (H)      Cancer (H)      Cerebral infarction (H)      Constipation      Elevated cholesterol      Hypertension      Nausea      Paroxysmal supraventricular tachycardia (H)      SVT (supraventricular tachycardia) (H)        PAST SURGICAL HISTORY:  No past surgical history on file.    SOCIAL HISTORY:  Social History     Social History     Marital status:      Spouse name: N/A     Number of children: N/A     Years of education: N/A     Social History Main Topics     Smoking status: Never Smoker     Smokeless tobacco: Never Used     Alcohol use No     Drug use: No     Sexual activity: Not Asked     Other Topics Concern     Caffeine Concern No     Special Diet No     regular diet      Exercise No     Social History Narrative       FAMILY HISTORY:  Family History   Problem Relation Age of Onset     No Known Problems Mother      No Known Problems Father      Unknown/Adopted No family hx of        MEDS:   Current Outpatient Prescriptions on File Prior to Visit:  ACETAMINOPHEN PO Take 1,000 mg by mouth 3 times daily as needed    ANASTROZOLE PO Take 1 mg by mouth daily    aspirin 81 MG EC tablet Take 1 tablet (81 mg) by mouth daily   atorvastatin (LIPITOR) 40 MG tablet Take 40 mg by mouth every evening    carvedilol (COREG) 12.5 MG tablet Take 25 mg by mouth 2 times daily (with meals)    cloNIDine (CATAPRES) 0.1 MG tablet Take 0.1 mg by mouth 2 times daily as needed   diltiazem (DILACOR XR) 120 MG 24 hr capsule Take 1 capsule (120 mg) by mouth daily   ferrous sulfate (IRON) 325 (65 FE) MG tablet Take 325 mg by mouth daily (with breakfast)    hydrochlorothiazide (MICROZIDE) 12.5 MG capsule Take 1 capsule (12.5 mg) by mouth daily   LANSOPRAZOLE PO Take 30 mg by mouth every morning (before breakfast)   "  lisinopril (PRINIVIL/ZESTRIL) 20 MG tablet Take 1 tablet (20 mg) by mouth every evening   melatonin 3 MG tablet Take 3 mg by mouth nightly as needed for sleep   Ondansetron HCl (ZOFRAN PO) Take 4 mg by mouth every 8 hours as needed for nausea or vomiting   polyethylene glycol (MIRALAX/GLYCOLAX) powder Take 17 g by mouth daily   senna-docusate (SENEXON-S) 8.6-50 MG per tablet Take 2 tablets by mouth 2 times daily   Sertraline HCl (ZOLOFT PO) Take 50 mg by mouth daily     No current facility-administered medications on file prior to visit.     ALLERGIES: No Known Allergies    PHYSICAL EXAM:  Vitals: BP 91/58 (BP Location: Right arm, Patient Position: Chair, Cuff Size: Adult Large)  Pulse 68  Ht 1.626 m (5' 4\")  Wt 102.8 kg (226 lb 11.2 oz)  SpO2 96%  BMI 38.91 kg/m2  Constitutional:  cooperative, alert and oriented, well developed, well nourished, in no acute distress        Skin:  warm and dry to the touch, no apparent skin lesions or masses noted        Head:  normocephalic, no masses or lesions        Eyes:  pupils equal and round;conjunctivae and lids unremarkable        ENT:  no pallor or cyanosis        Neck:  JVP normal        Respiratory:  normal breath sounds, clear to auscultation, normal A-P diameter, normal symmetry, normal respiratory excursion, no use of accessory muscles        Cardiac: regular rhythm;no murmurs, gallops or rubs detected                  GI:  abdomen soft;BS normoactive obese      Vascular:                                        Extremities and Musculoskeletal:  no deformities, clubbing, cyanosis, erythema observed;no edema   walks with walker     Neurological:  no gross motor deficits;affect appropriate aphasia        LABS/DATA:  I reviewed the following:  No new labs today      ASSESSMENT/PLAN:  1.  Hypertension.  --Reportedly somewhat difficult to control in the past.   --Controlled on lisinopril 20 mg QHS, HCTZ 12.5 mg, diltiazem 120 mg, Coreg 25 mg BID, clonidine 0.2 mg BID. "    --Continue to follow BP. I have asked facility to check in AM but also in afternoon daily (after meds).   --In the future, may need to decrease/DC HCTZ and/or lisinopril due to renal function. Currently creat is stable  --Noted is she is off spironolactone due to h/o breast cancer on hormonal therapy.               2.  Hx of SVT/aflutter, s/p ablation.   --Clinically do not suspect recurrence of tachyarrythmia.   --Chronically remains on diltiazem and Coreg.      --Given hx of GIB she remains off full AC, and on ASA only.       3.  Mild QUINTANILLA.   --After SVT ablation, she was in SR with HRs 50-60s, and maintained on Coreg and diltiazem.  QUINTANILLA could be related to chronotropic incompetence. Have decreased diltiazem to 120 mg.  HRs now mid/upper 60s and feels about the same.  In the future, we may discontinue.     --Could be deconditioning  --No chest pain  --Will get an echo in December when she sees Dr. Barajas     4.  Edema.   --Resolved with decrease in diltiazem.    --Is on HCTZ 12.5 mg.      5.  CKD.   --Last creat 6/13/18: 1.55, BUN 21  --Repeat in December         Follow up Dr. Barajas in December with echo and labs        Thank you for allowing me to participate in the care of your patient.    Sincerely,     Linda Holbrook PA-C     [facility]

## 2018-07-11 NOTE — LETTER
2018    [facility]  [address] [facility] [address]    RE: Anastasiya Sagastume Svetlana       Dear Colleague,    I had the pleasure of seeing Anastasiya Mota in the [facility].    CARDIOLOGY CLINIC PROGRESS NOTE    DOS: 18    Anastasiya Mota  : 1929, 88 year old  MRN: [id number]    Primary cardiologist: Seen previously by EP only--Dr. Peterson.  Now established with Dr. Barajas.      History:  I had the pleasure of following up with Anastasiya Mota along with her daughter today in the cardiology clinic.      Anastasiya is a pleasant 89 year old female with a PMHx of pSVT and aflutter, dating back to  after a prolonged hospitalization at [facility] for a GI bleed, at which time she had a NSTEMI, ASAD, and developed tachycardia, felt to be SVT/aflutter. She was then admitted at [facility] in 2016, noted to be tachycardic once again, placed on diltiazem, and subsequently amiodarone after being seen by EP. In 2017 her amio was discontinued, and she underwent a successful SVT ablation in 2017. She was seen back in follow up by EP in April, and was doing well, with no further tachyarrythmias on event monitor. Her anticoagulation was ultimately stopped once again due to GI bleed concerns and she is now only on an ASA. She was then to be followed on a PRN basis.    Also h/o CVA in 2015.      Of note, her last echo was in Dec 2016, and at that time her EF was 60-65% with no regional wall motion abnormalities. No significant valvular abnormalities noted.      Due to hypertension at her assisted living facility (SBPs 153-170), she saw Nhung Smith NP 10/19/17.  At that visit, she was on Lisinopril 30mg daily, Coreg 25mg BID, HCTZ 25mg daily, diltiazem 240 mg, and clonidine PRN. She noted some QUINTANILLA and some edema. Nhung added in spironolactone 12.5 mg daily. A BMP drawn about 2 weeks later (10/30/17) showed the creat had gone from 1.31 to  1.57. Due to better controlled BPs and the renal dysfunction, her lisinopril was decreased. Renal function remained elevated, so HCTZ decreased and held. She saw Dr. Barajas 12/15/17. Due to her renal function and her history of breast cancer on hormonal therapy, spironolactone was discontinued and HCTZ 12.5 mg was added back. The facility did add back HCTZ but did not initially stop the spironolactone.  BMP showed BUN WNL and creat elevated but stable at 1.6. Off the spironolactone, creat was 1.5.     In follow up, BPs were controlled, and edema was improved.   Due to some QUINTANILLA and edema and low HRs, we decreased diltiazem from 240 mg to 180 mg.     I last saw Anastasiya 5/4/18.  Clonidine was icnreased from 0.1 mg BID PRN to BID for uncontrolled BPs, though in clinic here her BPs have been controlled. I decreased dilt to 120 mg due to some continued slow HRs and some continued QUINTANILLA.     Since I last saw Anastasiya, clonidine has been increased to 0.2 mg BID by her facility.     She presents today for follow up.   Her BPs after increase in clonidine are better controlled. They are mostly < 110-130s in the afternoon.  The AM BPs are at the time she gets her meds in the morning.  They are still a bit labile.  HRs have gone up from low 50s to mid 60s.   Weights: 4/20/18: 238 lbs, 5/25/18: 234 lbs, 6/29/18: 225 lbs   Her edema is improved/resolved with the decrease in diltiazem.   Her QUINTANILLA is about the same - still has QUINTANILLA with a lot of activity, but can use the stairs and walk the hallways. She can stop to rest if she needs.    She has no chest pain.   No major complaints or concerns.         ROS:  Skin:  Negative     Eyes:  Positive for glasses;cataracts  ENT:  Positive for hearing loss  Respiratory:  Positive for dyspnea on exertion  Cardiovascular:    Positive for;fatigue  Gastroenterology: Positive for constipation;nausea  Genitourinary:  Negative    Musculoskeletal:  Negative    Neurologic:  Positive for memory  problems  Psychiatric:  Negative    Heme/Lymph/Imm:  Negative    Endocrine:  Negative      PAST MEDICAL HISTORY:  Past Medical History:   Diagnosis Date     Acid reflux      Atrial fibrillation (H)      Breast cancer (H)      Cancer (H)      Cerebral infarction (H)      Constipation      Elevated cholesterol      Hypertension      Nausea      Paroxysmal supraventricular tachycardia (H)      SVT (supraventricular tachycardia) (H)        PAST SURGICAL HISTORY:  No past surgical history on file.    SOCIAL HISTORY:  Social History     Social History     Marital status:      Spouse name: N/A     Number of children: N/A     Years of education: N/A     Social History Main Topics     Smoking status: Never Smoker     Smokeless tobacco: Never Used     Alcohol use No     Drug use: No     Sexual activity: Not Asked     Other Topics Concern     Caffeine Concern No     Special Diet No     regular diet      Exercise No     Social History Narrative       FAMILY HISTORY:  Family History   Problem Relation Age of Onset     No Known Problems Mother      No Known Problems Father      Unknown/Adopted No family hx of        MEDS:   Current Outpatient Prescriptions on File Prior to Visit:  ACETAMINOPHEN PO Take 1,000 mg by mouth 3 times daily as needed    ANASTROZOLE PO Take 1 mg by mouth daily    aspirin 81 MG EC tablet Take 1 tablet (81 mg) by mouth daily   atorvastatin (LIPITOR) 40 MG tablet Take 40 mg by mouth every evening    carvedilol (COREG) 12.5 MG tablet Take 25 mg by mouth 2 times daily (with meals)    cloNIDine (CATAPRES) 0.1 MG tablet Take 0.1 mg by mouth 2 times daily as needed   diltiazem (DILACOR XR) 120 MG 24 hr capsule Take 1 capsule (120 mg) by mouth daily   ferrous sulfate (IRON) 325 (65 FE) MG tablet Take 325 mg by mouth daily (with breakfast)    hydrochlorothiazide (MICROZIDE) 12.5 MG capsule Take 1 capsule (12.5 mg) by mouth daily   LANSOPRAZOLE PO Take 30 mg by mouth every morning (before breakfast)   "  lisinopril (PRINIVIL/ZESTRIL) 20 MG tablet Take 1 tablet (20 mg) by mouth every evening   melatonin 3 MG tablet Take 3 mg by mouth nightly as needed for sleep   Ondansetron HCl (ZOFRAN PO) Take 4 mg by mouth every 8 hours as needed for nausea or vomiting   polyethylene glycol (MIRALAX/GLYCOLAX) powder Take 17 g by mouth daily   senna-docusate (SENEXON-S) 8.6-50 MG per tablet Take 2 tablets by mouth 2 times daily   Sertraline HCl (ZOLOFT PO) Take 50 mg by mouth daily     No current facility-administered medications on file prior to visit.     ALLERGIES: No Known Allergies    PHYSICAL EXAM:  Vitals: BP 91/58 (BP Location: Right arm, Patient Position: Chair, Cuff Size: Adult Large)  Pulse 68  Ht 1.626 m (5' 4\")  Wt 102.8 kg (226 lb 11.2 oz)  SpO2 96%  BMI 38.91 kg/m2  Constitutional:  cooperative, alert and oriented, well developed, well nourished, in no acute distress        Skin:  warm and dry to the touch, no apparent skin lesions or masses noted        Head:  normocephalic, no masses or lesions        Eyes:  pupils equal and round;conjunctivae and lids unremarkable        ENT:  no pallor or cyanosis        Neck:  JVP normal        Respiratory:  normal breath sounds, clear to auscultation, normal A-P diameter, normal symmetry, normal respiratory excursion, no use of accessory muscles        Cardiac: regular rhythm;no murmurs, gallops or rubs detected                  GI:  abdomen soft;BS normoactive obese      Vascular:                                        Extremities and Musculoskeletal:  no deformities, clubbing, cyanosis, erythema observed;no edema   walks with walker     Neurological:  no gross motor deficits;affect appropriate aphasia        LABS/DATA:  I reviewed the following:  No new labs today      ASSESSMENT/PLAN:  1.  Hypertension.  --Reportedly somewhat difficult to control in the past.   --Controlled on lisinopril 20 mg QHS, HCTZ 12.5 mg, diltiazem 120 mg, Coreg 25 mg BID, clonidine 0.2 mg BID. "    --Continue to follow BP. I have asked facility to check in AM but also in afternoon daily (after meds).   --In the future, may need to decrease/DC HCTZ and/or lisinopril due to renal function. Currently creat is stable  --Noted is she is off spironolactone due to h/o breast cancer on hormonal therapy.               2.  Hx of SVT/aflutter, s/p ablation.   --Clinically do not suspect recurrence of tachyarrythmia.   --Chronically remains on diltiazem and Coreg.      --Given hx of GIB she remains off full AC, and on ASA only.       3.  Mild QUINTANILLA.   --After SVT ablation, she was in SR with HRs 50-60s, and maintained on Coreg and diltiazem.  QUINTANILLA could be related to chronotropic incompetence. Have decreased diltiazem to 120 mg.  HRs now mid/upper 60s and feels about the same.  In the future, we may discontinue.     --Could be deconditioning  --No chest pain  --Will get an echo in December when she sees Dr. Barajas     4.  Edema.   --Resolved with decrease in diltiazem.    --Is on HCTZ 12.5 mg.      5.  CKD.   --Last creat 6/13/18: 1.55, BUN 21  --Repeat in December         Follow up Dr. Barajas in December with echo and labs      Linda Holbrook PA-C    Thank you for allowing me to participate in the care of your patient.      Sincerely,     Linda Holbrook PA-C     [facility]    cc:   Linda Holbrook PA-C  [address] [place] [address]

## 2018-07-11 NOTE — PROGRESS NOTES
CARDIOLOGY CLINIC PROGRESS NOTE    DOS: 18    Anastasiya Mota  : 1929, 88 year old  MRN: [id number]    Primary cardiologist: Seen previously by EP only--Dr. Peterson.  Now established with Dr. Barajas.      History:  I had the pleasure of following up with Anastasiya Mota along with her daughter today in the cardiology clinic.      Anastasiya is a pleasant 89 year old female with a PMHx of pSVT and aflutter, dating back to  after a prolonged hospitalization at [place] for a GI bleed, at which time she had a NSTEMI, ASAD, and developed tachycardia, felt to be SVT/aflutter. She was then admitted at [facility] in 2016, noted to be tachycardic once again, placed on diltiazem, and subsequently amiodarone after being seen by EP. In 2017 her amio was discontinued, and she underwent a successful SVT ablation in 2017. She was seen back in follow up by EP in April, and was doing well, with no further tachyarrythmias on event monitor. Her anticoagulation was ultimately stopped once again due to GI bleed concerns and she is now only on an ASA. She was then to be followed on a PRN basis.    Also h/o CVA in 2015.      Of note, her last echo was in Dec 2016, and at that time her EF was 60-65% with no regional wall motion abnormalities. No significant valvular abnormalities noted.      Due to hypertension at her assisted living facility (SBPs 153-170), she saw Nhung Smith NP 10/19/17.  At that visit, she was on Lisinopril 30mg daily, Coreg 25mg BID, HCTZ 25mg daily, diltiazem 240 mg, and clonidine PRN. She noted some QUINTANILLA and some edema. Nhung added in spironolactone 12.5 mg daily. A BMP drawn about 2 weeks later (10/30/17) showed the creat had gone from 1.31 to 1.57. Due to better controlled BPs and the renal dysfunction, her lisinopril was decreased. Renal function remained elevated, so HCTZ decreased and held. She saw Dr. Barajas 12/15/17. Due to her renal function and her history of breast cancer on  hormonal therapy, spironolactone was discontinued and HCTZ 12.5 mg was added back. The facility did add back HCTZ but did not initially stop the spironolactone.  BMP showed BUN WNL and creat elevated but stable at 1.6. Off the spironolactone, creat was 1.5.     In follow up, BPs were controlled, and edema was improved.   Due to some QUINTANILLA and edema and low HRs, we decreased diltiazem from 240 mg to 180 mg.     I last saw Anastasiya 5/4/18.  Clonidine was icnreased from 0.1 mg BID PRN to BID for uncontrolled BPs, though in clinic here her BPs have been controlled. I decreased dilt to 120 mg due to some continued slow HRs and some continued QUINTANILLA.     Since I last saw Anastasiya, clonidine has been increased to 0.2 mg BID by her facility.     She presents today for follow up.   Her BPs after increase in clonidine are better controlled. They are mostly < 110-130s in the afternoon.  The AM BPs are at the time she gets her meds in the morning.  They are still a bit labile.  HRs have gone up from low 50s to mid 60s.   Weights: 4/20/18: 238 lbs, 5/25/18: 234 lbs, 6/29/18: 225 lbs   Her edema is improved/resolved with the decrease in diltiazem.   Her QUINTANILLA is about the same - still has QUINTANILLA with a lot of activity, but can use the stairs and walk the hallways. She can stop to rest if she needs.    She has no chest pain.   No major complaints or concerns.         ROS:  Skin:  Negative     Eyes:  Positive for glasses;cataracts  ENT:  Positive for hearing loss  Respiratory:  Positive for dyspnea on exertion  Cardiovascular:    Positive for;fatigue  Gastroenterology: Positive for constipation;nausea  Genitourinary:  Negative    Musculoskeletal:  Negative    Neurologic:  Positive for memory problems  Psychiatric:  Negative    Heme/Lymph/Imm:  Negative    Endocrine:  Negative      PAST MEDICAL HISTORY:  Past Medical History:   Diagnosis Date     Acid reflux      Atrial fibrillation (H)      Breast cancer (H)      Cancer (H)      Cerebral infarction  (H)      Constipation      Elevated cholesterol      Hypertension      Nausea      Paroxysmal supraventricular tachycardia (H)      SVT (supraventricular tachycardia) (H)        PAST SURGICAL HISTORY:  No past surgical history on file.    SOCIAL HISTORY:  Social History     Social History     Marital status:      Spouse name: N/A     Number of children: N/A     Years of education: N/A     Social History Main Topics     Smoking status: Never Smoker     Smokeless tobacco: Never Used     Alcohol use No     Drug use: No     Sexual activity: Not Asked     Other Topics Concern     Caffeine Concern No     Special Diet No     regular diet      Exercise No     Social History Narrative       FAMILY HISTORY:  Family History   Problem Relation Age of Onset     No Known Problems Mother      No Known Problems Father      Unknown/Adopted No family hx of        MEDS:   Current Outpatient Prescriptions on File Prior to Visit:  ACETAMINOPHEN PO Take 1,000 mg by mouth 3 times daily as needed    ANASTROZOLE PO Take 1 mg by mouth daily    aspirin 81 MG EC tablet Take 1 tablet (81 mg) by mouth daily   atorvastatin (LIPITOR) 40 MG tablet Take 40 mg by mouth every evening    carvedilol (COREG) 12.5 MG tablet Take 25 mg by mouth 2 times daily (with meals)    cloNIDine (CATAPRES) 0.1 MG tablet Take 0.1 mg by mouth 2 times daily as needed   diltiazem (DILACOR XR) 120 MG 24 hr capsule Take 1 capsule (120 mg) by mouth daily   ferrous sulfate (IRON) 325 (65 FE) MG tablet Take 325 mg by mouth daily (with breakfast)    hydrochlorothiazide (MICROZIDE) 12.5 MG capsule Take 1 capsule (12.5 mg) by mouth daily   LANSOPRAZOLE PO Take 30 mg by mouth every morning (before breakfast)    lisinopril (PRINIVIL/ZESTRIL) 20 MG tablet Take 1 tablet (20 mg) by mouth every evening   melatonin 3 MG tablet Take 3 mg by mouth nightly as needed for sleep   Ondansetron HCl (ZOFRAN PO) Take 4 mg by mouth every 8 hours as needed for nausea or vomiting  "  polyethylene glycol (MIRALAX/GLYCOLAX) powder Take 17 g by mouth daily   senna-docusate (SENEXON-S) 8.6-50 MG per tablet Take 2 tablets by mouth 2 times daily   Sertraline HCl (ZOLOFT PO) Take 50 mg by mouth daily     No current facility-administered medications on file prior to visit.     ALLERGIES: No Known Allergies    PHYSICAL EXAM:  Vitals: BP 91/58 (BP Location: Right arm, Patient Position: Chair, Cuff Size: Adult Large)  Pulse 68  Ht 1.626 m (5' 4\")  Wt 102.8 kg (226 lb 11.2 oz)  SpO2 96%  BMI 38.91 kg/m2  Constitutional:  cooperative, alert and oriented, well developed, well nourished, in no acute distress        Skin:  warm and dry to the touch, no apparent skin lesions or masses noted        Head:  normocephalic, no masses or lesions        Eyes:  pupils equal and round;conjunctivae and lids unremarkable        ENT:  no pallor or cyanosis        Neck:  JVP normal        Respiratory:  normal breath sounds, clear to auscultation, normal A-P diameter, normal symmetry, normal respiratory excursion, no use of accessory muscles        Cardiac: regular rhythm;no murmurs, gallops or rubs detected                  GI:  abdomen soft;BS normoactive obese      Vascular:                                        Extremities and Musculoskeletal:  no deformities, clubbing, cyanosis, erythema observed;no edema   walks with walker     Neurological:  no gross motor deficits;affect appropriate aphasia        LABS/DATA:  I reviewed the following:  No new labs today      ASSESSMENT/PLAN:  1.  Hypertension.  --Reportedly somewhat difficult to control in the past.   --Controlled on lisinopril 20 mg QHS, HCTZ 12.5 mg, diltiazem 120 mg, Coreg 25 mg BID, clonidine 0.2 mg BID.    --Continue to follow BP. I have asked facility to check in AM but also in afternoon daily (after meds).   --In the future, may need to decrease/DC HCTZ and/or lisinopril due to renal function. Currently creat is stable  --Noted is she is off " spironolactone due to h/o breast cancer on hormonal therapy.               2.  Hx of SVT/aflutter, s/p ablation.   --Clinically do not suspect recurrence of tachyarrythmia.   --Chronically remains on diltiazem and Coreg.      --Given hx of GIB she remains off full AC, and on ASA only.       3.  Mild QUINTANILLA.   --After SVT ablation, she was in SR with HRs 50-60s, and maintained on Coreg and diltiazem.  QUINTANILLA could be related to chronotropic incompetence. Have decreased diltiazem to 120 mg.  HRs now mid/upper 60s and feels about the same.  In the future, we may discontinue.     --Could be deconditioning  --No chest pain  --Will get an echo in December when she sees Dr. Barajas     4.  Edema.   --Resolved with decrease in diltiazem.    --Is on HCTZ 12.5 mg.      5.  CKD.   --Last creat 6/13/18: 1.55, BUN 21  --Repeat in December         Follow up Dr. Barajas in December with echo and labs      Linda Holbrook PA-C

## 2018-07-11 NOTE — MR AVS SNAPSHOT
After Visit Summary   7/11/2018    Anastasiya Mota    MRN: [id number]           Patient Information     Date Of Birth          5/1/1929        Visit Information        Provider Department      7/11/2018 1:10 PM Linda Holbrook PA-C [facility]        Today's Diagnoses     Lower extremity edema        Benign essential hypertension          Care Instructions    No med changes.   BP looks better, more controlled at facility.   HR looks better, up a bit.   See Dr. Barajas in December with echo and lab. Call if breathing worsens or if any questions.             Follow-ups after your visit        Additional Services     Follow-Up with Cardiologist                 Future tests that were ordered for you today     Open Future Orders        Priority Expected Expires Ordered    Basic metabolic panel Routine 12/8/2018 7/11/2019 7/11/2018    Echocardiogram Routine 12/8/2018 7/11/2019 7/11/2018    Follow-Up with Cardiologist Routine 12/8/2018 7/11/2019 7/11/2018            Who to contact     If you have questions or need follow up information about today's clinic visit or your schedule please contact [facility] directly at [phone].  Normal or non-critical lab and imaging results will be communicated to you by MyChart, letter or phone within 4 business days after the clinic has received the results. If you do not hear from us within 7 days, please contact the clinic through MyChart or phone. If you have a critical or abnormal lab result, we will notify you by phone as soon as possible.  Submit refill requests through [employer] or call your pharmacy and they will forward the refill request to us. Please allow 3 business days for your refill to be completed.          Additional Information About Your Visit        Care EveryWhere ID     This is your Care EveryWhere ID. This could be used by other organizations to access your  "[place] medical records  [id number]        Your Vitals Were     Pulse Height Pulse Oximetry BMI (Body Mass Index)          68 1.626 m (5' 4\") 96% 38.91 kg/m2         Blood Pressure from Last 3 Encounters:   07/11/18 91/58   06/13/18 133/55   05/04/18 118/68    Weight from Last 3 Encounters:   07/11/18 102.8 kg (226 lb 11.2 oz)   05/04/18 106.3 kg (234 lb 6.4 oz)   04/05/18 108.7 kg (239 lb 11.2 oz)              We Performed the Following     Follow-Up with Cardiac Advanced Practice Provider        Primary Care Provider Office Phone # Fax #    [email or web address] [phone] [address] [facility] [address]        Equal Access to Services     LARA OLIVEIRA : Hadii aad ku hadasho Soedvan, waaxda luqadaha, qaybta kaalmada adetyrellyamarcia, jeremias oneill . So [facility] [place] [phone].    ATENCIÓN: Si habla español, tiene a vasquez disposición servicios gratuitos de asistencia lingüística. Oniel al [phone].    We comply with applicable federal civil rights laws and [place] laws. We do not discriminate on the basis of race, color, national origin, age, disability, sex, sexual orientation, or gender identity.            Thank you!     Thank you for choosing [facility]  for your care. Our goal is always to provide you with excellent care. Hearing back from our patients is one way we can continue to improve our services. Please take a few minutes to complete the written survey that you may receive in the mail after your visit with us. Thank you!             Your Updated Medication List - Protect others around you: Learn how to safely use, store and throw away your medicines at www.disposemymeds.org.          This list is accurate as of 7/11/18  1:53 PM.  Always use your most recent med list.                   Brand Name Dispense Instructions for use Diagnosis    ACETAMINOPHEN PO      Take 1,000 mg by mouth 3 times daily " as needed        ANASTROZOLE PO      Take 1 mg by mouth daily        aspirin 81 MG EC tablet     90 tablet    Take 1 tablet (81 mg) by mouth daily    CVA (cerebral vascular accident) (H)       atorvastatin 40 MG tablet    LIPITOR     Take 40 mg by mouth every evening        bisacodyl 10 MG Suppository    DULCOLAX     Place 10 mg rectally daily as needed for constipation        carvedilol 12.5 MG tablet    COREG    60 tablet    Take 25 mg by mouth 2 times daily (with meals)    Atrial flutter, unspecified type (H), SVT (supraventricular tachycardia) (H)       cloNIDine 0.1 MG tablet    CATAPRES     Take 0.1 mg by mouth 2 times daily as needed        diltiazem 120 MG 24 hr capsule    DILACOR XR    30 capsule    Take 1 capsule (120 mg) by mouth daily    Benign essential hypertension       ferrous sulfate 325 (65 Fe) MG tablet    IRON     Take 325 mg by mouth daily (with breakfast)        hydrochlorothiazide 12.5 MG capsule    MICROZIDE    90 capsule    Take 1 capsule (12.5 mg) by mouth daily    Lower extremity edema, Benign essential hypertension       LANSOPRAZOLE PO      Take 30 mg by mouth every morning (before breakfast)        lisinopril 20 MG tablet    PRINIVIL/ZESTRIL    30 tablet    Take 1 tablet (20 mg) by mouth every evening    Benign essential hypertension       melatonin 3 MG tablet      Take 3 mg by mouth nightly as needed for sleep        polyethylene glycol powder    MIRALAX/GLYCOLAX     Take 17 g by mouth daily        ROBAFEN 100 MG/5ML Syrp   Generic drug:  guaiFENesin      Take 10 mLs by mouth every 6 hours as needed for cough        SENEXON-S 8.6-50 MG per tablet   Generic drug:  senna-docusate      Take 2 tablets by mouth 2 times daily        ZOFRAN PO      Take 4 mg by mouth every 8 hours as needed for nausea or vomiting        ZOLOFT PO      Take 50 mg by mouth daily

## 2018-10-05 ENCOUNTER — RECORDS - HEALTHEAST (OUTPATIENT)
Dept: LAB | Facility: CLINIC | Age: 83
End: 2018-10-05

## 2018-10-08 LAB
BASOPHILS # BLD AUTO: 0 THOU/UL (ref 0–0.2)
BASOPHILS NFR BLD AUTO: 1 % (ref 0–2)
EOSINOPHIL # BLD AUTO: 0.3 THOU/UL (ref 0–0.4)
EOSINOPHIL NFR BLD AUTO: 4 % (ref 0–6)
ERYTHROCYTE [DISTWIDTH] IN BLOOD BY AUTOMATED COUNT: 14.6 % (ref 11–14.5)
HCT VFR BLD AUTO: 37.9 % (ref 35–47)
HGB BLD-MCNC: 11.2 G/DL (ref 12–16)
IRON SERPL-MCNC: 59 UG/DL (ref 42–175)
LYMPHOCYTES # BLD AUTO: 1.4 THOU/UL (ref 0.8–4.4)
LYMPHOCYTES NFR BLD AUTO: 21 % (ref 20–40)
MCH RBC QN AUTO: 27.4 PG (ref 27–34)
MCHC RBC AUTO-ENTMCNC: 29.6 G/DL (ref 32–36)
MCV RBC AUTO: 93 FL (ref 80–100)
MONOCYTES # BLD AUTO: 0.9 THOU/UL (ref 0–0.9)
MONOCYTES NFR BLD AUTO: 13 % (ref 2–10)
NEUTROPHILS # BLD AUTO: 4 THOU/UL (ref 2–7.7)
NEUTROPHILS NFR BLD AUTO: 62 % (ref 50–70)
PLATELET # BLD AUTO: 261 THOU/UL (ref 140–440)
PMV BLD AUTO: 11.3 FL (ref 8.5–12.5)
RBC # BLD AUTO: 4.09 MILL/UL (ref 3.8–5.4)
WBC: 6.6 THOU/UL (ref 4–11)

## 2018-11-09 ENCOUNTER — RECORDS - HEALTHEAST (OUTPATIENT)
Dept: LAB | Facility: CLINIC | Age: 83
End: 2018-11-09

## 2018-11-12 LAB
CREAT SERPL-MCNC: 1.58 MG/DL (ref 0.6–1.1)
GFR SERPL CREATININE-BSD FRML MDRD: 31 ML/MIN/1.73M2

## 2018-12-20 ENCOUNTER — HOSPITAL ENCOUNTER (EMERGENCY)
Facility: CLINIC | Age: 83
Discharge: HOME | End: 2018-12-20
Attending: EMERGENCY MEDICINE | Admitting: EMERGENCY MEDICINE
Payer: MEDICARE

## 2018-12-20 ENCOUNTER — RECORDS - HEALTHEAST (OUTPATIENT)
Dept: LAB | Facility: CLINIC | Age: 83
End: 2018-12-20

## 2018-12-20 VITALS
TEMPERATURE: 98 F | DIASTOLIC BLOOD PRESSURE: 66 MMHG | RESPIRATION RATE: 18 BRPM | SYSTOLIC BLOOD PRESSURE: 141 MMHG | OXYGEN SATURATION: 99 % | HEART RATE: 56 BPM

## 2018-12-20 DIAGNOSIS — N81.4 UTERINE PROLAPSE: ICD-10-CM

## 2018-12-20 LAB
ANION GAP SERPL CALCULATED.3IONS-SCNC: 6 MMOL/L (ref 5–18)
BASOPHILS # BLD AUTO: 0 THOU/UL (ref 0–0.2)
BASOPHILS NFR BLD AUTO: 0 % (ref 0–2)
BUN SERPL-MCNC: 31 MG/DL (ref 8–28)
CALCIUM SERPL-MCNC: 8.9 MG/DL (ref 8.5–10.5)
CHLORIDE BLD-SCNC: 109 MMOL/L (ref 98–107)
CO2 SERPL-SCNC: 26 MMOL/L (ref 22–31)
CREAT SERPL-MCNC: 2.08 MG/DL (ref 0.6–1.1)
EOSINOPHIL # BLD AUTO: 0.2 THOU/UL (ref 0–0.4)
EOSINOPHIL NFR BLD AUTO: 3 % (ref 0–6)
ERYTHROCYTE [DISTWIDTH] IN BLOOD BY AUTOMATED COUNT: 14 % (ref 11–14.5)
GFR SERPL CREATININE-BSD FRML MDRD: 22 ML/MIN/1.73M2
GLUCOSE BLD-MCNC: 117 MG/DL (ref 70–125)
HCT VFR BLD AUTO: 38 % (ref 35–47)
HGB BLD-MCNC: 11.4 G/DL (ref 12–16)
LYMPHOCYTES # BLD AUTO: 1.5 THOU/UL (ref 0.8–4.4)
LYMPHOCYTES NFR BLD AUTO: 20 % (ref 20–40)
MCH RBC QN AUTO: 27.4 PG (ref 27–34)
MCHC RBC AUTO-ENTMCNC: 30 G/DL (ref 32–36)
MCV RBC AUTO: 91 FL (ref 80–100)
MONOCYTES # BLD AUTO: 1.1 THOU/UL (ref 0–0.9)
MONOCYTES NFR BLD AUTO: 15 % (ref 2–10)
NEUTROPHILS # BLD AUTO: 4.5 THOU/UL (ref 2–7.7)
NEUTROPHILS NFR BLD AUTO: 62 % (ref 50–70)
PLATELET # BLD AUTO: 270 THOU/UL (ref 140–440)
PMV BLD AUTO: 11.6 FL (ref 8.5–12.5)
POTASSIUM BLD-SCNC: 4.4 MMOL/L (ref 3.5–5)
RBC # BLD AUTO: 4.16 MILL/UL (ref 3.8–5.4)
SODIUM SERPL-SCNC: 141 MMOL/L (ref 136–145)
WBC: 7.3 THOU/UL (ref 4–11)

## 2018-12-20 PROCEDURE — 99284 EMERGENCY DEPT VISIT MOD MDM: CPT

## 2018-12-20 ASSESSMENT — ENCOUNTER SYMPTOMS
CHILLS: 0
VOMITING: 0
FEVER: 0
NAUSEA: 0

## 2018-12-20 NOTE — ED TRIAGE NOTES
"Pt presents with having rectal and vaginal pain, states that \"it feels like a ball is down there\" Has not had a regular bowel movement in 4 days, ABC's intact A&Ox.4   "

## 2018-12-20 NOTE — ED NOTES
Brief changed. Ambulated pt per MD. Pt had no return of symptoms (no pain/pressure.) Walk was steady and quick. MD notified.

## 2018-12-20 NOTE — ED PROVIDER NOTES
History     Chief Complaint:    rectal and vaginal pain      HPI   Anastasiya Mota is a 89 year old female who presents with concerns for a uterine prolapse.  The patient and her daughter who is in the room notes the patient does have a history of this and has seen gynecology in urogynecology.  Multiple treatments including pessaries have been tried but did not seem to be successful for various reasons.  The patient is not a surgical candidate.  The patient apparently had a uterine prolapse yesterday and the nurse practitioner at her care facility was able to reduce that.  This morning a similar thing happened but the patient was sent in here and no reduction was attempted.  The patient did have some nausea vomiting yesterday but has none of those symptoms today.  She otherwise has no concerns and is in no distress.    Allergies:    No Known Allergies     Medications:        ACETAMINOPHEN PO   ANASTROZOLE PO   aspirin 81 MG EC tablet   atorvastatin (LIPITOR) 40 MG tablet   bisacodyl (DULCOLAX) 10 MG Suppository   carvedilol (COREG) 12.5 MG tablet   cloNIDine (CATAPRES) 0.1 MG tablet   diltiazem (DILACOR XR) 120 MG 24 hr capsule   ferrous sulfate (IRON) 325 (65 FE) MG tablet   guaiFENesin (ROBAFEN) 100 MG/5ML SYRP   hydrochlorothiazide (MICROZIDE) 12.5 MG capsule   LANSOPRAZOLE PO   lisinopril (PRINIVIL/ZESTRIL) 20 MG tablet   melatonin 3 MG tablet   Ondansetron HCl (ZOFRAN PO)   polyethylene glycol (MIRALAX/GLYCOLAX) powder   senna-docusate (SENEXON-S) 8.6-50 MG per tablet   Sertraline HCl (ZOLOFT PO)         Past Medical History:      Past Medical History:   Diagnosis Date     Acid reflux      Atrial fibrillation (H)      Breast cancer (H)      Cancer (H)      Cerebral infarction (H)      Constipation      Elevated cholesterol      Hypertension      Nausea      Paroxysmal supraventricular tachycardia (H)      SVT (supraventricular tachycardia) (H)        Past Surgical History:    No surgical history (reviewed  with patient and daughter)    Family History:      Family History   Problem Relation Age of Onset     No Known Problems Mother      No Known Problems Father      Unknown/Adopted No family hx of        Social History:    Marital Status:   [5]  Social History     Tobacco Use     Smoking status: Never Smoker     Smokeless tobacco: Never Used   Substance Use Topics     Alcohol use: No     Alcohol/week: 0.0 oz     Drug use: No        Review of Systems   Constitutional: Negative for chills and fever.   Gastrointestinal: Negative for nausea and vomiting.   Genitourinary: Positive for pelvic pain (uterine prolapse).   All other systems reviewed and are negative.      Physical Exam   First Vitals:  BP: 141/66  Pulse: 56  Heart Rate: 56  Temp: 98  F (36.7  C)  Resp: 18  SpO2: 99 %      Physical Exam  Constitutional: Vital signs reviewed as above.  HENT:    Head: No external signs of trauma noted.   Eyes: Conjunctivae are normal. Pupils are equal, round, and reactive to light.   Cardiovascular:    Normal rate, regular rhythm and normal heart sounds.     Exam reveals no friction rub.     No murmur heard.  Pulmonary/Chest:    Effort normal and breath sounds normal.    No respiratory distress.    There are no wheezes.    There are no rales.   Gastrointestinal:    Soft.    Bowel sounds normal.    There is no distension.    There is no tenderness.    There is no rebound or guarding.   (Chaperoned):    Vulva: No external lesions, normal hair distribution, no adenopathy   Vagina: Moist, pink, no abnormal discharge, well rugated, no lesions. There did seem to be a small, soft protrusion of the uterus.       Musculoskeletal:    Normal range of motion.    Normal Tone  Neurological: Patient is alert and oriented to person, place, and time.   Skin: Skin is warm and dry. Patient is not diaphoretic  Psychiatric: The patient appears calm      Emergency Department Course   Procedure - Uterine Prolapse Reduction (verbal consent from  patient and daughter)  With a female chaperone in attendance the patient's legs were patent and placed in frog-leg position.  I was able to see the uterine prolapse.  With gentle pressure I was able to push this back in and reduce this.  The patient felt better after the procedure.  There were no complications noted.    Emergency Department Course:  The patient was brought to the ED and had the above history and physical performed.  I was able to manually reduce the patient's uterine prolapse.  Patient remained well and was discharged to her care facility in stable condition.  ED Course as of Dec 20 1332   Thu Dec 20, 2018   1331 Able to stand. No return of symptoms of prolapse. Will DC to care facility        Impression & Plan      Medical Decision Making:  This 89-year-old female patient presents the ED due to uterine prolapse.  Please see the HPI and exam for specifics.  Patient remained well and stable in the ED.  I was able to reduce the patient's uterine prolapse at this time we are able to discharge patient back to her care facility.  Anticipatory guidance given to patient and daughter prior to discharge.    Diagnosis:    ICD-10-CM    1. Uterine prolapse N81.4        Disposition:  discharged to care facility    Discharge Medications:     Medication List      There are no discharge medications for this visit.           Zeke Chavarria  12/20/2018   [facility] EMERGENCY DEPARTMENT       Zeke Chavarria, DO  12/20/18 1322       Zeke Chavarria, DO  12/20/18 1332

## 2018-12-20 NOTE — ED NOTES
Bed: ED25  Expected date: 12/20/18  Expected time: 12:13 PM  Means of arrival: Ambulance  Comments:  NEO-

## 2018-12-20 NOTE — ED AVS SNAPSHOT
[facility] Emergency Department  [address]  [facility] [address]  Phone:  [phone]  Fax:  [phone]                                    Anastasiya Mota   MRN: [id number]    Department:  [facility] Emergency Department   Date of Visit:  12/20/2018           After Visit Summary Signature Page    I have received my discharge instructions, and my questions have been answered. I have discussed any challenges I see with this plan with the nurse or doctor.    ..........................................................................................................................................  Patient/Patient Representative Signature      ..........................................................................................................................................  Patient Representative Print Name and Relationship to Patient    ..................................................               ................................................  Date                                   Time    ..........................................................................................................................................  Reviewed by Signature/Title    ...................................................              ..............................................  Date                                               Time          22EPIC Rev 08/18

## 2019-05-07 ENCOUNTER — RECORDS - HEALTHEAST (OUTPATIENT)
Dept: LAB | Facility: CLINIC | Age: 84
End: 2019-05-07

## 2019-05-07 LAB
ANION GAP SERPL CALCULATED.3IONS-SCNC: 9 MMOL/L (ref 5–18)
BUN SERPL-MCNC: 29 MG/DL (ref 8–28)
CALCIUM SERPL-MCNC: 9.2 MG/DL (ref 8.5–10.5)
CHLORIDE BLD-SCNC: 109 MMOL/L (ref 98–107)
CO2 SERPL-SCNC: 26 MMOL/L (ref 22–31)
CREAT SERPL-MCNC: 1.6 MG/DL (ref 0.6–1.1)
ERYTHROCYTE [DISTWIDTH] IN BLOOD BY AUTOMATED COUNT: 14.5 % (ref 11–14.5)
GFR SERPL CREATININE-BSD FRML MDRD: 30 ML/MIN/1.73M2
GLUCOSE BLD-MCNC: 93 MG/DL (ref 70–125)
HCT VFR BLD AUTO: 41.8 % (ref 35–47)
HGB BLD-MCNC: 12.4 G/DL (ref 12–16)
MCH RBC QN AUTO: 27.9 PG (ref 27–34)
MCHC RBC AUTO-ENTMCNC: 29.7 G/DL (ref 32–36)
MCV RBC AUTO: 94 FL (ref 80–100)
PLATELET # BLD AUTO: 284 THOU/UL (ref 140–440)
PMV BLD AUTO: 11.7 FL (ref 8.5–12.5)
POTASSIUM BLD-SCNC: 4.2 MMOL/L (ref 3.5–5)
RBC # BLD AUTO: 4.44 MILL/UL (ref 3.8–5.4)
SODIUM SERPL-SCNC: 144 MMOL/L (ref 136–145)
WBC: 6.6 THOU/UL (ref 4–11)

## 2019-05-22 ENCOUNTER — HOSPITAL ENCOUNTER (OUTPATIENT)
Dept: CARDIOLOGY | Facility: CLINIC | Age: 84
Discharge: HOME | End: 2019-05-22
Attending: PHYSICIAN ASSISTANT | Admitting: PHYSICIAN ASSISTANT
Payer: MEDICARE

## 2019-05-22 DIAGNOSIS — R60.0 LOWER EXTREMITY EDEMA: ICD-10-CM

## 2019-05-22 DIAGNOSIS — I10 BENIGN ESSENTIAL HYPERTENSION: ICD-10-CM

## 2019-05-22 LAB
ANION GAP SERPL CALCULATED.3IONS-SCNC: 9.7 MMOL/L (ref 6–17)
BUN SERPL-MCNC: 24 MG/DL (ref 7–30)
CALCIUM SERPL-MCNC: 8.9 MG/DL (ref 8.5–10.5)
CHLORIDE SERPL-SCNC: 112 MMOL/L (ref 98–107)
CO2 SERPL-SCNC: 25 MMOL/L (ref 23–29)
CREAT SERPL-MCNC: 1.65 MG/DL (ref 0.7–1.3)
GFR SERPL CREATININE-BSD FRML MDRD: 29 ML/MIN/{1.73_M2}
GLUCOSE SERPL-MCNC: 103 MG/DL (ref 70–105)
POTASSIUM SERPL-SCNC: 3.7 MMOL/L (ref 3.5–5.1)
SODIUM SERPL-SCNC: 143 MMOL/L (ref 136–145)

## 2019-05-22 PROCEDURE — 93306 TTE W/DOPPLER COMPLETE: CPT | Mod: 26 | Performed by: INTERNAL MEDICINE

## 2019-05-22 PROCEDURE — 36415 COLL VENOUS BLD VENIPUNCTURE: CPT | Performed by: INTERNAL MEDICINE

## 2019-05-22 PROCEDURE — 80048 BASIC METABOLIC PNL TOTAL CA: CPT | Performed by: INTERNAL MEDICINE

## 2019-05-22 PROCEDURE — 93306 TTE W/DOPPLER COMPLETE: CPT

## 2019-05-24 ENCOUNTER — TELEPHONE (OUTPATIENT)
Dept: CARDIOLOGY | Facility: CLINIC | Age: 84
End: 2019-05-24

## 2019-05-24 NOTE — TELEPHONE ENCOUNTER
----- Message from Linda Holbrook PA-C sent at 5/23/2019  4:35 PM CDT -----  Results reviewed.   Bibiana is up slightly from 6/2018 labs.   She is on hydrochlorothiazide and lisinopril.   She can continue these until she sees Dr. Barajas 8/8/19.     The echo was reviewed as well.  You can let her know nothing is overly concerning, but that Dr. Barajas will review at 8/8/19 appt.     I am not certain why the labs and echo were done so much before the appt...  Linda Holbrook PA-C 5/23/2019 4:34 PM    Called pt, no answer, left vm to call me back to review results.   ADDENDUM - pt did not call back. Sent results letter.   Chely TSANG

## 2019-05-24 NOTE — LETTER
May 28, 2019       TO: Mg Lockett   [address]  [place] [address]         Dear Linda Mcarthur PA-C reviewed your labs and echocardiogram results, nothing was overly concerning. Continue your current medical regimen and follow-up as planned 2019 with Dr. Jenn chambers will review the results in detail.      Results for orders placed or performed during the hospital encounter of 19   Echocardiogram Complete    Narrative    [id number]  [id number]  [id number]  [id number]^TORO^LINDA^E        [facility]  [facility]  Echocardiography Laboratory  [address]  [place] [address]  Phone [phone]  Fax [phone]        Name: MG LOCKETT  MRN: [id number]  : 1929  Study Date: 2019 01:58 PM  Age: 89 yrs  Gender: Female  Patient Location: [facility]  Reason For Study: Lower extremity edema, HTN  Ordering Physician: LINDA ENGEL  Referring Physician: [facility], [facility]  Performed By: Jyoti Gamble RDCS     BSA: 2.1 m2  Height: 64 in  Weight: 226 lb  HR: 61  BP: 129/59 mmHg  _____________________________________________________________________________  __     Procedure  Complete Echo Adult.     _____________________________________________________________________________  __        Interpretation Summary     The visual ejection fraction is estimated at 60-65%.  Moderate pulmonary hypertension is present. Although the TR envelope was not  the greatest.  There is mild concentric left ventricular hypertrophy.  Very small pericardial effusion  _____________________________________________________________________________  __        Left Ventricle  The left ventricle is normal in size. There is mild concentric left  ventricular hypertrophy. Left ventricular systolic function is normal. The  visual ejection fraction is estimated at 60-65%. Diastolic Doppler findings  (E/E' ratio and/or other  parameters) suggest left ventricular filling  pressures are indeterminate.     Right Ventricle  The right ventricle is normal in size and function.     Atria  Normal left atrial size. Right atrial size is normal.     Mitral Valve  There is physiologic mitral regurgitation.     Tricuspid Valve  There is trace to mild tricuspid regurgitation. The right ventricular systolic  pressure is approximated at 44.2 mmHg plus the right atrial pressure. Moderate  (46-55mmHg) pulmonary hypertension is present.        Aortic Valve  The aortic valve is not well visualized. The aortic valve is trileaflet with  aortic valve sclerosis. No aortic regurgitation is present. No hemodynamically  significant valvular aortic stenosis.     Pulmonic Valve  The pulmonic valve is not well visualized.     Vessels  The aortic root is normal size. IVC diameter <2.1 cm collapsing >50% with  sniff suggests a normal RA pressure of 3 mmHg.     Pericardium  Small pericardial effusion.     _____________________________________________________________________________  __  MMode/2D Measurements & Calculations  IVSd: 1.2 cm  LVIDd: 4.2 cm  LVIDs: 2.6 cm  LVPWd: 1.5 cm  FS: 39.3 %  LV mass(C)d: 214.3 grams  LV mass(C)dI: 104.0 grams/m2  Ao root diam: 3.0 cm  LA dimension: 3.3 cm  asc Aorta Diam: 3.5 cm  LA/Ao: 1.1  LVOT diam: 2.1 cm  LVOT area: 3.4 cm2  LA Volume (BP): 58.0 ml     LA Volume Index (BP): 28.2 ml/m2  RWT: 0.71        Doppler Measurements & Calculations  MV E max yesi: 66.7 cm/sec  MV A max yesi: 95.3 cm/sec  MV E/A: 0.70  MV dec time: 0.35 sec  PA acc time: 0.14 sec  TR max yesi: 332.3 cm/sec  TR max P.2 mmHg  E/E' av.9  Lateral E/e': 12.0     Medial E/e': 15.7           _____________________________________________________________________________  __           Report approved by: Feliberto Paul 2019 03:00 PM        Andres,    [facility]

## 2019-08-08 ENCOUNTER — OFFICE VISIT (OUTPATIENT)
Dept: CARDIOLOGY | Facility: CLINIC | Age: 84
End: 2019-08-08
Payer: MEDICARE

## 2019-08-08 VITALS
SYSTOLIC BLOOD PRESSURE: 110 MMHG | HEART RATE: 64 BPM | WEIGHT: 209.7 LBS | DIASTOLIC BLOOD PRESSURE: 50 MMHG | HEIGHT: 64 IN | BODY MASS INDEX: 35.8 KG/M2

## 2019-08-08 DIAGNOSIS — R60.0 LOWER EXTREMITY EDEMA: Primary | ICD-10-CM

## 2019-08-08 DIAGNOSIS — I48.92 ATRIAL FLUTTER, UNSPECIFIED TYPE (H): ICD-10-CM

## 2019-08-08 DIAGNOSIS — I10 BENIGN ESSENTIAL HYPERTENSION: ICD-10-CM

## 2019-08-08 PROCEDURE — 99213 OFFICE O/P EST LOW 20 MIN: CPT | Performed by: INTERNAL MEDICINE

## 2019-08-08 ASSESSMENT — MIFFLIN-ST. JEOR: SCORE: 1356.19

## 2019-08-08 NOTE — PROGRESS NOTES
"Cardiology Progress Note          Assessment and Plan:     1. Atrial flutter, paroxysmal    Continue dual AV matt blockade, no anticoagulation secondary to bleed risk      2. Hypertension, good control    Continue current medical regimen    If patient develops more dyspnea on exertion, would consider decreasing her carvedilol.      3. Edema, resolved    Decreased diltiazem dose helped her edema.      Routine follow-up in 1 year, sooner if symptoms arise.      This note was transcribed using electronic voice recognition software and there may be typographical errors present.                Interval History:   The patient is a very pleasant 89 year old whom I have been following for hypertension and paroxysmal atrial flutter but not anticoagulated secondary to GI bleeding risk.  Her dyspnea is about the same.  She feels pretty well with the titration of her medications and has not noticed any high blood pressures at her assisted living.  She also can go down to the cafeteria from her third floor apartment without any dyspnea or changes in her clinical status.                     Review of Systems:   Review of Systems:  Skin:  Negative     Eyes:  Positive for glasses;cataracts  ENT:  Positive for hearing loss  Respiratory:  Negative    Cardiovascular:    edema;Positive for;fatigue  Gastroenterology:      Genitourinary:       Musculoskeletal:       Neurologic:       Psychiatric:       Heme/Lymph/Imm:       Endocrine:                   Physical Exam:     Vitals: /50 (BP Location: Right arm, Patient Position: Sitting, Cuff Size: Adult Large)   Pulse 64   Ht 1.626 m (5' 4\")   Wt 95.1 kg (209 lb 11.2 oz)   Breastfeeding? No   BMI 35.99 kg/m    Constitutional:  cooperative, alert and oriented, well developed, well nourished, in no acute distress        Skin:  warm and dry to the touch, no apparent skin lesions or masses noted        Head:  normocephalic, no masses or lesions        Eyes:  pupils equal and " round;conjunctivae and lids unremarkable        ENT:  no pallor or cyanosis        Neck:  JVP normal        Chest:  normal breath sounds, clear to auscultation, normal A-P diameter, normal symmetry, normal respiratory excursion, no use of accessory muscles        Cardiac: regular rhythm;no murmurs, gallops or rubs detected bradycardic                Abdomen:    obese      Extremities and Back:  no deformities, clubbing, cyanosis, erythema observed   walks with walker     Neurological:  no gross motor deficits;affect appropriate                 Medications:     Current Outpatient Medications   Medication Sig Dispense Refill     ACETAMINOPHEN PO Take 1,000 mg by mouth 3 times daily as needed        ANASTROZOLE PO Take 1 mg by mouth daily        aspirin 81 MG EC tablet Take 1 tablet (81 mg) by mouth daily 90 tablet 3     atorvastatin (LIPITOR) 40 MG tablet Take 40 mg by mouth every evening        bisacodyl (DULCOLAX) 10 MG Suppository Place 10 mg rectally daily as needed for constipation       carvedilol (COREG) 12.5 MG tablet Take 25 mg by mouth 2 times daily (with meals)  60 tablet 3     cloNIDine (CATAPRES) 0.1 MG tablet Take 0.1 mg by mouth 2 times daily as needed       diltiazem (DILACOR XR) 120 MG 24 hr capsule Take 1 capsule (120 mg) by mouth daily 30 capsule 11     ferrous sulfate (IRON) 325 (65 FE) MG tablet Take 325 mg by mouth daily (with breakfast)        guaiFENesin (ROBAFEN) 100 MG/5ML SYRP Take 10 mLs by mouth every 6 hours as needed for cough       hydrochlorothiazide (MICROZIDE) 12.5 MG capsule Take 1 capsule (12.5 mg) by mouth daily 90 capsule 3     LANSOPRAZOLE PO Take 30 mg by mouth every morning (before breakfast)        lisinopril (PRINIVIL/ZESTRIL) 20 MG tablet Take 1 tablet (20 mg) by mouth every evening 30 tablet 3     melatonin 3 MG tablet Take 3 mg by mouth nightly as needed for sleep       Ondansetron HCl (ZOFRAN PO) Take 4 mg by mouth every 8 hours as needed for nausea or vomiting        polyethylene glycol (MIRALAX/GLYCOLAX) powder Take 17 g by mouth daily       senna-docusate (SENEXON-S) 8.6-50 MG per tablet Take 2 tablets by mouth 2 times daily       Sertraline HCl (ZOLOFT PO) Take 50 mg by mouth daily                  Data:   All laboratory data reviewed  No results found for this or any previous visit (from the past 24 hour(s)).    All laboratory data reviewed  No results found for: CHOL  No results found for: HDL  No results found for: LDL  No results found for: TRIG  No results found for: CHOLHDLRATIO  TSH   Date Value Ref Range Status   12/27/2016 2.05 0.40 - 4.00 mU/L Final     Last Basic Metabolic Panel:  Lab Results   Component Value Date     05/22/2019      Lab Results   Component Value Date    POTASSIUM 3.7 05/22/2019     Lab Results   Component Value Date    CHLORIDE 112 05/22/2019     Lab Results   Component Value Date    RONALD 8.9 05/22/2019     Lab Results   Component Value Date    CO2 25 05/22/2019     Lab Results   Component Value Date    BUN 24 05/22/2019     Lab Results   Component Value Date    CR 1.65 05/22/2019     Lab Results   Component Value Date     05/22/2019     Lab Results   Component Value Date    WBC 12.8 06/13/2018     Lab Results   Component Value Date    RBC 4.50 06/13/2018     Lab Results   Component Value Date    HGB 12.4 06/13/2018     Lab Results   Component Value Date    HCT 40.8 06/13/2018     Lab Results   Component Value Date    MCV 91 06/13/2018     Lab Results   Component Value Date    MCH 27.6 06/13/2018     Lab Results   Component Value Date    MCHC 30.4 06/13/2018     Lab Results   Component Value Date    RDW 13.5 06/13/2018     Lab Results   Component Value Date     06/13/2018

## 2019-08-08 NOTE — LETTER
"8/8/2019    [facility] [address]  [address] [address] [place] [address]    RE: Anastasiya Mota       Dear Colleague,    I had the pleasure of seeing Anastasiya Mota in the [facility].    Cardiology Progress Note          Assessment and Plan:     1. Atrial flutter, paroxysmal    Continue dual AV matt blockade, no anticoagulation secondary to bleed risk      2. Hypertension, good control    Continue current medical regimen    If patient develops more dyspnea on exertion, would consider decreasing her carvedilol.      3. Edema, resolved    Decreased diltiazem dose helped her edema.      Routine follow-up in 1 year, sooner if symptoms arise.      This note was transcribed using electronic voice recognition software and there may be typographical errors present.                Interval History:   The patient is a very pleasant 89 year old whom I have been following for hypertension and paroxysmal atrial flutter but not anticoagulated secondary to GI bleeding risk.  Her dyspnea is about the same.  She feels pretty well with the titration of her medications and has not noticed any high blood pressures at her assisted living.  She also can go down to the cafeteria from her third floor apartment without any dyspnea or changes in her clinical status.                     Review of Systems:   Review of Systems:  Skin:  Negative     Eyes:  Positive for glasses;cataracts  ENT:  Positive for hearing loss  Respiratory:  Negative    Cardiovascular:    edema;Positive for;fatigue  Gastroenterology:      Genitourinary:       Musculoskeletal:       Neurologic:       Psychiatric:       Heme/Lymph/Imm:       Endocrine:                   Physical Exam:     Vitals: /50 (BP Location: Right arm, Patient Position: Sitting, Cuff Size: Adult Large)   Pulse 64   Ht 1.626 m (5' 4\")   Wt 95.1 kg (209 lb 11.2 oz)   Breastfeeding? No   BMI 35.99 kg/m     Constitutional:  cooperative, " alert and oriented, well developed, well nourished, in no acute distress        Skin:  warm and dry to the touch, no apparent skin lesions or masses noted        Head:  normocephalic, no masses or lesions        Eyes:  pupils equal and round;conjunctivae and lids unremarkable        ENT:  no pallor or cyanosis        Neck:  JVP normal        Chest:  normal breath sounds, clear to auscultation, normal A-P diameter, normal symmetry, normal respiratory excursion, no use of accessory muscles        Cardiac: regular rhythm;no murmurs, gallops or rubs detected bradycardic                Abdomen:    obese      Extremities and Back:  no deformities, clubbing, cyanosis, erythema observed   walks with walker     Neurological:  no gross motor deficits;affect appropriate                 Medications:     Current Outpatient Medications   Medication Sig Dispense Refill     ACETAMINOPHEN PO Take 1,000 mg by mouth 3 times daily as needed        ANASTROZOLE PO Take 1 mg by mouth daily        aspirin 81 MG EC tablet Take 1 tablet (81 mg) by mouth daily 90 tablet 3     atorvastatin (LIPITOR) 40 MG tablet Take 40 mg by mouth every evening        bisacodyl (DULCOLAX) 10 MG Suppository Place 10 mg rectally daily as needed for constipation       carvedilol (COREG) 12.5 MG tablet Take 25 mg by mouth 2 times daily (with meals)  60 tablet 3     cloNIDine (CATAPRES) 0.1 MG tablet Take 0.1 mg by mouth 2 times daily as needed       diltiazem (DILACOR XR) 120 MG 24 hr capsule Take 1 capsule (120 mg) by mouth daily 30 capsule 11     ferrous sulfate (IRON) 325 (65 FE) MG tablet Take 325 mg by mouth daily (with breakfast)        guaiFENesin (ROBAFEN) 100 MG/5ML SYRP Take 10 mLs by mouth every 6 hours as needed for cough       hydrochlorothiazide (MICROZIDE) 12.5 MG capsule Take 1 capsule (12.5 mg) by mouth daily 90 capsule 3     LANSOPRAZOLE PO Take 30 mg by mouth every morning (before breakfast)        lisinopril (PRINIVIL/ZESTRIL) 20 MG tablet  Take 1 tablet (20 mg) by mouth every evening 30 tablet 3     melatonin 3 MG tablet Take 3 mg by mouth nightly as needed for sleep       Ondansetron HCl (ZOFRAN PO) Take 4 mg by mouth every 8 hours as needed for nausea or vomiting       polyethylene glycol (MIRALAX/GLYCOLAX) powder Take 17 g by mouth daily       senna-docusate (SENEXON-S) 8.6-50 MG per tablet Take 2 tablets by mouth 2 times daily       Sertraline HCl (ZOLOFT PO) Take 50 mg by mouth daily                  Data:   All laboratory data reviewed  No results found for this or any previous visit (from the past 24 hour(s)).    All laboratory data reviewed  No results found for: CHOL  No results found for: HDL  No results found for: LDL  No results found for: TRIG  No results found for: CHOLHDLRATIO  TSH   Date Value Ref Range Status   12/27/2016 2.05 0.40 - 4.00 mU/L Final     Last Basic Metabolic Panel:  Lab Results   Component Value Date     05/22/2019      Lab Results   Component Value Date    POTASSIUM 3.7 05/22/2019     Lab Results   Component Value Date    CHLORIDE 112 05/22/2019     Lab Results   Component Value Date    RONALD 8.9 05/22/2019     Lab Results   Component Value Date    CO2 25 05/22/2019     Lab Results   Component Value Date    BUN 24 05/22/2019     Lab Results   Component Value Date    CR 1.65 05/22/2019     Lab Results   Component Value Date     05/22/2019     Lab Results   Component Value Date    WBC 12.8 06/13/2018     Lab Results   Component Value Date    RBC 4.50 06/13/2018     Lab Results   Component Value Date    HGB 12.4 06/13/2018     Lab Results   Component Value Date    HCT 40.8 06/13/2018     Lab Results   Component Value Date    MCV 91 06/13/2018     Lab Results   Component Value Date    MCH 27.6 06/13/2018     Lab Results   Component Value Date    MCHC 30.4 06/13/2018     Lab Results   Component Value Date    RDW 13.5 06/13/2018     Lab Results   Component Value Date     06/13/2018             Thank you  for allowing me to participate in the care of your patient.    Sincerely,     Vinod Barajas MD     [facility]

## 2019-10-14 ENCOUNTER — RECORDS - HEALTHEAST (OUTPATIENT)
Dept: LAB | Facility: CLINIC | Age: 84
End: 2019-10-14

## 2019-10-14 LAB
ANION GAP SERPL CALCULATED.3IONS-SCNC: 10 MMOL/L (ref 5–18)
BASOPHILS # BLD AUTO: 0 THOU/UL (ref 0–0.2)
BASOPHILS NFR BLD AUTO: 1 % (ref 0–2)
BUN SERPL-MCNC: 25 MG/DL (ref 8–28)
CALCIUM SERPL-MCNC: 8.5 MG/DL (ref 8.5–10.5)
CHLORIDE BLD-SCNC: 108 MMOL/L (ref 98–107)
CO2 SERPL-SCNC: 25 MMOL/L (ref 22–31)
CREAT SERPL-MCNC: 1.78 MG/DL (ref 0.6–1.1)
EOSINOPHIL # BLD AUTO: 0.2 THOU/UL (ref 0–0.4)
EOSINOPHIL NFR BLD AUTO: 3 % (ref 0–6)
ERYTHROCYTE [DISTWIDTH] IN BLOOD BY AUTOMATED COUNT: 13.1 % (ref 11–14.5)
GFR SERPL CREATININE-BSD FRML MDRD: 27 ML/MIN/1.73M2
GLUCOSE BLD-MCNC: 93 MG/DL (ref 70–125)
HCT VFR BLD AUTO: 36.2 % (ref 35–47)
HGB BLD-MCNC: 10.9 G/DL (ref 12–16)
IRON SERPL-MCNC: 59 UG/DL (ref 42–175)
LYMPHOCYTES # BLD AUTO: 1.4 THOU/UL (ref 0.8–4.4)
LYMPHOCYTES NFR BLD AUTO: 24 % (ref 20–40)
MCH RBC QN AUTO: 27.9 PG (ref 27–34)
MCHC RBC AUTO-ENTMCNC: 30.1 G/DL (ref 32–36)
MCV RBC AUTO: 93 FL (ref 80–100)
MONOCYTES # BLD AUTO: 0.7 THOU/UL (ref 0–0.9)
MONOCYTES NFR BLD AUTO: 11 % (ref 2–10)
NEUTROPHILS # BLD AUTO: 3.7 THOU/UL (ref 2–7.7)
NEUTROPHILS NFR BLD AUTO: 61 % (ref 50–70)
PLATELET # BLD AUTO: 290 THOU/UL (ref 140–440)
PMV BLD AUTO: 11.3 FL (ref 8.5–12.5)
POTASSIUM BLD-SCNC: 4 MMOL/L (ref 3.5–5)
RBC # BLD AUTO: 3.91 MILL/UL (ref 3.8–5.4)
SODIUM SERPL-SCNC: 143 MMOL/L (ref 136–145)
WBC: 6 THOU/UL (ref 4–11)

## 2019-11-18 ENCOUNTER — RECORDS - HEALTHEAST (OUTPATIENT)
Dept: LAB | Facility: CLINIC | Age: 84
End: 2019-11-18

## 2019-11-18 LAB
ANION GAP SERPL CALCULATED.3IONS-SCNC: 8 MMOL/L (ref 5–18)
BASOPHILS # BLD AUTO: 0 THOU/UL (ref 0–0.2)
BASOPHILS NFR BLD AUTO: 1 % (ref 0–2)
BUN SERPL-MCNC: 27 MG/DL (ref 8–28)
CALCIUM SERPL-MCNC: 8.6 MG/DL (ref 8.5–10.5)
CHLORIDE BLD-SCNC: 112 MMOL/L (ref 98–107)
CO2 SERPL-SCNC: 26 MMOL/L (ref 22–31)
CREAT SERPL-MCNC: 1.5 MG/DL (ref 0.6–1.1)
EOSINOPHIL # BLD AUTO: 0.3 THOU/UL (ref 0–0.4)
EOSINOPHIL NFR BLD AUTO: 5 % (ref 0–6)
ERYTHROCYTE [DISTWIDTH] IN BLOOD BY AUTOMATED COUNT: 13.3 % (ref 11–14.5)
GFR SERPL CREATININE-BSD FRML MDRD: 33 ML/MIN/1.73M2
GLUCOSE BLD-MCNC: 76 MG/DL (ref 70–125)
HCT VFR BLD AUTO: 37.8 % (ref 35–47)
HGB BLD-MCNC: 11.4 G/DL (ref 12–16)
IRON SERPL-MCNC: 54 UG/DL (ref 42–175)
LYMPHOCYTES # BLD AUTO: 1.4 THOU/UL (ref 0.8–4.4)
LYMPHOCYTES NFR BLD AUTO: 27 % (ref 20–40)
MCH RBC QN AUTO: 27.9 PG (ref 27–34)
MCHC RBC AUTO-ENTMCNC: 30.2 G/DL (ref 32–36)
MCV RBC AUTO: 93 FL (ref 80–100)
MONOCYTES # BLD AUTO: 0.6 THOU/UL (ref 0–0.9)
MONOCYTES NFR BLD AUTO: 13 % (ref 2–10)
NEUTROPHILS # BLD AUTO: 2.8 THOU/UL (ref 2–7.7)
NEUTROPHILS NFR BLD AUTO: 55 % (ref 50–70)
PLATELET # BLD AUTO: 243 THOU/UL (ref 140–440)
PMV BLD AUTO: 12.2 FL (ref 8.5–12.5)
POTASSIUM BLD-SCNC: 4.3 MMOL/L (ref 3.5–5)
RBC # BLD AUTO: 4.08 MILL/UL (ref 3.8–5.4)
SODIUM SERPL-SCNC: 146 MMOL/L (ref 136–145)
WBC: 5.1 THOU/UL (ref 4–11)

## 2020-09-19 ENCOUNTER — RECORDS - HEALTHEAST (OUTPATIENT)
Dept: LAB | Facility: CLINIC | Age: 85
End: 2020-09-19

## 2020-09-21 LAB
ERYTHROCYTE [DISTWIDTH] IN BLOOD BY AUTOMATED COUNT: 14.3 % (ref 11–14.5)
HCT VFR BLD AUTO: 34.4 % (ref 35–47)
HGB BLD-MCNC: 10.3 G/DL (ref 12–16)
MCH RBC QN AUTO: 27.5 PG (ref 27–34)
MCHC RBC AUTO-ENTMCNC: 29.9 G/DL (ref 32–36)
MCV RBC AUTO: 92 FL (ref 80–100)
PLATELET # BLD AUTO: 231 THOU/UL (ref 140–440)
PMV BLD AUTO: 11.5 FL (ref 8.5–12.5)
RBC # BLD AUTO: 3.74 MILL/UL (ref 3.8–5.4)
WBC: 5.5 THOU/UL (ref 4–11)

## 2020-09-25 LAB
ANION GAP SERPL CALCULATED.3IONS-SCNC: 5 MMOL/L (ref 5–18)
BUN SERPL-MCNC: 25 MG/DL (ref 8–28)
CALCIUM SERPL-MCNC: 8.2 MG/DL (ref 8.5–10.5)
CHLORIDE BLD-SCNC: 109 MMOL/L (ref 98–107)
CO2 SERPL-SCNC: 27 MMOL/L (ref 22–31)
CREAT SERPL-MCNC: 1.31 MG/DL (ref 0.6–1.1)
GFR SERPL CREATININE-BSD FRML MDRD: 38 ML/MIN/1.73M2
GLUCOSE BLD-MCNC: 85 MG/DL (ref 70–125)
POTASSIUM BLD-SCNC: 4 MMOL/L (ref 3.5–5)
SODIUM SERPL-SCNC: 141 MMOL/L (ref 136–145)

## 2021-07-17 ENCOUNTER — LAB REQUISITION (OUTPATIENT)
Dept: LAB | Facility: CLINIC | Age: 86
End: 2021-07-17
Payer: MEDICARE

## 2021-07-17 DIAGNOSIS — F01.50 VASCULAR DEMENTIA WITHOUT BEHAVIORAL DISTURBANCE (H): ICD-10-CM

## 2021-07-17 DIAGNOSIS — Z51.81 ENCOUNTER FOR THERAPEUTIC DRUG LEVEL MONITORING: ICD-10-CM

## 2021-07-17 DIAGNOSIS — I10 ESSENTIAL (PRIMARY) HYPERTENSION: ICD-10-CM

## 2021-07-19 LAB
ANION GAP SERPL CALCULATED.3IONS-SCNC: 10 MMOL/L (ref 5–18)
BASOPHILS # BLD AUTO: 0.1 10E3/UL (ref 0–0.2)
BASOPHILS NFR BLD AUTO: 1 %
BUN SERPL-MCNC: 24 MG/DL (ref 8–28)
CALCIUM SERPL-MCNC: 8.8 MG/DL (ref 8.5–10.5)
CHLORIDE BLD-SCNC: 105 MMOL/L (ref 98–107)
CO2 SERPL-SCNC: 28 MMOL/L (ref 22–31)
CREAT SERPL-MCNC: 1.8 MG/DL (ref 0.6–1.1)
EOSINOPHIL # BLD AUTO: 0.3 10E3/UL (ref 0–0.7)
EOSINOPHIL NFR BLD AUTO: 3 %
ERYTHROCYTE [DISTWIDTH] IN BLOOD BY AUTOMATED COUNT: 13.8 % (ref 10–15)
FOLATE SERPL-MCNC: 4.4 NG/ML
GFR SERPL CREATININE-BSD FRML MDRD: 24 ML/MIN/1.73M2
GLUCOSE BLD-MCNC: 94 MG/DL (ref 70–125)
HCT VFR BLD AUTO: 41.2 % (ref 35–47)
HGB BLD-MCNC: 12.6 G/DL (ref 11.7–15.7)
IMM GRANULOCYTES # BLD: 0.1 10E3/UL
IMM GRANULOCYTES NFR BLD: 1 %
LYMPHOCYTES # BLD AUTO: 1.7 10E3/UL (ref 0.8–5.3)
LYMPHOCYTES NFR BLD AUTO: 20 %
MCH RBC QN AUTO: 27.2 PG (ref 26.5–33)
MCHC RBC AUTO-ENTMCNC: 30.6 G/DL (ref 31.5–36.5)
MCV RBC AUTO: 89 FL (ref 78–100)
MONOCYTES # BLD AUTO: 0.9 10E3/UL (ref 0–1.3)
MONOCYTES NFR BLD AUTO: 10 %
NEUTROPHILS # BLD AUTO: 5.9 10E3/UL (ref 1.6–8.3)
NEUTROPHILS NFR BLD AUTO: 65 %
NRBC # BLD AUTO: 0 10E3/UL
NRBC BLD AUTO-RTO: 0 /100
PLATELET # BLD AUTO: 362 10E3/UL (ref 150–450)
POTASSIUM BLD-SCNC: 4 MMOL/L (ref 3.5–5)
RBC # BLD AUTO: 4.64 10E6/UL (ref 3.8–5.2)
SODIUM SERPL-SCNC: 143 MMOL/L (ref 136–145)
TSH SERPL DL<=0.005 MIU/L-ACNC: 1.46 UIU/ML (ref 0.3–5)
VIT B12 SERPL-MCNC: 1856 PG/ML (ref 213–816)
WBC # BLD AUTO: 8.8 10E3/UL (ref 4–11)

## 2021-07-19 PROCEDURE — 82306 VITAMIN D 25 HYDROXY: CPT | Mod: ORL | Performed by: NURSE PRACTITIONER

## 2021-07-19 PROCEDURE — 80048 BASIC METABOLIC PNL TOTAL CA: CPT | Mod: ORL | Performed by: NURSE PRACTITIONER

## 2021-07-19 PROCEDURE — 85025 COMPLETE CBC W/AUTO DIFF WBC: CPT | Mod: ORL | Performed by: NURSE PRACTITIONER

## 2021-07-19 PROCEDURE — 36415 COLL VENOUS BLD VENIPUNCTURE: CPT | Mod: ORL | Performed by: NURSE PRACTITIONER

## 2021-07-19 PROCEDURE — 82746 ASSAY OF FOLIC ACID SERUM: CPT | Mod: ORL | Performed by: NURSE PRACTITIONER

## 2021-07-19 PROCEDURE — P9604 ONE-WAY ALLOW PRORATED TRIP: HCPCS | Mod: ORL | Performed by: NURSE PRACTITIONER

## 2021-07-19 PROCEDURE — 84443 ASSAY THYROID STIM HORMONE: CPT | Mod: ORL | Performed by: NURSE PRACTITIONER

## 2021-07-19 PROCEDURE — 82607 VITAMIN B-12: CPT | Mod: ORL | Performed by: NURSE PRACTITIONER

## 2021-07-20 LAB — DEPRECATED CALCIDIOL+CALCIFEROL SERPL-MC: 6 UG/L (ref 30–80)

## 2021-07-22 ENCOUNTER — LAB REQUISITION (OUTPATIENT)
Dept: LAB | Facility: CLINIC | Age: 86
End: 2021-07-22
Payer: MEDICARE

## 2021-07-22 DIAGNOSIS — Z51.81 ENCOUNTER FOR THERAPEUTIC DRUG LEVEL MONITORING: ICD-10-CM

## 2021-07-26 PROCEDURE — P9603 ONE-WAY ALLOW PRORATED MILES: HCPCS | Mod: ORL | Performed by: NURSE PRACTITIONER

## 2021-07-26 PROCEDURE — 80048 BASIC METABOLIC PNL TOTAL CA: CPT | Mod: ORL | Performed by: NURSE PRACTITIONER

## 2021-07-26 PROCEDURE — 36415 COLL VENOUS BLD VENIPUNCTURE: CPT | Mod: ORL | Performed by: NURSE PRACTITIONER

## 2021-07-27 LAB
ANION GAP SERPL CALCULATED.3IONS-SCNC: 12 MMOL/L (ref 5–18)
BUN SERPL-MCNC: 36 MG/DL (ref 8–28)
CALCIUM SERPL-MCNC: 8.8 MG/DL (ref 8.5–10.5)
CHLORIDE BLD-SCNC: 108 MMOL/L (ref 98–107)
CO2 SERPL-SCNC: 24 MMOL/L (ref 22–31)
CREAT SERPL-MCNC: 1.57 MG/DL (ref 0.6–1.1)
GFR SERPL CREATININE-BSD FRML MDRD: 28 ML/MIN/1.73M2
GLUCOSE BLD-MCNC: 69 MG/DL (ref 70–125)
POTASSIUM BLD-SCNC: 4.1 MMOL/L (ref 3.5–5)
SODIUM SERPL-SCNC: 144 MMOL/L (ref 136–145)

## 2021-08-30 ENCOUNTER — LAB REQUISITION (OUTPATIENT)
Dept: LAB | Facility: CLINIC | Age: 86
End: 2021-08-30
Payer: MEDICARE

## 2021-08-30 DIAGNOSIS — E55.9 VITAMIN D DEFICIENCY, UNSPECIFIED: ICD-10-CM

## 2021-09-07 PROCEDURE — P9603 ONE-WAY ALLOW PRORATED MILES: HCPCS | Mod: ORL | Performed by: NURSE PRACTITIONER

## 2021-09-07 PROCEDURE — 36415 COLL VENOUS BLD VENIPUNCTURE: CPT | Mod: ORL | Performed by: NURSE PRACTITIONER

## 2021-09-07 PROCEDURE — 82306 VITAMIN D 25 HYDROXY: CPT | Mod: ORL | Performed by: NURSE PRACTITIONER

## 2021-09-08 LAB — DEPRECATED CALCIDIOL+CALCIFEROL SERPL-MC: 24 UG/L (ref 30–80)

## 2021-10-01 ENCOUNTER — LAB REQUISITION (OUTPATIENT)
Dept: LAB | Facility: CLINIC | Age: 86
End: 2021-10-01
Payer: MEDICARE

## 2021-10-04 LAB
ANION GAP SERPL CALCULATED.3IONS-SCNC: 7 MMOL/L (ref 5–18)
BUN SERPL-MCNC: 24 MG/DL (ref 8–28)
CALCIUM SERPL-MCNC: 9.2 MG/DL (ref 8.5–10.5)
CHLORIDE BLD-SCNC: 109 MMOL/L (ref 98–107)
CO2 SERPL-SCNC: 27 MMOL/L (ref 22–31)
CREAT SERPL-MCNC: 1.39 MG/DL (ref 0.6–1.1)
GFR SERPL CREATININE-BSD FRML MDRD: 33 ML/MIN/1.73M2
GLUCOSE BLD-MCNC: 92 MG/DL (ref 70–125)
POTASSIUM BLD-SCNC: 4.6 MMOL/L (ref 3.5–5)
SODIUM SERPL-SCNC: 143 MMOL/L (ref 136–145)

## 2021-10-04 PROCEDURE — P9604 ONE-WAY ALLOW PRORATED TRIP: HCPCS | Mod: ORL | Performed by: NURSE PRACTITIONER

## 2021-10-04 PROCEDURE — 36415 COLL VENOUS BLD VENIPUNCTURE: CPT | Mod: ORL | Performed by: NURSE PRACTITIONER

## 2021-10-04 PROCEDURE — 80048 BASIC METABOLIC PNL TOTAL CA: CPT | Mod: ORL | Performed by: NURSE PRACTITIONER

## 2021-11-19 ENCOUNTER — LAB REQUISITION (OUTPATIENT)
Dept: LAB | Facility: CLINIC | Age: 86
End: 2021-11-19
Payer: MEDICARE

## 2021-11-19 LAB
ALBUMIN UR-MCNC: 30 MG/DL
APPEARANCE UR: ABNORMAL
BACTERIA #/AREA URNS HPF: ABNORMAL /HPF
BILIRUB UR QL STRIP: NEGATIVE
COLOR UR AUTO: YELLOW
GLUCOSE UR STRIP-MCNC: NEGATIVE MG/DL
HGB UR QL STRIP: ABNORMAL
KETONES UR STRIP-MCNC: NEGATIVE MG/DL
LEUKOCYTE ESTERASE UR QL STRIP: ABNORMAL
MUCOUS THREADS #/AREA URNS LPF: PRESENT /LPF
NITRATE UR QL: POSITIVE
PH UR STRIP: 5.5 [PH] (ref 5–7)
RBC URINE: 28 /HPF
SP GR UR STRIP: 1.02 (ref 1–1.03)
SQUAMOUS EPITHELIAL: 13 /HPF
UROBILINOGEN UR STRIP-MCNC: <2 MG/DL
WBC CLUMPS #/AREA URNS HPF: PRESENT /HPF
WBC URINE: >182 /HPF

## 2021-11-19 PROCEDURE — 81001 URINALYSIS AUTO W/SCOPE: CPT | Mod: ORL | Performed by: NURSE PRACTITIONER

## 2021-11-19 PROCEDURE — 87086 URINE CULTURE/COLONY COUNT: CPT | Mod: ORL | Performed by: NURSE PRACTITIONER

## 2021-11-21 LAB — BACTERIA UR CULT: ABNORMAL

## 2022-01-01 ENCOUNTER — APPOINTMENT (OUTPATIENT)
Dept: CARDIOLOGY | Facility: CLINIC | Age: 87
End: 2022-01-01
Attending: INTERNAL MEDICINE
Payer: MEDICARE

## 2022-01-01 ENCOUNTER — LAB REQUISITION (OUTPATIENT)
Dept: LAB | Facility: CLINIC | Age: 87
End: 2022-01-01
Payer: MEDICARE

## 2022-01-01 ENCOUNTER — HOSPITAL ENCOUNTER (OUTPATIENT)
Facility: CLINIC | Age: 87
Setting detail: OBSERVATION
Discharge: NURSING, REHAB OR LONG-TERM CARE | End: 2022-10-12
Attending: EMERGENCY MEDICINE | Admitting: INTERNAL MEDICINE
Payer: MEDICARE

## 2022-01-01 ENCOUNTER — APPOINTMENT (OUTPATIENT)
Dept: GENERAL RADIOLOGY | Facility: CLINIC | Age: 87
End: 2022-01-01
Attending: EMERGENCY MEDICINE
Payer: MEDICARE

## 2022-01-01 ENCOUNTER — HOSPITAL ENCOUNTER (EMERGENCY)
Facility: CLINIC | Age: 87
Discharge: HOME | End: 2022-05-19
Attending: EMERGENCY MEDICINE | Admitting: EMERGENCY MEDICINE
Payer: MEDICARE

## 2022-01-01 ENCOUNTER — APPOINTMENT (OUTPATIENT)
Dept: CT IMAGING | Facility: CLINIC | Age: 87
End: 2022-01-01
Attending: EMERGENCY MEDICINE
Payer: MEDICARE

## 2022-01-01 VITALS
RESPIRATION RATE: 14 BRPM | TEMPERATURE: 98.3 F | HEART RATE: 54 BPM | SYSTOLIC BLOOD PRESSURE: 155 MMHG | OXYGEN SATURATION: 98 % | DIASTOLIC BLOOD PRESSURE: 98 MMHG

## 2022-01-01 VITALS
RESPIRATION RATE: 18 BRPM | TEMPERATURE: 97.9 F | DIASTOLIC BLOOD PRESSURE: 77 MMHG | OXYGEN SATURATION: 95 % | BODY MASS INDEX: 32.87 KG/M2 | HEART RATE: 65 BPM | WEIGHT: 191.5 LBS | SYSTOLIC BLOOD PRESSURE: 158 MMHG

## 2022-01-01 DIAGNOSIS — F01.50 VASCULAR DEMENTIA WITHOUT BEHAVIORAL DISTURBANCE (H): ICD-10-CM

## 2022-01-01 DIAGNOSIS — R40.4 UNRESPONSIVE EPISODE: ICD-10-CM

## 2022-01-01 DIAGNOSIS — R00.1 SINUS BRADYCARDIA: ICD-10-CM

## 2022-01-01 DIAGNOSIS — I10 ESSENTIAL (PRIMARY) HYPERTENSION: ICD-10-CM

## 2022-01-01 DIAGNOSIS — R30.0 DYSURIA: ICD-10-CM

## 2022-01-01 DIAGNOSIS — R93.89 ABNORMAL FINDING ON CHEST XRAY: ICD-10-CM

## 2022-01-01 DIAGNOSIS — Z86.79 HISTORY OF ATRIAL FIBRILLATION: ICD-10-CM

## 2022-01-01 DIAGNOSIS — R55 SYNCOPE, UNSPECIFIED SYNCOPE TYPE: ICD-10-CM

## 2022-01-01 DIAGNOSIS — R11.2 NAUSEA AND VOMITING, UNSPECIFIED VOMITING TYPE: ICD-10-CM

## 2022-01-01 DIAGNOSIS — N28.9 RENAL INSUFFICIENCY: ICD-10-CM

## 2022-01-01 LAB
ALBUMIN SERPL BCG-MCNC: 3.3 G/DL (ref 3.5–5.2)
ALBUMIN SERPL-MCNC: 2.7 G/DL (ref 3.4–5)
ALBUMIN UR-MCNC: 10 MG/DL
ALP SERPL-CCNC: 73 U/L (ref 35–104)
ALP SERPL-CCNC: 97 U/L (ref 40–150)
ALT SERPL W P-5'-P-CCNC: 15 U/L (ref 0–50)
ALT SERPL W P-5'-P-CCNC: 6 U/L (ref 10–35)
ANION GAP SERPL CALCULATED.3IONS-SCNC: 4 MMOL/L (ref 3–14)
ANION GAP SERPL CALCULATED.3IONS-SCNC: 7 MMOL/L (ref 7–15)
ANION GAP SERPL CALCULATED.3IONS-SCNC: 8 MMOL/L (ref 7–15)
ANION GAP SERPL CALCULATED.3IONS-SCNC: 9 MMOL/L (ref 5–18)
ANION GAP SERPL CALCULATED.3IONS-SCNC: 9 MMOL/L (ref 7–15)
APPEARANCE UR: ABNORMAL
AST SERPL W P-5'-P-CCNC: 11 U/L (ref 0–45)
AST SERPL W P-5'-P-CCNC: 15 U/L (ref 10–35)
ATRIAL RATE - MUSE: 56 BPM
ATRIAL RATE - MUSE: 58 BPM
BACTERIA #/AREA URNS HPF: ABNORMAL /HPF
BACTERIA UR CULT: NORMAL
BASOPHILS # BLD AUTO: 0 10E3/UL (ref 0–0.2)
BASOPHILS # BLD AUTO: 0.1 10E3/UL (ref 0–0.2)
BASOPHILS NFR BLD AUTO: 0 %
BASOPHILS NFR BLD AUTO: 0 %
BASOPHILS NFR BLD AUTO: 1 %
BASOPHILS NFR BLD AUTO: 1 %
BILIRUB SERPL-MCNC: 0.3 MG/DL
BILIRUB SERPL-MCNC: 0.4 MG/DL (ref 0.2–1.3)
BILIRUB UR QL STRIP: NEGATIVE
BUN SERPL-MCNC: 22.1 MG/DL (ref 8–23)
BUN SERPL-MCNC: 25 MG/DL (ref 7–30)
BUN SERPL-MCNC: 25.6 MG/DL (ref 8–23)
BUN SERPL-MCNC: 25.8 MG/DL (ref 8–23)
BUN SERPL-MCNC: 26 MG/DL (ref 8–28)
CALCIUM SERPL-MCNC: 8.3 MG/DL (ref 8.5–10.1)
CALCIUM SERPL-MCNC: 8.4 MG/DL (ref 8.2–9.6)
CALCIUM SERPL-MCNC: 8.5 MG/DL (ref 8.5–10.5)
CALCIUM SERPL-MCNC: 8.6 MG/DL (ref 8.2–9.6)
CALCIUM SERPL-MCNC: 8.6 MG/DL (ref 8.2–9.6)
CHLORIDE BLD-SCNC: 110 MMOL/L (ref 98–107)
CHLORIDE BLD-SCNC: 111 MMOL/L (ref 94–109)
CHLORIDE SERPL-SCNC: 107 MMOL/L (ref 98–107)
CHLORIDE SERPL-SCNC: 109 MMOL/L (ref 98–107)
CHLORIDE SERPL-SCNC: 110 MMOL/L (ref 98–107)
CO2 SERPL-SCNC: 25 MMOL/L (ref 22–31)
CO2 SERPL-SCNC: 28 MMOL/L (ref 20–32)
COLOR UR AUTO: YELLOW
CREAT SERPL-MCNC: 1.33 MG/DL (ref 0.51–0.95)
CREAT SERPL-MCNC: 1.35 MG/DL (ref 0.6–1.1)
CREAT SERPL-MCNC: 1.4 MG/DL (ref 0.52–1.04)
CREAT SERPL-MCNC: 1.41 MG/DL (ref 0.51–0.95)
CREAT SERPL-MCNC: 1.56 MG/DL (ref 0.51–0.95)
DEPRECATED HCO3 PLAS-SCNC: 25 MMOL/L (ref 22–29)
DEPRECATED HCO3 PLAS-SCNC: 25 MMOL/L (ref 22–29)
DEPRECATED HCO3 PLAS-SCNC: 27 MMOL/L (ref 22–29)
DIASTOLIC BLOOD PRESSURE - MUSE: NORMAL MMHG
DIASTOLIC BLOOD PRESSURE - MUSE: NORMAL MMHG
EOSINOPHIL # BLD AUTO: 0.2 10E3/UL (ref 0–0.7)
EOSINOPHIL # BLD AUTO: 0.2 10E3/UL (ref 0–0.7)
EOSINOPHIL # BLD AUTO: 0.3 10E3/UL (ref 0–0.7)
EOSINOPHIL # BLD AUTO: 0.3 10E3/UL (ref 0–0.7)
EOSINOPHIL NFR BLD AUTO: 3 %
EOSINOPHIL NFR BLD AUTO: 3 %
EOSINOPHIL NFR BLD AUTO: 5 %
EOSINOPHIL NFR BLD AUTO: 5 %
ERYTHROCYTE [DISTWIDTH] IN BLOOD BY AUTOMATED COUNT: 14.2 % (ref 10–15)
ERYTHROCYTE [DISTWIDTH] IN BLOOD BY AUTOMATED COUNT: 14.6 % (ref 10–15)
ERYTHROCYTE [DISTWIDTH] IN BLOOD BY AUTOMATED COUNT: 14.8 % (ref 10–15)
FOLATE SERPL-MCNC: 4 NG/ML (ref 4.6–34.8)
GFR SERPL CREATININE-BSD FRML MDRD: 31 ML/MIN/1.73M2
GFR SERPL CREATININE-BSD FRML MDRD: 35 ML/MIN/1.73M2
GFR SERPL CREATININE-BSD FRML MDRD: 35 ML/MIN/1.73M2
GFR SERPL CREATININE-BSD FRML MDRD: 36 ML/MIN/1.73M2
GFR SERPL CREATININE-BSD FRML MDRD: 37 ML/MIN/1.73M2
GLUCOSE BLD-MCNC: 88 MG/DL (ref 70–125)
GLUCOSE BLD-MCNC: 93 MG/DL (ref 70–99)
GLUCOSE SERPL-MCNC: 104 MG/DL (ref 70–99)
GLUCOSE SERPL-MCNC: 112 MG/DL (ref 70–99)
GLUCOSE SERPL-MCNC: 83 MG/DL (ref 70–99)
GLUCOSE UR STRIP-MCNC: NEGATIVE MG/DL
HCT VFR BLD AUTO: 35.3 % (ref 35–47)
HCT VFR BLD AUTO: 35.9 % (ref 35–47)
HCT VFR BLD AUTO: 37.7 % (ref 35–47)
HCT VFR BLD AUTO: 38.1 % (ref 35–47)
HCT VFR BLD AUTO: 40.5 % (ref 35–47)
HGB BLD-MCNC: 10.6 G/DL (ref 11.7–15.7)
HGB BLD-MCNC: 10.8 G/DL (ref 11.7–15.7)
HGB BLD-MCNC: 11 G/DL (ref 11.7–15.7)
HGB BLD-MCNC: 11.3 G/DL (ref 11.7–15.7)
HGB BLD-MCNC: 12.1 G/DL (ref 11.7–15.7)
HGB UR QL STRIP: ABNORMAL
HOLD SPECIMEN: NORMAL
IMM GRANULOCYTES # BLD: 0 10E3/UL
IMM GRANULOCYTES NFR BLD: 0 %
IMM GRANULOCYTES NFR BLD: 0 %
IMM GRANULOCYTES NFR BLD: 1 %
IMM GRANULOCYTES NFR BLD: 1 %
INTERPRETATION ECG - MUSE: NORMAL
INTERPRETATION ECG - MUSE: NORMAL
KETONES UR STRIP-MCNC: NEGATIVE MG/DL
LEUKOCYTE ESTERASE UR QL STRIP: ABNORMAL
LVEF ECHO: NORMAL
LYMPHOCYTES # BLD AUTO: 1.3 10E3/UL (ref 0.8–5.3)
LYMPHOCYTES # BLD AUTO: 1.5 10E3/UL (ref 0.8–5.3)
LYMPHOCYTES # BLD AUTO: 1.6 10E3/UL (ref 0.8–5.3)
LYMPHOCYTES # BLD AUTO: 1.7 10E3/UL (ref 0.8–5.3)
LYMPHOCYTES NFR BLD AUTO: 20 %
LYMPHOCYTES NFR BLD AUTO: 24 %
LYMPHOCYTES NFR BLD AUTO: 25 %
LYMPHOCYTES NFR BLD AUTO: 28 %
MAGNESIUM SERPL-MCNC: 2 MG/DL (ref 1.6–2.3)
MCH RBC QN AUTO: 27 PG (ref 26.5–33)
MCH RBC QN AUTO: 27.1 PG (ref 26.5–33)
MCH RBC QN AUTO: 27.4 PG (ref 26.5–33)
MCH RBC QN AUTO: 27.4 PG (ref 26.5–33)
MCH RBC QN AUTO: 27.5 PG (ref 26.5–33)
MCHC RBC AUTO-ENTMCNC: 29.2 G/DL (ref 31.5–36.5)
MCHC RBC AUTO-ENTMCNC: 29.7 G/DL (ref 31.5–36.5)
MCHC RBC AUTO-ENTMCNC: 29.9 G/DL (ref 31.5–36.5)
MCHC RBC AUTO-ENTMCNC: 30 G/DL (ref 31.5–36.5)
MCHC RBC AUTO-ENTMCNC: 30.1 G/DL (ref 31.5–36.5)
MCV RBC AUTO: 91 FL (ref 78–100)
MCV RBC AUTO: 91 FL (ref 78–100)
MCV RBC AUTO: 92 FL (ref 78–100)
MCV RBC AUTO: 92 FL (ref 78–100)
MCV RBC AUTO: 93 FL (ref 78–100)
MONOCYTES # BLD AUTO: 0.7 10E3/UL (ref 0–1.3)
MONOCYTES # BLD AUTO: 0.8 10E3/UL (ref 0–1.3)
MONOCYTES NFR BLD AUTO: 11 %
MONOCYTES NFR BLD AUTO: 11 %
MONOCYTES NFR BLD AUTO: 12 %
MONOCYTES NFR BLD AUTO: 13 %
MUCOUS THREADS #/AREA URNS LPF: PRESENT /LPF
NEUTROPHILS # BLD AUTO: 3.2 10E3/UL (ref 1.6–8.3)
NEUTROPHILS # BLD AUTO: 3.2 10E3/UL (ref 1.6–8.3)
NEUTROPHILS # BLD AUTO: 3.9 10E3/UL (ref 1.6–8.3)
NEUTROPHILS # BLD AUTO: 4.8 10E3/UL (ref 1.6–8.3)
NEUTROPHILS NFR BLD AUTO: 56 %
NEUTROPHILS NFR BLD AUTO: 57 %
NEUTROPHILS NFR BLD AUTO: 57 %
NEUTROPHILS NFR BLD AUTO: 66 %
NITRATE UR QL: NEGATIVE
NRBC # BLD AUTO: 0 10E3/UL
NRBC BLD AUTO-RTO: 0 /100
P AXIS - MUSE: 44 DEGREES
P AXIS - MUSE: 65 DEGREES
PH UR STRIP: 6 [PH] (ref 5–7)
PLATELET # BLD AUTO: 151 10E3/UL (ref 150–450)
PLATELET # BLD AUTO: 234 10E3/UL (ref 150–450)
PLATELET # BLD AUTO: 250 10E3/UL (ref 150–450)
PLATELET # BLD AUTO: 250 10E3/UL (ref 150–450)
PLATELET # BLD AUTO: 261 10E3/UL (ref 150–450)
POTASSIUM BLD-SCNC: 3.7 MMOL/L (ref 3.5–5)
POTASSIUM BLD-SCNC: 4.5 MMOL/L (ref 3.4–5.3)
POTASSIUM SERPL-SCNC: 4.2 MMOL/L (ref 3.4–5.3)
POTASSIUM SERPL-SCNC: 4.2 MMOL/L (ref 3.4–5.3)
POTASSIUM SERPL-SCNC: 4.8 MMOL/L (ref 3.4–5.3)
PR INTERVAL - MUSE: 166 MS
PR INTERVAL - MUSE: 180 MS
PROT SERPL-MCNC: 5.8 G/DL (ref 6.4–8.3)
PROT SERPL-MCNC: 6.4 G/DL (ref 6.8–8.8)
QRS DURATION - MUSE: 144 MS
QRS DURATION - MUSE: 144 MS
QT - MUSE: 482 MS
QT - MUSE: 482 MS
QTC - MUSE: 465 MS
QTC - MUSE: 473 MS
R AXIS - MUSE: 72 DEGREES
R AXIS - MUSE: 78 DEGREES
RBC # BLD AUTO: 3.86 10E6/UL (ref 3.8–5.2)
RBC # BLD AUTO: 3.94 10E6/UL (ref 3.8–5.2)
RBC # BLD AUTO: 4.07 10E6/UL (ref 3.8–5.2)
RBC # BLD AUTO: 4.17 10E6/UL (ref 3.8–5.2)
RBC # BLD AUTO: 4.41 10E6/UL (ref 3.8–5.2)
RBC URINE: 9 /HPF
SARS-COV-2 RNA RESP QL NAA+PROBE: NEGATIVE
SODIUM SERPL-SCNC: 141 MMOL/L (ref 136–145)
SODIUM SERPL-SCNC: 143 MMOL/L (ref 133–144)
SODIUM SERPL-SCNC: 143 MMOL/L (ref 136–145)
SODIUM SERPL-SCNC: 143 MMOL/L (ref 136–145)
SODIUM SERPL-SCNC: 144 MMOL/L (ref 136–145)
SP GR UR STRIP: 1.02 (ref 1–1.03)
SQUAMOUS EPITHELIAL: 7 /HPF
SYSTOLIC BLOOD PRESSURE - MUSE: NORMAL MMHG
SYSTOLIC BLOOD PRESSURE - MUSE: NORMAL MMHG
T AXIS - MUSE: 45 DEGREES
T AXIS - MUSE: 57 DEGREES
TROPONIN I SERPL HS-MCNC: 7 NG/L
TROPONIN T SERPL HS-MCNC: 26 NG/L
TROPONIN T SERPL HS-MCNC: 26 NG/L
TROPONIN T SERPL HS-MCNC: 28 NG/L
TSH SERPL DL<=0.005 MIU/L-ACNC: 2.34 UIU/ML (ref 0.3–4.2)
UROBILINOGEN UR STRIP-MCNC: <2 MG/DL
VENTRICULAR RATE- MUSE: 56 BPM
VENTRICULAR RATE- MUSE: 58 BPM
VIT B12 SERPL-MCNC: 759 PG/ML (ref 232–1245)
WBC # BLD AUTO: 5.6 10E3/UL (ref 4–11)
WBC # BLD AUTO: 5.8 10E3/UL (ref 4–11)
WBC # BLD AUTO: 6.7 10E3/UL (ref 4–11)
WBC # BLD AUTO: 7.4 10E3/UL (ref 4–11)
WBC # BLD AUTO: 7.8 10E3/UL (ref 4–11)
WBC CLUMPS #/AREA URNS HPF: PRESENT /HPF
WBC URINE: 74 /HPF

## 2022-01-01 PROCEDURE — 36415 COLL VENOUS BLD VENIPUNCTURE: CPT | Mod: ORL | Performed by: NURSE PRACTITIONER

## 2022-01-01 PROCEDURE — 258N000003 HC RX IP 258 OP 636: Performed by: INTERNAL MEDICINE

## 2022-01-01 PROCEDURE — 87086 URINE CULTURE/COLONY COUNT: CPT | Mod: ORL | Performed by: NURSE PRACTITIONER

## 2022-01-01 PROCEDURE — 83735 ASSAY OF MAGNESIUM: CPT | Performed by: EMERGENCY MEDICINE

## 2022-01-01 PROCEDURE — G0378 HOSPITAL OBSERVATION PER HR: HCPCS

## 2022-01-01 PROCEDURE — C9803 HOPD COVID-19 SPEC COLLECT: HCPCS

## 2022-01-01 PROCEDURE — 85014 HEMATOCRIT: CPT | Performed by: INTERNAL MEDICINE

## 2022-01-01 PROCEDURE — 84443 ASSAY THYROID STIM HORMONE: CPT | Mod: ORL | Performed by: NURSE PRACTITIONER

## 2022-01-01 PROCEDURE — 85025 COMPLETE CBC W/AUTO DIFF WBC: CPT | Mod: ORL | Performed by: NURSE PRACTITIONER

## 2022-01-01 PROCEDURE — 82607 VITAMIN B-12: CPT | Mod: ORL | Performed by: NURSE PRACTITIONER

## 2022-01-01 PROCEDURE — 84484 ASSAY OF TROPONIN QUANT: CPT | Performed by: EMERGENCY MEDICINE

## 2022-01-01 PROCEDURE — 250N000013 HC RX MED GY IP 250 OP 250 PS 637: Performed by: PHYSICIAN ASSISTANT

## 2022-01-01 PROCEDURE — P9603 ONE-WAY ALLOW PRORATED MILES: HCPCS | Mod: ORL | Performed by: NURSE PRACTITIONER

## 2022-01-01 PROCEDURE — 99220 PR INITIAL OBSERVATION CARE,LEVEL III: CPT | Performed by: INTERNAL MEDICINE

## 2022-01-01 PROCEDURE — 99285 EMERGENCY DEPT VISIT HI MDM: CPT | Mod: 25

## 2022-01-01 PROCEDURE — U0005 INFEC AGEN DETEC AMPLI PROBE: HCPCS | Performed by: EMERGENCY MEDICINE

## 2022-01-01 PROCEDURE — 36415 COLL VENOUS BLD VENIPUNCTURE: CPT | Performed by: EMERGENCY MEDICINE

## 2022-01-01 PROCEDURE — 85004 AUTOMATED DIFF WBC COUNT: CPT | Performed by: EMERGENCY MEDICINE

## 2022-01-01 PROCEDURE — 80053 COMPREHEN METABOLIC PANEL: CPT | Performed by: EMERGENCY MEDICINE

## 2022-01-01 PROCEDURE — 82746 ASSAY OF FOLIC ACID SERUM: CPT | Mod: ORL | Performed by: NURSE PRACTITIONER

## 2022-01-01 PROCEDURE — 84484 ASSAY OF TROPONIN QUANT: CPT | Mod: 91 | Performed by: INTERNAL MEDICINE

## 2022-01-01 PROCEDURE — 80048 BASIC METABOLIC PNL TOTAL CA: CPT | Mod: ORL | Performed by: NURSE PRACTITIONER

## 2022-01-01 PROCEDURE — 81001 URINALYSIS AUTO W/SCOPE: CPT | Mod: ORL | Performed by: NURSE PRACTITIONER

## 2022-01-01 PROCEDURE — 96360 HYDRATION IV INFUSION INIT: CPT | Mod: 59

## 2022-01-01 PROCEDURE — 93005 ELECTROCARDIOGRAM TRACING: CPT

## 2022-01-01 PROCEDURE — 93306 TTE W/DOPPLER COMPLETE: CPT | Mod: 26 | Performed by: INTERNAL MEDICINE

## 2022-01-01 PROCEDURE — G1010 CDSM STANSON: HCPCS

## 2022-01-01 PROCEDURE — 80048 BASIC METABOLIC PNL TOTAL CA: CPT | Performed by: EMERGENCY MEDICINE

## 2022-01-01 PROCEDURE — G1004 CDSM NDSC: HCPCS

## 2022-01-01 PROCEDURE — 71046 X-RAY EXAM CHEST 2 VIEWS: CPT

## 2022-01-01 PROCEDURE — 85025 COMPLETE CBC W/AUTO DIFF WBC: CPT | Performed by: EMERGENCY MEDICINE

## 2022-01-01 PROCEDURE — 80048 BASIC METABOLIC PNL TOTAL CA: CPT | Performed by: INTERNAL MEDICINE

## 2022-01-01 PROCEDURE — 93306 TTE W/DOPPLER COMPLETE: CPT

## 2022-01-01 PROCEDURE — 80053 COMPREHEN METABOLIC PANEL: CPT | Mod: ORL | Performed by: NURSE PRACTITIONER

## 2022-01-01 PROCEDURE — 96361 HYDRATE IV INFUSION ADD-ON: CPT

## 2022-01-01 PROCEDURE — 36415 COLL VENOUS BLD VENIPUNCTURE: CPT | Performed by: INTERNAL MEDICINE

## 2022-01-01 RX ORDER — PROCHLORPERAZINE 25 MG
12.5 SUPPOSITORY, RECTAL RECTAL EVERY 12 HOURS PRN
Status: DISCONTINUED | OUTPATIENT
Start: 2022-01-01 | End: 2022-01-01 | Stop reason: HOSPADM

## 2022-01-01 RX ORDER — DILTIAZEM HYDROCHLORIDE 120 MG/1
120 CAPSULE, COATED, EXTENDED RELEASE ORAL DAILY
Status: DISCONTINUED | OUTPATIENT
Start: 2022-01-01 | End: 2022-01-01 | Stop reason: HOSPADM

## 2022-01-01 RX ORDER — LISINOPRIL 30 MG/1
30 TABLET ORAL EVERY EVENING
COMMUNITY

## 2022-01-01 RX ORDER — CARVEDILOL 25 MG/1
25 TABLET ORAL 2 TIMES DAILY WITH MEALS
Status: DISCONTINUED | OUTPATIENT
Start: 2022-01-01 | End: 2022-01-01 | Stop reason: HOSPADM

## 2022-01-01 RX ORDER — PROCHLORPERAZINE MALEATE 5 MG
5 TABLET ORAL EVERY 6 HOURS PRN
Status: DISCONTINUED | OUTPATIENT
Start: 2022-01-01 | End: 2022-01-01 | Stop reason: HOSPADM

## 2022-01-01 RX ORDER — LISINOPRIL 20 MG/1
20 TABLET ORAL EVERY EVENING
Status: DISCONTINUED | OUTPATIENT
Start: 2022-01-01 | End: 2022-01-01 | Stop reason: HOSPADM

## 2022-01-01 RX ORDER — FERROUS SULFATE 325(65) MG
325 TABLET ORAL
Status: DISCONTINUED | OUTPATIENT
Start: 2022-01-01 | End: 2022-01-01 | Stop reason: HOSPADM

## 2022-01-01 RX ORDER — ATORVASTATIN CALCIUM 40 MG/1
40 TABLET, FILM COATED ORAL EVERY EVENING
Status: DISCONTINUED | OUTPATIENT
Start: 2022-01-01 | End: 2022-01-01 | Stop reason: HOSPADM

## 2022-01-01 RX ORDER — CHOLECALCIFEROL (VITAMIN D3) 50 MCG
1 TABLET ORAL DAILY
COMMUNITY

## 2022-01-01 RX ORDER — ACETAMINOPHEN 325 MG/1
650 TABLET ORAL EVERY 6 HOURS PRN
Status: DISCONTINUED | OUTPATIENT
Start: 2022-01-01 | End: 2022-01-01 | Stop reason: HOSPADM

## 2022-01-01 RX ORDER — ACETAMINOPHEN 650 MG/1
650 SUPPOSITORY RECTAL EVERY 6 HOURS PRN
Status: DISCONTINUED | OUTPATIENT
Start: 2022-01-01 | End: 2022-01-01 | Stop reason: HOSPADM

## 2022-01-01 RX ORDER — ONDANSETRON 2 MG/ML
4 INJECTION INTRAMUSCULAR; INTRAVENOUS EVERY 6 HOURS PRN
Status: DISCONTINUED | OUTPATIENT
Start: 2022-01-01 | End: 2022-01-01 | Stop reason: HOSPADM

## 2022-01-01 RX ORDER — PANTOPRAZOLE SODIUM 40 MG/1
40 TABLET, DELAYED RELEASE ORAL
Status: DISCONTINUED | OUTPATIENT
Start: 2022-01-01 | End: 2022-01-01 | Stop reason: HOSPADM

## 2022-01-01 RX ORDER — ONDANSETRON 4 MG/1
4 TABLET, ORALLY DISINTEGRATING ORAL EVERY 6 HOURS PRN
Status: DISCONTINUED | OUTPATIENT
Start: 2022-01-01 | End: 2022-01-01 | Stop reason: HOSPADM

## 2022-01-01 RX ORDER — ONDANSETRON 4 MG/1
4 TABLET, ORALLY DISINTEGRATING ORAL EVERY 6 HOURS PRN
COMMUNITY

## 2022-01-01 RX ADMIN — SERTRALINE HYDROCHLORIDE 50 MG: 50 TABLET ORAL at 10:59

## 2022-01-01 RX ADMIN — SODIUM CHLORIDE 1000 ML: 9 INJECTION, SOLUTION INTRAVENOUS at 06:02

## 2022-01-01 RX ADMIN — CARVEDILOL 25 MG: 25 TABLET, FILM COATED ORAL at 10:59

## 2022-01-01 RX ADMIN — DILTIAZEM HYDROCHLORIDE 120 MG: 120 CAPSULE, COATED, EXTENDED RELEASE ORAL at 11:12

## 2022-01-01 RX ADMIN — FERROUS SULFATE TAB 325 MG (65 MG ELEMENTAL FE) 325 MG: 325 (65 FE) TAB at 10:59

## 2022-01-01 RX ADMIN — PANTOPRAZOLE SODIUM 40 MG: 40 TABLET, DELAYED RELEASE ORAL at 10:59

## 2022-01-01 ASSESSMENT — ACTIVITIES OF DAILY LIVING (ADL)
ADLS_ACUITY_SCORE: 35
ADLS_ACUITY_SCORE: 33
ADLS_ACUITY_SCORE: 35
ADLS_ACUITY_SCORE: 35
ADLS_ACUITY_SCORE: 33
ADLS_ACUITY_SCORE: 35
ADLS_ACUITY_SCORE: 33

## 2022-05-19 NOTE — ED TRIAGE NOTES
Patient comes in via EMS from assisted living. Patient called her call light complaining of right arm pain, staff gave her some tylenol and shortly after the patient fell backwards (she was sitting in bed, fell onto back of bed) and her eyes rolled to the back of her head.  Had an unresponsive episode lasting a few minutes.  The patient arrives and seems confused and slow to answer questions.  Negative stroke exam.      Triage Assessment     Row Name 05/19/22 9566       Triage Assessment (Adult)    Airway WDL WDL       Respiratory WDL    Respiratory WDL WDL       Cognitive/Neuro/Behavioral WDL    Cognitive/Neuro/Behavioral WDL --  patient seems to be slow to answer questions.

## 2022-05-19 NOTE — ED NOTES
Bed: ED10  Expected date:   Expected time:   Means of arrival:   Comments:  [facility] - 89F AMS eta 1257

## 2022-05-19 NOTE — ED PROVIDER NOTES
History   Chief Complaint:  Altered Mental Status       HPI   Anastasiya Mota is a 89 year old female with history of multiple stroke and atrial fibrillation who presents with an unresponsive episode.  She had requested pain medications for pain in the right shoulder to back region and when they came to bring Tylenol noted that her eyes rolled back and she fell backwards in the bed.  Fell onto the bed and did not appear to sustain any injuries.  It was a few minutes before she was alert again.  At the time I am in the room her daughter is here now as well.  She states that her mother is currently at her baseline cognitive status.  She used to be on blood thinners but is not on them anymore.  Patient denies chest pain, shortness of breath, abdominal pain.  Denies having any recent fevers, chills or dysuria.    Review of Systems  Ten system ROS reviewed and is negative except as above     Allergies:    No Known Allergies    Medications:    ACETAMINOPHEN PO  ANASTROZOLE PO  aspirin 81 MG EC tablet  atorvastatin (LIPITOR) 40 MG tablet  bisacodyl (DULCOLAX) 10 MG Suppository  carvedilol (COREG) 12.5 MG tablet  cloNIDine (CATAPRES) 0.1 MG tablet  diltiazem (DILACOR XR) 120 MG 24 hr capsule  ferrous sulfate (IRON) 325 (65 FE) MG tablet  guaiFENesin (ROBAFEN) 100 MG/5ML SYRP  hydrochlorothiazide (MICROZIDE) 12.5 MG capsule  LANSOPRAZOLE PO  lisinopril (PRINIVIL/ZESTRIL) 20 MG tablet  melatonin 3 MG tablet  Ondansetron HCl (ZOFRAN PO)  polyethylene glycol (MIRALAX/GLYCOLAX) powder  senna-docusate (SENEXON-S) 8.6-50 MG per tablet  Sertraline HCl (ZOLOFT PO)        Past Medical History:       Past Medical History:   Diagnosis Date     Acid reflux      Atrial fibrillation (H)      Breast cancer (H)      Cancer (H)      Cerebral infarction (H)      Constipation      Elevated cholesterol      Hypertension      Nausea      Paroxysmal supraventricular tachycardia (H)      SVT (supraventricular tachycardia) (H)          Past  Surgical History:      No past surgical history on file.     Family History:      Family History   Problem Relation Age of Onset     No Known Problems Mother      No Known Problems Father      Unknown/Adopted No family hx of          Social History:  Social History     Socioeconomic History     Marital status:      Spouse name: Not on file     Number of children: Not on file     Years of education: Not on file     Highest education level: Not on file   Occupational History     Not on file   Tobacco Use     Smoking status: Never Smoker     Smokeless tobacco: Never Used   Substance and Sexual Activity     Alcohol use: No     Alcohol/week: 0.0 standard drinks     Drug use: No     Sexual activity: Not on file   Other Topics Concern      Service Not Asked     Blood Transfusions Not Asked     Caffeine Concern No     Occupational Exposure Not Asked     Hobby Hazards Not Asked     Sleep Concern Not Asked     Stress Concern Not Asked     Weight Concern Not Asked     Special Diet No     Comment: regular diet      Back Care Not Asked     Exercise No     Bike Helmet Not Asked     Seat Belt Not Asked     Self-Exams Not Asked     Parent/sibling w/ CABG, MI or angioplasty before 65F 55M? Not Asked   Social History Narrative     Not on file     Social Determinants of Health     Financial Resource Strain: Not on file   Food Insecurity: Not on file   Transportation Needs: Not on file   Physical Activity: Not on file   Stress: Not on file   Social Connections: Not on file   Intimate Partner Violence: Not on file   Housing Stability: Not on file       Physical Exam     Patient Vitals for the past 24 hrs:   BP Temp Pulse Resp SpO2   05/19/22 1306 (!) 148/79 -- -- -- --   05/19/22 1258 -- 98.3  F (36.8  C) 54 14 97 %       Physical Exam  Eyes:  Sclera white; Pupils are equal and round  ENT:    External ears and nares normal  CV:  Rate as above with regular rhythm   Resp:  Breath sounds clear and equal  bilaterally    Non-labored, no retractions or accessory muscle use  GI:  Abdomen is soft, non-tender, non-distended    No rebound tenderness or peritoneal features  MS:  Moves all extremities  Skin:  Warm and dry, no rash noted  Neuro:  Speech is brief.  Follows commands.        Emergency Department Course   ECG  ECG results from 05/19/22   EKG 12-lead, tracing only     Value    Systolic Blood Pressure     Diastolic Blood Pressure     Ventricular Rate 56    Atrial Rate 56    UT Interval 180    QRS Duration 144        QTc 465    P Axis 65    R AXIS 78    T Axis 57    Interpretation ECG      Sinus bradycardia with sinus arrhythmia  Right bundle branch block  Abnormal ECG  When compared with ECG of 13-JUN-2018 16:03,  Nonspecific T wave abnormality no longer evident in Anterior leads  Confirmed by GENERATED REPORT, COMPUTER ([id number]),  Aasen, Bradley ([id number]) on 5/20/2022 12:59:17 AM     EKG and past EKG reviewed by myself    Imaging:  CT Head w/o Contrast   Final Result   IMPRESSION: Large areas of chronic encephalomalacia involving the   anterior left temporal lobe and parietal lobes bilaterally consistent   with prior traumatic or ischemic insults. Diffuse cerebral volume loss   and cerebral white matter changes consistent with chronic small vessel   ischemic disease. No evidence for acute intracranial pathology.         Radiation dose for this scan was reduced using automated exposure   control, adjustment of the mA and/or kV according to patient size, or   iterative reconstruction technique.      JANETTE CHAIREZ MD            SYSTEM ID:  [id number]      XR Chest 2 Views   Final Result   IMPRESSION: Enlargement of the superior mediastinal silhouette, could   be accentuated by AP technique, but consider CT if there is concern   for aortic pathology. Lungs are clear. No pleural effusion or   pneumothorax. No acute bony abnormality.      MASOUD MARTIN MD            SYSTEM ID:  [id number]        Report per  radiology    Laboratory:  Labs Ordered and Resulted from Time of ED Arrival to Time of ED Departure   COMPREHENSIVE METABOLIC PANEL - Abnormal       Result Value    Sodium 143      Potassium 4.5      Chloride 111 (*)     Carbon Dioxide (CO2) 28      Anion Gap 4      Urea Nitrogen 25      Creatinine 1.40 (*)     Calcium 8.3 (*)     Glucose 93      Alkaline Phosphatase 97      AST 11      ALT 15      Protein Total 6.4 (*)     Albumin 2.7 (*)     Bilirubin Total 0.4      GFR Estimate 35 (*)    CBC WITH PLATELETS AND DIFFERENTIAL - Abnormal    WBC Count 6.7      RBC Count 4.41      Hemoglobin 12.1      Hematocrit 40.5      MCV 92      MCH 27.4      MCHC 29.9 (*)     RDW 14.2      Platelet Count 250      % Neutrophils 57      % Lymphocytes 25      % Monocytes 11      % Eosinophils 5      % Basophils 1      % Immature Granulocytes 1      NRBCs per 100 WBC 0      Absolute Neutrophils 3.9      Absolute Lymphocytes 1.7      Absolute Monocytes 0.7      Absolute Eosinophils 0.3      Absolute Basophils 0.1      Absolute Immature Granulocytes 0.0      Absolute NRBCs 0.0     TROPONIN I - Normal    Troponin I High Sensitivity 7     MAGNESIUM - Normal    Magnesium 2.0          Interventions:  Medications - No data to display     Disposition:  The patient was discharged to home with her daughter.     Impression & Plan   Medical Decision Making:  Episode is concerning for the possibility of syncope.  This may have been cardiogenic, vasovagal due to pain, or associated with a hypovolemic date.  There is no described seizure activity but intracranial abnormalities could also have contributed.  CT scan was negative.  EKG was without acute dysrhythmia.  Troponin was in the gender normal range.  Without associated chest pain no further troponin or cardiac evaluation will be undertaken.  She did not have any severe electrolyte abnormalities.  She is not anemic.  And she has no inflammatory or infectious markers.  I discussed with her and  her daughter abnormality on the chest x-ray.  Further evaluation of this would require a CT scan of the aorta.  Given her age, tolerance for surgery, and renal function we decided not to pursue additional imaging.  Even if an aortic abnormality was found, they would not be pursuing surgery for it.  They are aware that if there is an aneurysm and it ruptures that it would result in death.  In this scenario it is clinically appropriate to not do further work-up.  She has been bradycardic throughout her emergency department encounter.  They will be following up with her cardiologist regarding potential changes in her carvedilol or diltiazem.    Diagnosis:    ICD-10-CM    1. Unresponsive episode  R41.89    2. Sinus bradycardia  R00.1    3. History of atrial fibrillation  Z86.79    4. Abnormal finding on chest xray  R93.89     Possible enlargement of thoracic aorta, decision with patient and daughter not to pursue further imaging       Discharge Medications:  Discharge Medication List as of 5/19/2022  3:17 PM             Darcie Peña MD  05/20/22 2689

## 2022-10-12 NOTE — ED PROVIDER NOTES
History     Chief Complaint:  Nausea and Generalized Weakness       HPI:  Anastasiya Mota is a 89 year old female who presents with nausea and an episode of unresponsiveness.  History is obtained from patient and supplemented by daughter present at bedside.  Per report, the patient had an episode of nausea and vomiting approximately 30 minutes after consumption of cantaloupe earlier this evening.  She felt as though the cantaloupe did not sit well with her.  She felt improved after nausea and vomiting.  No blood was noted.  Approximately 30 minutes thereafter however, daughter noted that she was in the bathroom, again sitting on the toilet, and daughter heard her audibly retching.  She went to evaluate the patient, and noted that the patient seemed minimally responsive, with her eyes rolled into the back of her head, lasting for about 60 seconds.  No tonic-clonic movement was noted to her arms or legs, and she did not otherwise fall from the toilet or lose postural tone.  EMS was called.  Patient was able to ambulate from her bedroom into the EMS gurney, and gait was reportedly at baseline.  Patient here denies any complaints or concerns.  Daughter feels her to be acting at baseline.  She does have a prior history of multifocal strokes during hospitalization for a GI bleed.  She denies any chest pain, chest pressure, palpitations, abdominal pain, headache, or neurologic deficits.    Allergies:  No Known Allergies     Medications:    ACETAMINOPHEN PO  ANASTROZOLE PO  aspirin 81 MG EC tablet  atorvastatin (LIPITOR) 40 MG tablet  bisacodyl (DULCOLAX) 10 MG Suppository  carvedilol (COREG) 12.5 MG tablet  cloNIDine (CATAPRES) 0.1 MG tablet  diltiazem (DILACOR XR) 120 MG 24 hr capsule  ferrous sulfate (IRON) 325 (65 FE) MG tablet  guaiFENesin (ROBAFEN) 100 MG/5ML SYRP  hydrochlorothiazide (MICROZIDE) 12.5 MG capsule  LANSOPRAZOLE PO  lisinopril (PRINIVIL/ZESTRIL) 20 MG tablet  melatonin 3 MG tablet  Ondansetron HCl  (ZOFRAN PO)  polyethylene glycol (MIRALAX/GLYCOLAX) powder  senna-docusate (SENEXON-S) 8.6-50 MG per tablet  Sertraline HCl (ZOLOFT PO)        Past Medical History:    Past Medical History:   Diagnosis Date     Acid reflux      Atrial fibrillation (H)      Breast cancer (H)      Cancer (H)      Cerebral infarction (H)      Constipation      Elevated cholesterol      Hypertension      Nausea      Paroxysmal supraventricular tachycardia (H)      SVT (supraventricular tachycardia) (H)      Patient Active Problem List    Diagnosis Date Noted     ACP (advance care planning) 06/18/2018     Priority: Medium     Benign essential hypertension 12/08/2017     Priority: Medium     Bright red blood per rectum 04/06/2017     Priority: Medium     Atrial fibrillation (H)      Priority: Medium     SVT (supraventricular tachycardia) (H) 12/26/2016     Priority: Medium        Past Surgical History:    No past surgical history on file.     Family History:    family history includes No Known Problems in her father and mother.    Social History:   reports that she has never smoked. She has never used smokeless tobacco. She reports that she does not drink alcohol and does not use drugs.    Review of Systems:  10 point ROS is negative aside from that mentioned in the HPI      Physical Exam     Patient Vitals for the past 24 hrs:   BP Temp Temp src Pulse Resp SpO2   10/12/22 0236 -- -- -- 59 14 97 %   10/12/22 0231 136/66 -- -- 60 10 97 %   10/12/22 0226 -- -- -- 61 14 98 %   10/12/22 0221 -- -- -- 62 (!) 7 98 %   10/12/22 0216 (!) 152/73 -- -- 60 17 98 %   10/12/22 0029 -- -- -- 59 12 97 %   10/12/22 0014 (!) 146/65 -- -- 59 -- 97 %   10/11/22 2352 (!) 141/75 -- -- 58 13 97 %   10/11/22 2337 (!) 144/67 -- -- 59 11 97 %   10/11/22 2322 139/61 -- -- 57 12 97 %   10/11/22 2307 (!) 147/72 -- -- 58 9 (!) 86 %   10/11/22 2302 -- -- -- 57 10 97 %   10/11/22 2247 138/65 -- -- 56 12 98 %   10/11/22 2237 (!) 130/100 98  F (36.7  C) Oral 56 16 96 %       General:   Well-nourished   Speaking in full sentences   Limited historian  Eyes:   Conjunctiva without injection or scleral icterus  ENT:   Moist mucous membranes   Nares patent   Pinnae normal  Neck:   Full ROM   No stiffness appreciated  Resp:   Lungs CTAB   No crackles, wheezing or audible rubs   Good air movement  CV:    Bradycardic rate, regular rhythm   S1 and S2 present   No murmur, gallop or rub  GI:   BS present   Abdomen soft without distention   Non-tender to light and deep palpation   No guarding or rebound tenderness  Skin:   Warm, dry, well perfused   No rashes or open wounds on exposed skin  MSK:   Moves all extremities   No focal deformities or swelling  Neuro:   Alert   Answers questions appropriately   Moves all extremities equally   Gait stable  Psych:   Normal affect, normal mood        Emergency Department Course     ECG   ECG results from 05/19/22   EKG 12-lead, tracing only     Value    Systolic Blood Pressure     Diastolic Blood Pressure     Ventricular Rate 56    Atrial Rate 56    WY Interval 180    QRS Duration 144        QTc 465    P Axis 65    R AXIS 78    T Axis 57    Interpretation ECG      Sinus bradycardia with sinus arrhythmia  Right bundle branch block  Abnormal ECG  When compared with ECG of 13-JUN-2018 16:03,  Nonspecific T wave abnormality no longer evident in Anterior leads  Confirmed by GENERATED REPORT, COMPUTER ([id number]),  Aasen, Bradley ([id number]) on 5/20/2022 12:59:17 AM         Imaging:  Radiology findings were communicated with the patient who voiced understanding of the findings.  Head CT w/o contrast   Final Result   IMPRESSION:   1.  No CT evidence for acute intracranial process.   2.  Chronic changes as above.           Laboratory:  Laboratory findings were communicated with the patient who voiced understanding of the findings.  Labs Ordered and Resulted from Time of ED Arrival to Time of ED Departure   BASIC METABOLIC PANEL - Abnormal       Result Value     Sodium 143      Potassium 4.2      Chloride 110 (*)     Carbon Dioxide (CO2) 25      Anion Gap 8      Urea Nitrogen 25.6 (*)     Creatinine 1.56 (*)     Calcium 8.6      Glucose 112 (*)     GFR Estimate 31 (*)    TROPONIN T, HIGH SENSITIVITY - Abnormal    Troponin T, High Sensitivity 28 (*)    CBC WITH PLATELETS AND DIFFERENTIAL - Abnormal    WBC Count 7.4      RBC Count 4.17      Hemoglobin 11.3 (*)     Hematocrit 38.1      MCV 91      MCH 27.1      MCHC 29.7 (*)     RDW 14.6      Platelet Count 250      % Neutrophils 66      % Lymphocytes 20      % Monocytes 11      % Eosinophils 3      % Basophils 0      % Immature Granulocytes 0      NRBCs per 100 WBC 0      Absolute Neutrophils 4.8      Absolute Lymphocytes 1.5      Absolute Monocytes 0.8      Absolute Eosinophils 0.2      Absolute Basophils 0.0      Absolute Immature Granulocytes 0.0      Absolute NRBCs 0.0     TROPONIN T, HIGH SENSITIVITY - Abnormal    Troponin T, High Sensitivity 26 (*)    COVID-19 VIRUS (CORONAVIRUS) BY PCR      Emergency Department Course / Reassessment:  Nursing notes and vitals reviewed.  11:11 PM: I performed an exam of the patient as documented above.      The patient was sent for CT while in the emergency department, results above.      ED Course as of 10/12/22 0326   Wed Oct 12, 2022   0106 Patient re-checked.   0210 Patient re-evaluated     I discussed the treatment plan with the patient and daughter.  They are in agreement with hospitalization at this time.  Case was discussed with Dr. Lara, who has graciously accepted the patient for admission to observation.  Questions were answered prior to transfer of care.      Impression & Plan      Medical Decision Making:  Anastasiya Mota is a 89 year old female who presents to the ER for evaluation of nausea, generalized weakness and syncopal episode.  Vital signs on presentation reveal elevated BP, which improved during ED course and are otherwise unremarkable.  Differential diagnosis  includes, though is not limited to, cardiac causes (arrhythmia, acute coronary syndrome, aortic dissection, aortic stenosis, carotid sinus sensitivity) pulmonary embolism, CVA/TIA, seizure, subarachnoid hemorrhage, intracranial hemorrhage, vasovagal syncope, orthostatic hypotension, hypoglycemia, toxicologic etiologies, and infection.  Pt with 2 episodes of nausea/vomiting earlier this evening, the second of which was accompanied by syncope.  Patient with history of recurrent nausea, and has standing order for PRN zofran.  Denies abdominal pain, and exhibits no tenderness on initial and repeat exam, arguing against acute surgical intra-abdominal process for cause of vomiting. Vasovagal etiologies high on differential.  Other etiologies certainly considered.  EKG demonstrates sinus rhythm, RBBB (old), though no acute arrhythmia or acute change compared with previous.  Initial high-sensitivity troponin detectable at 28, though delta 2 hour troponin improved at 26.  Reviewed this in detail with patient and daughter (former OB nurse), and patient without any symptoms of chest pain/pressure/shortness of breath that would further raise concern for ACS.  In the absence of CP, SOB, tachycardia, hypoxia, and given concurrent vomiting, doubt PE.  Head CT negative for ICH, and demonstrates stable findings of prior infarct. No focal neurologic deficits to suggest CVA/TIA, and patient had been ambulatory with baseline gait during transfer to [facility]. Labs otherwise reveal improved anemia (11.3 today), renal insufficiency (within range of prior values).  Cardiac arrhythmia cannot be definitively excluded, though no abnormality yet noted on cardiac monitor.  Seizure on the differential as well, though no seizure activity noted by daughter.  Results and clinical impression reviewed at length with patient and daughter.  Discussed broad differential for cause of syncopal episode and symptoms.  Discussed disposition including DC  back to care facility vs observation and family ultimately preferring observation.  Case discussed with Dr. Lara who has accepted to observation for further monitoring.  Diagnosis:    ICD-10-CM    1. Syncope, unspecified syncope type  R55       2. Nausea and vomiting, unspecified vomiting type  R11.2       3. Renal insufficiency  N28.9          10/11/2022   Yassine Ying MD Roach, Brian Donald, MD  10/12/22 0327

## 2022-10-12 NOTE — PHARMACY-ADMISSION MEDICATION HISTORY
Pharmacy Medication History  Admission medication history interview status for Anastasiya Mota admitted on 10/11/2022 is complete. See [facility] admission navigator for allergy information, prior to admission medications and immunization status.     Medication history resources (including written lists, pill bottles, clinic record): Care Everywhere and chart review    Significant changes made to PTA medication list:  ADDED: vit D   MODIFIED:    - carvedilol from 25 mg BID to 12.5 mg BID   - clonidine from 0.1 mg BID to 0.3 mg BID    - lisinopril from 20 mg every day to 30 mg every day   REMOVED: None   Reported as Not Taking: iron, guaifenasin, hydrochlorothiazide per chart review     Actions taken by pharmacist (provider contacted, etc): None     Additional medication history information: Pt presents from [facility] memory care in [place], attempted to obtain MAR multiple times to no avail. Med list updated per [facility] visits via chart review.     Medication reconciliation/reorder completed by provider prior to medication history? Yes    Prior to Admission medications    Medication Sig Last Dose Taking? Auth Provider Long Term End Date   acetaminophen (TYLENOL) 500 MG tablet Take 1,000 mg by mouth 3 times daily as needed   Yes Reported, Patient     anastrozole (ARIMIDEX) 1 MG tablet Take 1 mg by mouth daily   Yes Reported, Patient Yes    aspirin 81 MG EC tablet Take 1 tablet (81 mg) by mouth daily  Yes Katharina Page PA-C     atorvastatin (LIPITOR) 40 MG tablet Take 40 mg by mouth every evening   Yes Reported, Patient Yes    bisacodyl (DULCOLAX) 10 MG Suppository Place 10 mg rectally daily as needed for constipation  Yes Reported, Patient     carvedilol (COREG) 25 MG tablet Take 12.5 mg by mouth 2 times daily (with meals)  Yes Layla Peterson MD Yes    cloNIDine (CATAPRES) 0.3 MG tablet Take 0.3 mg by mouth 2 times daily as needed  Yes Reported, Patient Yes    diltiazem  (DILACOR XR) 120 MG 24 hr capsule Take 1 capsule (120 mg) by mouth daily  Yes Linda Holbrook PA-C Yes    LANsoprazole (PREVACID) 30 MG DR capsule Take 30 mg by mouth every morning (before breakfast)   Yes Reported, Patient     lisinopril (ZESTRIL) 30 MG tablet Take 30 mg by mouth every evening  Yes Unknown, Entered By History No    melatonin 3 MG tablet Take 3 mg by mouth nightly as needed for sleep  Yes Reported, Patient     ondansetron (ZOFRAN ODT) 4 MG ODT tab Take 4 mg by mouth every 6 hours as needed for nausea or vomiting  Yes Unknown, Entered By History     polyethylene glycol (MIRALAX/GLYCOLAX) powder Take 17 g by mouth daily  Yes Reported, Patient     senna-docusate (SENOKOT-S/PERICOLACE) 8.6-50 MG tablet Take 2 tablets by mouth 2 times daily  Yes Reported, Patient     sertraline (ZOLOFT) 50 MG tablet Take 50 mg by mouth daily  Yes Unknown, Entered By History Yes    vitamin D3 (CHOLECALCIFEROL) 50 mcg (2000 units) tablet Take 1 tablet by mouth daily  Yes Unknown, Entered By History     ferrous sulfate (IRON) 325 (65 FE) MG tablet Take 325 mg by mouth daily (with breakfast)    Reported, Patient     guaiFENesin (ROBAFEN) 100 MG/5ML SYRP Take 10 mLs by mouth every 6 hours as needed for cough   Reported, Patient No    hydrochlorothiazide (MICROZIDE) 12.5 MG capsule Take 1 capsule (12.5 mg) by mouth daily   Vindo Barajas MD Yes        Sarah Ramos [facility]

## 2022-10-12 NOTE — PROGRESS NOTES
ROOM # 206-1    Living Situation (if not independent, order SW consult):  in [place]   Facility name: [facility]  : daughter    Activity level at baseline: assist of 1 And walker  Activity level on admit: assist of 1 and walker    Who will be transporting you at discharge: DRU    Patient registered to observation; given Patient Bill of Rights; given the opportunity to ask questions about observation status and their plan of care.  Patient has been oriented to the observation room, bathroom and call light is in place.    Discussed discharge goals and expectations with patient/family.

## 2022-10-12 NOTE — H&P
[facility]  Hospitalist Admission Note  Name: Anastasiya Mota    MRN: [id number]  YOB: 1929    Age: 89 year old  Date of admission: 10/11/2022  Primary care provider: Yelena Aldana    Chief Complaint: Vomiting, loss of consciousness    Assessment and Plan:   Vomiting: Patient had episode of vomiting after eating cantaloupe this evening.  Second episode where she was in the bathroom retching where she had a syncopal episode.  Denies any abdominal pain.  Otherwise has been in normal state of health without other episodes of vomiting, abdominal pain, diarrhea, fevers.  Feels better after Zofran.  -Zofran and Compazine as needed  -Regular diet as tolerated  -Check UA/UC, last treated for UTI 6 weeks ago, no urinary symptoms currently but not a reliable historian    Suspect vasovagal syncope  RBBB  Elevated troponin: After second episode of retching she slumped forward on the toilet and eyes rolled back in her head with brief LOC for roughly 60 seconds.  Now back to baseline mental status.  No cardiac or syncope history.  TTE from 2019 showed preserved EF, mild LVH, moderate pulmonary HTN, very small pericardial effusion.  Blood pressure here 141/75 and heart rate 59.  EKG shows known RBBB and mild sinus bradycardia.  Troponin slightly elevated 25 with repeat 26.  Noncontrast head CT negative for any acute pathology.  Highly suspect vasovagal syncope in the setting of vomiting.  -Give 1 L NS bolus over 2 hours  -Zofran and Compazine for nausea  -Check orthostatic blood pressure  -Cardiac monitor overnight  -Obtain TTE  -Repeat troponin with morning lab draw  -Presuming syncope work-up negative, no further work-up should be needed presuming no further syncopal events  -Consult PT if any concerns walking in the perez, uses walker    Vascular dementia  MDD  Anxiety  History CVAs: Lives in memory care.  She was able to answer some symptom-based questions.  At baseline walks with a  walker.  -Resume PTA meds once med rec completed, may be on sertraline, 81 mg aspirin, and atorvastatin    HTN: Previous prescriptions for carvedilol 12.5 mg twice daily, clonidine 0.3 mg twice daily, lisinopril 30 mg daily, and diltiazem  mg daily, but some of these are a few years old.  Did not come with a MAR from her care facility.  Blood pressure 141/75 in the ER.  -Need to obtain MAR from facility complete med rec, renal function seems at baseline so presumably should build to resume her home regimen and monitor blood pressure presuming orthostatic blood pressure testing is normal    CKD stage IIIb: ~Creatinine at baseline of 1.4.  -BMP in the morning    Chronic anemia: Hemoglobin at baseline of 10-11.    History of breast cancer: Can resume anastrozole if still taking at care facility after discharge.    History uterine prolapse    DVT Prophylaxis: Low Risk/Ambulatory with no VTE prophylaxis indicated  Code Status: DNR / DNI -confirmed with patient's daughter  FEN: Regular diet as tolerated, 1 L NS bolus as above  Discharge Dispo: [facility]  Estimated Disch Date / # of Days until Disch: Admit to observation for syncope evaluation.  Presuming negative work-up and tolerating p.o. likely able to discharge by the afternoon.      History of Present Illness:  Anastasiya Mota is a 89 year old female with PMH including vascular dementia living in [facility], CVA, HTN, MDD, anxiety, breast cancer, CKD stage IIIb, RBBB, anemia, uterine prolapse, and UTI who presents with her daughter from her memory care following a syncopal episode.  Her daughter happened to be visiting her at her [facility] care when after a little while after eating some gallop she went to the bathroom and vomited.  She then returned and was feeling a little better.  She went to the bathroom again and her daughter clear her retching.  Her daughter went into the bathroom and the patient slumped forward on the  toilet.  Her eyes rolled in the back of her head.  She had loss of consciousness for roughly 60 seconds.  After this she slowly came to and currently is at baseline mental status.  Denies any previous syncopal episodes.  The patient received Zofran and no longer feels nauseous.  Has not had any further vomiting.  Denies any abdominal pain, fevers, chills, diarrhea.  Treated for UTI about 6 weeks ago when she had confusion.  She denies any urinary frequency or pain.  Currently denies any headache, dizziness, weakness, numbness.    History obtained from patient, patient's daughter, medical record, and from Dr. Ying in the emergency department.  Blood pressure 141/75, heart rate 59, temperature 98.0 these Fahrenheit, oxygen 97% on room air.  Labs showed WBC 7.4, hemoglobin 11.3, platelet count 250.  Creatinine is 1.56 similar to baseline and electrolytes are within normal limits.  Blood glucose 112.  High-sensitivity troponin T is elevated at 28 with repeat coming down to 26.  EKG shows sinus bradycardia with RBBB that is not new.  Noncontrast head CT negative for acute pathology.  Dr. Ying has requested an observation bed for syncope evaluation after discussion with the patient's daughter who would like this evaluated.  Admit to observation.       Clinically Significant Risk Factors Present on Admission                 # Hypertension: home medication list includes antihypertensive(s)                    Past Medical History reviewed:  Past Medical History:   Diagnosis Date     Acid reflux      Atrial fibrillation (H)      Breast cancer (H)      Cancer (H)      Cerebral infarction (H)      Constipation      Elevated cholesterol      Hypertension      Nausea      Paroxysmal supraventricular tachycardia (H)      SVT (supraventricular tachycardia) (H)    Vascular dementia  Anemia    Past Surgical History reviewed:  No past surgical history on file.  Social History reviewed:  Social History     Tobacco Use     Smoking  status: Never     Smokeless tobacco: Never   Substance Use Topics     Alcohol use: No     Alcohol/week: 0.0 standard drinks     Social History     Social History Narrative     Not on file     Family History reviewed:  Family History   Problem Relation Age of Onset     No Known Problems Mother      No Known Problems Father      Unknown/Adopted No family hx of      Allergies:  No Known Allergies  Medications Per previous prescriptions, med rec not complete:  Prior to Admission medications    Medication Sig Last Dose Taking? Auth Provider Long Term End Date   ACETAMINOPHEN PO Take 1,000 mg by mouth 3 times daily as needed    Reported, Patient     ANASTROZOLE PO Take 1 mg by mouth daily    Reported, Patient Yes    aspirin 81 MG EC tablet Take 1 tablet (81 mg) by mouth daily   Katharina Page PA-C     atorvastatin (LIPITOR) 40 MG tablet Take 40 mg by mouth every evening    Reported, Patient Yes    bisacodyl (DULCOLAX) 10 MG Suppository Place 10 mg rectally daily as needed for constipation   Reported, Patient     carvedilol (COREG) 12.5 MG tablet Take 25 mg by mouth 2 times daily (with meals)    Layla Peterson MD Yes    cloNIDine (CATAPRES) 0.1 MG tablet Take 0.1 mg by mouth 2 times daily as needed   Reported, Patient Yes    diltiazem (DILACOR XR) 120 MG 24 hr capsule Take 1 capsule (120 mg) by mouth daily   Linda Holbrook PA-C Yes    ferrous sulfate (IRON) 325 (65 FE) MG tablet Take 325 mg by mouth daily (with breakfast)    Reported, Patient     guaiFENesin (ROBAFEN) 100 MG/5ML SYRP Take 10 mLs by mouth every 6 hours as needed for cough   Reported, Patient No    hydrochlorothiazide (MICROZIDE) 12.5 MG capsule Take 1 capsule (12.5 mg) by mouth daily   Vinod Barajas MD Yes    LANSOPRAZOLE PO Take 30 mg by mouth every morning (before breakfast)    Reported, Patient     lisinopril (PRINIVIL/ZESTRIL) 20 MG tablet Take 1 tablet (20 mg) by mouth every evening   Linda Holbrook PA-C Yes    melatonin  3 MG tablet Take 3 mg by mouth nightly as needed for sleep   Reported, Patient     Ondansetron HCl (ZOFRAN PO) Take 4 mg by mouth every 8 hours as needed for nausea or vomiting   Unknown, Entered By History     polyethylene glycol (MIRALAX/GLYCOLAX) powder Take 17 g by mouth daily   Reported, Patient     senna-docusate (SENEXON-S) 8.6-50 MG per tablet Take 2 tablets by mouth 2 times daily   Reported, Patient     Sertraline HCl (ZOLOFT PO) Take 50 mg by mouth daily   Unknown, Entered By History Yes      Review of Systems:  A Comprehensive greater than 10 system review of systems was carried out.  Pertinent positives and negatives are noted above.  Otherwise negative.     Physical Exam:  Blood pressure 136/66, pulse 59, temperature 98  F (36.7  C), temperature source Oral, resp. rate 14, SpO2 97 %, not currently breastfeeding.  Wt Readings from Last 1 Encounters:   08/08/19 95.1 kg (209 lb 11.2 oz)     Exam:  Constitutional: Awake, NAD   Eyes: sclera white   HEENT: atraumatic, MMM  Respiratory: no respiratory distress, lungs cta bilaterally, no crackles or wheeze  Cardiovascular: Slight regular bradycardia, no murmur appreciated  GI: non-tender, not distended, bowel sounds present  Skin: no rash or lesions, acyanotic  Musculoskeletal/extremities: atraumatic, no major deformities. No significant lower extremity edema  Neurologic: Alert, and oriented to place or time, answering some symptom-based questions but trails off, strength equal in all extremities light touch sensation intact  Psychiatric: calm, cooperative, normal affect    Lab and imaging data personally reviewed:  Labs:  Recent Labs   Lab 10/11/22  2330   WBC 7.4   HGB 11.3*   HCT 38.1   MCV 91        Recent Labs   Lab 10/11/22  2330      POTASSIUM 4.2   CHLORIDE 110*   CO2 25   ANIONGAP 8   *   BUN 25.6*   CR 1.56*   GFRESTIMATED 31*   RONALD 8.6     High-sensitivity troponin T 28 with repeat down to 26.    EKG: Sinus bradycardia,  RBBB    Imaging:  Recent Results (from the past 24 hour(s))   Head CT w/o contrast    Narrative    EXAM: CT HEAD W/O CONTRAST  LOCATION: [facility]  DATE/TIME: 10/12/2022 12:05 AM    INDICATION: episode of unresponsiveness  COMPARISON: 05/19/2022 CT head  TECHNIQUE: Routine CT Head without IV contrast. Multiplanar reformats. Dose reduction techniques were used.    FINDINGS:  INTRACRANIAL CONTENTS: No intracranial hemorrhage, extraaxial collection, or mass effect.  No CT evidence of acute infarct. 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 Findings are similar to the prior exam. No evidence of acute gray-white differentiation loss. Moderate presumed chronic small vessel ischemic changes. Moderate generalized volume loss. No hydrocephalus.     VISUALIZED ORBITS/SINUSES/MASTOIDS: Prior right cataract surgery. Visualized portions of the orbits are otherwise unremarkable. Mild mucosal thickening scattered about the paranasal sinuses. No middle ear or mastoid effusion.    BONES/SOFT TISSUES: No acute abnormality.      Impression    IMPRESSION:  1.  No CT evidence for acute intracranial process.  2.  Chronic changes as above.       Raad Lara MD  Hospitalist  [facility]

## 2022-10-12 NOTE — ED NOTES
[facility]  ED Nurse Handoff Report    Anastasiya Mota is a 89 year old female   ED Chief complaint: Nausea and Generalized Weakness  . ED Diagnosis:   Final diagnoses:   None     Allergies: No Known Allergies    Code Status: Full Code  Activity level - Baseline/Home:  Assist X 1. Activity Level - Current:   Assist X 2. Lift room needed: No. Bariatric: No   Needed: No   Isolation: No. Infection: Not Applicable.     Vital Signs:   Vitals:    10/11/22 2337 10/11/22 2352 10/12/22 0014 10/12/22 0029   BP: (!) 144/67 (!) 141/75 (!) 146/65    Pulse: 59 58 59 59   Resp: 11 13 12   Temp:       TempSrc:       SpO2: 97% 97% 97% 97%       Cardiac Rhythm:  ,      Pain level:    Patient confused: Yes - lives in memory care. Able to follow directions appropriately. Very pleasant. Patient Falls Risk: Yes.   Elimination Status: Has voided   Patient Report - Initial Complaint: Nausea, weakness.   Focused Assessment:  Anastasiya Mota is a 89 year old female who presents with nausea and an episode of unresponsiveness.  History is obtained from patient and supplemented by daughter present at bedside.  Per report, the patient had an episode of nausea and vomiting approximately 30 minutes after consumption of cantaloupe earlier this evening.  She felt as though the cantaloupe did not sit well with her.  She felt improved after nausea and vomiting.  No blood was noted.  Approximately 30 minutes thereafter however, daughter noted that she was in the bathroom, again sitting on the toilet, and daughter heard her audibly retching.  She went to evaluate the patient, and noted that the patient seemed minimally responsive, with her eyes rolled into the back of her head, lasting for about 60 seconds.  No tonic-clonic movement was noted to her arms or legs, and she did not otherwise fall from the toilet or lose postural tone.  EMS was called.  Patient was able to ambulate from her bedroom into the EMS gurney, and gait was  reportedly at baseline.  Patient here denies any complaints or concerns.  Daughter feels her to be acting at baseline.  She does have a prior history of multifocal strokes during hospitalization for a GI bleed.  She denies any chest pain, chest pressure, palpitations, abdominal pain, headache, or neurologic deficits.  Tests Performed: Labs, imaging.   Abnormal Results:   Labs Ordered and Resulted from Time of ED Arrival to Time of ED Departure   BASIC METABOLIC PANEL - Abnormal       Result Value    Sodium 143      Potassium 4.2      Chloride 110 (*)     Carbon Dioxide (CO2) 25      Anion Gap 8      Urea Nitrogen 25.6 (*)     Creatinine 1.56 (*)     Calcium 8.6      Glucose 112 (*)     GFR Estimate 31 (*)    TROPONIN T, HIGH SENSITIVITY - Abnormal    Troponin T, High Sensitivity 28 (*)    CBC WITH PLATELETS AND DIFFERENTIAL - Abnormal    WBC Count 7.4      RBC Count 4.17      Hemoglobin 11.3 (*)     Hematocrit 38.1      MCV 91      MCH 27.1      MCHC 29.7 (*)     RDW 14.6      Platelet Count 250      % Neutrophils 66      % Lymphocytes 20      % Monocytes 11      % Eosinophils 3      % Basophils 0      % Immature Granulocytes 0      NRBCs per 100 WBC 0      Absolute Neutrophils 4.8      Absolute Lymphocytes 1.5      Absolute Monocytes 0.8      Absolute Eosinophils 0.2      Absolute Basophils 0.0      Absolute Immature Granulocytes 0.0      Absolute NRBCs 0.0     TROPONIN T, HIGH SENSITIVITY - Abnormal    Troponin T, High Sensitivity 26 (*)    ROUTINE UA WITH MICROSCOPIC REFLEX TO CULTURE   COVID-19 VIRUS (CORONAVIRUS) BY PCR     Head CT w/o contrast   Final Result   IMPRESSION:   1.  No CT evidence for acute intracranial process.   2.  Chronic changes as above.         Treatments provided:   Family Comments: Daughter at bedside  OBS brochure/video discussed/provided to patient:  Yes  ED Medications: Medications - No data to display  Drips infusing:  No  For the majority of the shift, the patient's behavior Green.  Interventions performed were Frequent rounding.    Sepsis treatment initiated: No     Patient tested for COVID 19 prior to admission: YES    ED Nurse Name/Phone Number: Kizzy Tinajero RN,   2:30 AM  RECEIVING UNIT ED HANDOFF REVIEW    Above ED Nurse Handoff Report was reviewed: Yes  Reviewed by: Artemio Cole RN on October 12, 2022 at 4:58 AM

## 2022-10-12 NOTE — PLAN OF CARE
PRIMARY DIAGNOSIS: SYNCOPE/TIA  OUTPATIENT/OBSERVATION GOALS TO BE MET BEFORE DISCHARGE:  1. Orthostatic performed: Yes:          Lying Orthostatic BP: 169/142         Sitting Orthostatic BP: 135/87         Standing Orthostatic BP: 189/95     2. Diagnostic testing complete & at baseline neurologic testing: No    3. Cleared by consultants (if involved): No    4. Interpretation of cardiac rhythm per telemetry tech: Sinus James 59    5. Tolerating adequate PO diet and medications: meds not verified, has not eaten breakfast yet.     6. Return to near baseline physical activity or neurologic status: Yes    Discharge Planner Nurse   Safe discharge environment identified: Yes  Barriers to discharge: No       Entered by: Sandro Meraz RN 10/12/2022       A/O x1, only to self. VSS on RA. Assist of 1. Lung sounds clear. Bowel sounds active. DRU BM. Purewick in place. Skin intact. Denies pain. Denies nausea. Tele Sinus James 59. Morning meds given.     Please review provider order for any additional goals.   Nurse to notify provider when observation goals have been met and patient is ready for discharge.

## 2022-10-12 NOTE — CONSULTS
Care Management Discharge Note    Discharge Date: 10/12/2022     Discharge Disposition:  [facility]    Discharge Transportation:  Daughter will transport at 11am    Private pay costs discussed: Not applicable     Education Provided on the Discharge Plan: Yes    Persons Notified of Discharge Plans: Patient's daughter LORETA Naylor [phone]  Patient/Family in Agreement with the Plan:  Yes    Handoff Referral Completed: No    Additional Information:  Patient admitted under observation status for syncope. Patient resides at [facility]. AMRITA spoke with LORETA Rissa, [phone]. She confirms pt resides in , receiving assist with all ADLs. They can accept patient back before 1330. Orders to be faxed to (f) [phone]. Any new medications [facility].     AMRITA spoke with daughter Cyndy, [phone]. She will plan to come to the hospital at 11am and can transport pt back to facility.     1033: Discharge orders faxed to . Left VM for LORETA to notify of pt's return.     MATTEO Martins, [facility]   Inpatient Care Coordination  [facility]   [phone]

## 2022-10-12 NOTE — DISCHARGE SUMMARY
[facility]  Hospitalist Discharge Summary      Date of Admission:  10/11/2022  Date of Discharge:  10/12/2022  Discharging Provider: Katharina Page PA-C  Discharge Service: Hospitalist Service    Discharge Diagnoses   Vomiting  Syncope     Follow-ups Needed After Discharge   Follow-up Appointments     Follow-up and recommended labs and tests       Follow up with primary care provider, Yelena Aldana, within 7 days for   hospital follow- up.  No follow up labs or test are needed.               Unresulted Labs Ordered in the Past 30 Days of this Admission     No orders found for last 31 day(s).      These results will be followed up by PCP    Discharge Disposition   Discharged to home  Condition at discharge: Stable      Hospital Course   Anastasiya Mota is a 89 year old female admitted on 10/11/2022 with syncope after 2 episodes of vomiting.  She vomited shortly after eating cantaloupe and on her second emesis, she had a syncopal episode.  Her work-up in the emergency department is fairly unremarkable.  Her creatinine was slightly elevated at 1.56 and troponin elevated at 28.  CT of the head is unremarkable.  EKG showed sinus bradycardia with RBBB.  COVID-19 negative.  Patient was admitted to observation for further management.  She was given IV fluids and her creatinine improved to 1.41.  Echocardiogram was obtained which is fairly unremarkable, showing grade 1 diastolic dysfunction.  She did not have any further episodes of vomiting, nausea or syncope.  She will discharge back to her memory care facility.      Consultations This Hospital Stay   CARE MANAGEMENT / SOCIAL WORK IP CONSULT    Code Status   No CPR- Do NOT Intubate    Time Spent on this Encounter   I, Katharina Page PA-C, personally saw the patient today and spent less than or equal to 30 minutes discharging this patient.       [facility] OBSERVATION DEPT  [address]  [place] [address]  Phone: [phone]  ______________________________________________________________________    Physical Exam   Vital Signs: Temp: 98.3  F (36.8  C) Temp src: Oral BP: (!) 146/91 Pulse: 57   Resp: 18 SpO2: 94 % O2 Device: None (Room air)    Weight: 191 lbs 8 oz    GENERAL:  Comfortable.  PSYCH: pleasant, No acute distress.  HEART:  RRR  LUNGS:  Normal Respiratory effort.   EXTREMITIES:  Moves all extremities independently   SKIN:  Dry to touch, No rash, wound or ulcerations.  NEUROLOGIC:  Grossly intact.          Primary Care Physician   Yelena Aldana    Discharge Orders      Reason for your hospital stay    2 episodes of vomiting and syncopal episode, likely vasovagal from vomiting. Echocardiogram was obtained for completeness sake and this was unremarkable.     Follow-up and recommended labs and tests     Follow up with primary care provider, Yelena Aldana, within 7 days for hospital follow- up.  No follow up labs or test are needed.     Activity    Your activity upon discharge: activity as tolerated     When to contact your care team    Call your primary doctor if you have any of the following: temperature greater than 101, shortness of breath, chest pain, or recurrent syncope.     Diet    Follow this diet upon discharge: Regular       Significant Results and Procedures   Most Recent 3 CBC's:Recent Labs   Lab Test 10/12/22  0743 10/11/22  2330 08/08/22  1127   WBC 7.8 7.4 5.8   HGB 11.0* 11.3* 10.6*   MCV 93 91 92    250 234     Most Recent 3 BMP's:Recent Labs   Lab Test 10/12/22  0743 10/11/22  2330 08/08/22  1127    143 141   POTASSIUM 4.8 4.2 4.2   CHLORIDE 109* 110* 107   CO2 27 25 25   BUN 22.1 25.6* 25.8*   CR 1.41* 1.56* 1.33*   ANIONGAP 7 8 9   RONALD 8.4 8.6 8.6   * 112* 83     Most Recent 2 LFT's:Recent Labs   Lab Test 08/08/22  1127 05/19/22  1322   AST 15 11   ALT 6* 15   ALKPHOS 73 97   BILITOTAL 0.3 0.4     Most Recent 3 Troponin's:Recent Labs   Lab  Test 06/13/18  1642 12/26/16  1025 12/26/16  0748 12/26/16  0246   TROPI <0.015 <0.015  The 99th percentile for upper reference range is 0.045 ug/L.  Troponin values in   the range of 0.045 - 0.120 ug/L may be associated with risks of adverse   clinical events.   <0.015  The 99th percentile for upper reference range is 0.045 ug/L.  Troponin values in   the range of 0.045 - 0.120 ug/L may be associated with risks of adverse   clinical events.    --    TROPONIN  --   --   --  0.01   ,   Results for orders placed or performed during the hospital encounter of 10/11/22   Head CT w/o contrast    Narrative    EXAM: CT HEAD W/O CONTRAST  LOCATION: [facility]  DATE/TIME: 10/12/2022 12:05 AM    INDICATION: episode of unresponsiveness  COMPARISON: 05/19/2022 CT head  TECHNIQUE: Routine CT Head without IV contrast. Multiplanar reformats. Dose reduction techniques were used.    FINDINGS:  INTRACRANIAL CONTENTS: No intracranial hemorrhage, extraaxial collection, or mass effect.  No CT evidence of acute infarct. 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 Findings are similar to the prior exam. No evidence of acute gray-white differentiation loss. Moderate presumed chronic small vessel ischemic changes. Moderate generalized volume loss. No hydrocephalus.     VISUALIZED ORBITS/SINUSES/MASTOIDS: Prior right cataract surgery. Visualized portions of the orbits are otherwise unremarkable. Mild mucosal thickening scattered about the paranasal sinuses. No middle ear or mastoid effusion.    BONES/SOFT TISSUES: No acute abnormality.      Impression    IMPRESSION:  1.  No CT evidence for acute intracranial process.  2.  Chronic changes as above.   Echocardiogram Complete     Value    LVEF  65-70%    Narrative    [id number]  [id number]  [id number]  [id number]^TREVOR^ANTHONY^[facility]  Echocardiography  Laboratory  [address] [place] [address]     Name: MG LOCKETT  MRN: [id number]  : 1929  Study Date: 10/12/2022 08:22 AM  Age: 89 yrs  Gender: Female  Patient Location: [facility]  Reason For Study: Syncope  Ordering Physician: ANTHONY MURRAY  Performed By: Nhi Jones     BSA: 1.8 m2  Height: 63 in  Weight: 175 lb  BP: 130/55 mmHg  ______________________________________________________________________________  Procedure  Complete Portable Echo Adult.  ______________________________________________________________________________  Interpretation Summary     Left ventricular systolic function is normal.  The visual ejection fraction is 65-70%.  No regional wall motion abnormalities noted.  The study was technically difficult. Compared to the prior study dated ,  there have been no changes.  ______________________________________________________________________________  Left Ventricle  The left ventricle is normal in size. There is mild to moderate concentric  left ventricular hypertrophy. Left ventricular systolic function is normal.  The visual ejection fraction is 65-70%. Grade I or early diastolic  dysfunction. No regional wall motion abnormalities noted.     Right Ventricle  The right ventricle is normal size. The right ventricular systolic function is  normal.     Atria  The left atrium is mildly dilated. Right atrial size is normal.     Mitral Valve  The mitral valve leaflets are moderately thickened. There is trace mitral  regurgitation.     Tricuspid Valve  There is mild (1+) tricuspid regurgitation. IVC diameter <2.1 cm collapsing  >50% with sniff suggests a normal RA pressure of 3 mmHg. The right ventricular  systolic pressure is approximated at 56mmHg plus the right atrial pressure.  Right ventricular systolic pressure is elevated, consistent with moderate  pulmonary hypertension.     Aortic Valve  There is moderate trileaflet aortic sclerosis. No aortic  stenosis is present.     Pulmonic Valve  The pulmonic valve is not well seen, but is grossly normal.     Vessels  The aortic root is normal size.     Pericardium  There is no pericardial effusion.     Rhythm  Sinus rhythm was noted.  ______________________________________________________________________________  MMode/2D Measurements & Calculations  IVSd: 1.3 cm  LVIDd: 4.7 cm  LVIDs: 2.8 cm  LVPWd: 1.4 cm  FS: 40.0 %  LV mass(C)d: 248.7 grams  LV mass(C)dI: 136.1 grams/m2  Ao root diam: 3.1 cm  asc Aorta Diam: 3.6 cm  LVOT diam: 2.0 cm  LVOT area: 3.1 cm2  LA Volume (BP): 68.5 ml  LA Volume Index (BP): 37.4 ml/m2     RWT: 0.59     Doppler Measurements & Calculations  MV E max regan: 74.9 cm/sec  MV A max regan: 107.0 cm/sec  MV E/A: 0.70  MV dec time: 0.29 sec  Ao V2 max: 179.0 cm/sec  Ao max P.0 mmHg  Ao V2 mean: 123.0 cm/sec  Ao mean P.0 mmHg  Ao V2 VTI: 45.3 cm  GUERDA(I,D): 1.9 cm2  GUERDA(V,D): 1.9 cm2  LV V1 max P.5 mmHg  LV V1 max: 106.0 cm/sec  LV V1 VTI: 27.4 cm  LV dP/dt: 2272 mmHg/s  SV(LVOT): 86.1 ml  SI(LVOT): 47.1 ml/m2  PA acc time: 0.09 sec  TR max regan: 358.8 cm/sec  TR max P.0 mmHg  AV Regan Ratio (DI): 0.59  GUERDA Index (cm2/m2): 1.0  E/E' avg: 10.1  Lateral E/e': 9.6  Medial E/e': 10.6     ______________________________________________________________________________  Report approved by: Feliberto Mars 10/12/2022 09:46 AM               Discharge Medications   Current Discharge Medication List      CONTINUE these medications which have NOT CHANGED    Details   ACETAMINOPHEN PO Take 1,000 mg by mouth 3 times daily as needed       ANASTROZOLE PO Take 1 mg by mouth daily       aspirin 81 MG EC tablet Take 1 tablet (81 mg) by mouth daily  Qty: 90 tablet, Refills: 3    Associated Diagnoses: CVA (cerebral vascular accident) (H)      atorvastatin (LIPITOR) 40 MG tablet Take 40 mg by mouth every evening       bisacodyl (DULCOLAX) 10 MG Suppository Place 10 mg rectally daily as needed for  constipation      carvedilol (COREG) 12.5 MG tablet Take 25 mg by mouth 2 times daily (with meals)   Qty: 60 tablet, Refills: 3    Associated Diagnoses: Atrial flutter, unspecified type (H); SVT (supraventricular tachycardia) (H)      cloNIDine (CATAPRES) 0.1 MG tablet Take 0.1 mg by mouth 2 times daily as needed      diltiazem (DILACOR XR) 120 MG 24 hr capsule Take 1 capsule (120 mg) by mouth daily  Qty: 30 capsule, Refills: 11    Associated Diagnoses: Benign essential hypertension      ferrous sulfate (IRON) 325 (65 FE) MG tablet Take 325 mg by mouth daily (with breakfast)       guaiFENesin (ROBAFEN) 100 MG/5ML SYRP Take 10 mLs by mouth every 6 hours as needed for cough      hydrochlorothiazide (MICROZIDE) 12.5 MG capsule Take 1 capsule (12.5 mg) by mouth daily  Qty: 90 capsule, Refills: 3    Associated Diagnoses: Lower extremity edema; Benign essential hypertension      LANSOPRAZOLE PO Take 30 mg by mouth every morning (before breakfast)       lisinopril (PRINIVIL/ZESTRIL) 20 MG tablet Take 1 tablet (20 mg) by mouth every evening  Qty: 30 tablet, Refills: 3    Associated Diagnoses: Benign essential hypertension      melatonin 3 MG tablet Take 3 mg by mouth nightly as needed for sleep      Ondansetron HCl (ZOFRAN PO) Take 4 mg by mouth every 8 hours as needed for nausea or vomiting      polyethylene glycol (MIRALAX/GLYCOLAX) powder Take 17 g by mouth daily      senna-docusate (SENEXON-S) 8.6-50 MG per tablet Take 2 tablets by mouth 2 times daily      Sertraline HCl (ZOLOFT PO) Take 50 mg by mouth daily           Allergies   No Known Allergies

## 2022-10-12 NOTE — ED TRIAGE NOTES
Pt arrives via EMS. Per EMS, pt lives in a memory care unit in [place]. EMS reports that pt's daughter happened to be with pt and heard her wretching while sitting on the toilet. Pt reportedly had a BM but was experiencing generalized weakness with sustained nausea. Pt has chronic nausea that is treated with PRN zofran but pt did not receive any from her facility today. EMS did give pt 4 mg ODT zofran, pt declined an IV from them. Pt answers questions appropriately but pt's daughter advised EMS that pt has trouble with orientation to place and time. Pt's daughter is on her way. 12 lead was unremarkable, she was bradycardic in the 50-60s but this is baseline as well as a right bundle branch block. Pt denies any pain. Pt alert, oriented to self. ABCS intact    Of note, pt was able to ambulate with her walker on scene for EMS

## 2022-10-12 NOTE — PLAN OF CARE
Patient's After Visit Summary was reviewed with patient and daughter.   Patient verbalized understanding of After Visit Summary, recommended follow up and was given an opportunity to ask questions.   Discharge medications sent home with patient/family: No   Discharged with daughter Cyndy.    VSS on discharge. Belongings with daughter. All concerns addressed.     OBSERVATION patient END time: 1230

## (undated) RX ORDER — DOBUTAMINE HYDROCHLORIDE 200 MG/100ML
INJECTION INTRAVENOUS
Status: DISPENSED
Start: 2017-03-20

## (undated) RX ORDER — ONDANSETRON 2 MG/ML
INJECTION INTRAMUSCULAR; INTRAVENOUS
Status: DISPENSED
Start: 2017-03-20

## (undated) RX ORDER — FENTANYL CITRATE 50 UG/ML
INJECTION, SOLUTION INTRAMUSCULAR; INTRAVENOUS
Status: DISPENSED
Start: 2017-03-20

## (undated) RX ORDER — HYDRALAZINE HYDROCHLORIDE 20 MG/ML
INJECTION INTRAMUSCULAR; INTRAVENOUS
Status: DISPENSED
Start: 2017-03-20

## (undated) RX ORDER — HEPARIN SODIUM 1000 [USP'U]/ML
INJECTION, SOLUTION INTRAVENOUS; SUBCUTANEOUS
Status: DISPENSED
Start: 2017-03-20